# Patient Record
Sex: FEMALE | Race: WHITE | Employment: UNEMPLOYED | URBAN - METROPOLITAN AREA
[De-identification: names, ages, dates, MRNs, and addresses within clinical notes are randomized per-mention and may not be internally consistent; named-entity substitution may affect disease eponyms.]

---

## 2017-02-15 ENCOUNTER — OFFICE VISIT (OUTPATIENT)
Dept: FAMILY MEDICINE CLINIC | Age: 32
End: 2017-02-15

## 2017-02-15 VITALS
OXYGEN SATURATION: 97 % | BODY MASS INDEX: 26.13 KG/M2 | WEIGHT: 142 LBS | SYSTOLIC BLOOD PRESSURE: 120 MMHG | HEIGHT: 62 IN | HEART RATE: 72 BPM | RESPIRATION RATE: 12 BRPM | DIASTOLIC BLOOD PRESSURE: 76 MMHG

## 2017-02-15 DIAGNOSIS — R21 RASH: ICD-10-CM

## 2017-02-15 DIAGNOSIS — E03.1 CONGENITAL HYPOTHYROIDISM WITHOUT GOITER: ICD-10-CM

## 2017-02-15 DIAGNOSIS — F72 MR (MENTAL RETARDATION), SEVERE: Primary | ICD-10-CM

## 2017-02-15 DIAGNOSIS — Z79.899 ENCOUNTER FOR LONG-TERM (CURRENT) USE OF HIGH-RISK MEDICATION: ICD-10-CM

## 2017-02-15 DIAGNOSIS — F31.78 BIPOLAR DISORDER, IN FULL REMISSION, MOST RECENT EPISODE MIXED (HCC): ICD-10-CM

## 2017-02-15 DIAGNOSIS — Z11.1 SCREENING FOR TUBERCULOSIS: ICD-10-CM

## 2017-02-15 RX ORDER — PRENATAL VIT 91/IRON/FOLIC/DHA 28-975-200
COMBINATION PACKAGE (EA) ORAL
Qty: 30 G | Refills: 2 | Status: SHIPPED | OUTPATIENT
Start: 2017-02-15 | End: 2017-12-24

## 2017-02-15 NOTE — LETTER
2/20/2017 11:48 AM 
 
Ms. Faye Avelar [address] [place] [address] [place] [address] [place] [address] Dear Faye Avelar: Please find your most recent results below. Resulted Orders CBC WITH AUTOMATED DIFF Result Value Ref Range WBC 7.9 3.4 - 10.8 x10E3/uL  
 RBC 3.77 3.77 - 5.28 x10E6/uL HGB 10.7 (L) 11.1 - 15.9 g/dL HCT 33.4 (L) 34.0 - 46.6 % MCV 89 79 - 97 fL  
 MCH 28.4 26.6 - 33.0 pg  
 MCHC 32.0 31.5 - 35.7 g/dL  
 RDW 13.2 12.3 - 15.4 % PLATELET 665 116 - 949 x10E3/uL NEUTROPHILS 57 % Lymphocytes 35 % MONOCYTES 6 % EOSINOPHILS 2 % BASOPHILS 0 %  
 ABS. NEUTROPHILS 4.4 1.4 - 7.0 x10E3/uL Abs Lymphocytes 2.8 0.7 - 3.1 x10E3/uL  
 ABS. MONOCYTES 0.5 0.1 - 0.9 x10E3/uL  
 ABS. EOSINOPHILS 0.2 0.0 - 0.4 x10E3/uL  
 ABS. BASOPHILS 0.0 0.0 - 0.2 x10E3/uL IMMATURE GRANULOCYTES 0 %  
 ABS. IMM. GRANS. 0.0 0.0 - 0.1 x10E3/uL Narrative Performed at:  [address]  [id number] : Estela Calle MD, Phone:  [phone] TSH 3RD GENERATION Result Value Ref Range TSH 1.010 0.450 - 4.500 uIU/mL Narrative Performed at:  [address]  [id number] : Estela Calle MD, Phone:  [phone] METABOLIC PANEL, COMPREHENSIVE Result Value Ref Range Glucose 84 65 - 99 mg/dL BUN 29 (H) 6 - 20 mg/dL Creatinine 2.01 (H) 0.57 - 1.00 mg/dL GFR est non-AA 32 (L) >59 mL/min/1.73 GFR est AA 37 (L) >59 mL/min/1.73  
 BUN/Creatinine ratio 14 8 - 20 Sodium 140 134 - 144 mmol/L Potassium 4.3 3.5 - 5.2 mmol/L Chloride 107 (H) 96 - 106 mmol/L  
 CO2 19 18 - 29 mmol/L Calcium 9.3 8.7 - 10.2 mg/dL Protein, total 6.4 6.0 - 8.5 g/dL Albumin 3.6 3.5 - 5.5 g/dL GLOBULIN, TOTAL 2.8 1.5 - 4.5 g/dL A-G Ratio 1.3 1.1 - 2.5 Comment: **Effective March 13, 2017 the reference interval** 
  for A/G Ratio will be changing to: Age                Male          Female 0 -  7 days       1.1 - 2.3       1.1 - 2.3 
          8 - 30 days       1.2 - 2.8       1.2 - 2.8 
          1 -  6 months     1.3 - 3.6       1.3 - 3.6 
   7 months -  5 years      1.5 - 2.6       1.5 - 2.6 
             > 5 years      1.2 - 2.2       1.2 - 2.2 Bilirubin, total <0.2 0.0 - 1.2 mg/dL Alk. phosphatase 39 39 - 117 IU/L  
 AST (SGOT) 9 0 - 40 IU/L  
 ALT (SGPT) 6 0 - 32 IU/L Narrative Performed at:  [address]  [id number] : Obi Vieira MD, Phone:  [phone] LIPID PANEL Result Value Ref Range Cholesterol, total 129 100 - 199 mg/dL Triglyceride 176 (H) 0 - 149 mg/dL HDL Cholesterol 54 >39 mg/dL VLDL, calculated 35 5 - 40 mg/dL LDL, calculated 40 0 - 99 mg/dL Narrative Performed at:  [address]  [id number] : Obi Vieira MD, Phone:  [phone] LITHIUM Result Value Ref Range Lithium 1.5 (H) 0.6 - 1.4 mmol/L Comment:  
                                   Detection Limit = 0.1 
                         <0.1 indicates None Detected Patient drug level exceeds published reference range. Evaluate 
clinically for signs of potential toxicity. Narrative Performed at:  [address]  [id number] : Obi Vieira MD, Phone:  [phone] VALPROIC ACID Result Value Ref Range Valproic acid 46 (L) 50 - 100 ug/mL Comment:  
                                   Detection Limit = 4 
                           <4 indicates None Detected Toxicity may occur at levels of 100-500. Measurements 
of free unbound valproic acid may improve the assess- 
ment of clinical response. Narrative Performed at:  [address]  [id number] : Rosanna Swartz MD, Phone:  [phone] QUANTIFERON TB GOLD Result Value Ref Range QuantiFERON Incubation Incubated, specimen forwarded to [place], [place] for 
completion of the assay. Narrative Performed at:  [address]  [id number] : Rosanna Swartz MD, Phone:  [phone] CVD REPORT Result Value Ref Range INTERPRETATION NTAP   
 PDF IMAGE Not applicable Narrative Performed at:  [address] [place] [address]  [id number] : Shannon Forrester PhD, Phone:  [phone] CKD REPORT Result Value Ref Range Interpretation Note Comment:  
   Supplement report is available. Narrative Performed at:  [address] [place] [address]  [id number] : Shannon Forrester PhD, Phone:  [phone] QUANTIFERON IN TUBE REFL Result Value Ref Range QuantiFERON TB Gold Negative Negative QUANTIFERON CRITERIA Comment Comment: To be considered positive a specimen should have a TB Ag minus Nil 
value greater than or equal to 0.35 IU/mL and in addition the TB Ag 
minus Nil value must be greater than or equal to 25% of the Nil 
value. There may be insufficient information in these values to 
differentiate between some negative and some indeterminate test 
values. QuantiFERON TB Ag Value 0.04 IU/mL QuantiFERON Nil Value 0.05 IU/mL QuantiFERON Mitogen Value >10.00 IU/mL  
 QFT TB Ag minus Nil Value <0.00 IU/mL Interpretation: Comment Comment:  
   The QuantiFERON TB Gold (in Tube) assay is intended for use as an aid 
in the diagnosis of TB infection. Negative results suggest that there 
is no TB infection. In patients with high suspicion of exposure, a 
negative test should be repeated. A positive test indicates infection 
with Mycobacterium tuberculosis. Among individuals without tuberculosis infection, a positive test may be due to exposure to 
[place], M. szmiguel angelgai or M. marinum. On the Internet, go to 
cdc.gov/tb for further details. Narrative Performed at:  [address]  [id number] : Jorge bAdi MD, Phone:  [phone] RECOMMENDATIONS: 
1.  New onset of anemia, recheck 1 month. 2.  Significantly worsening kidney function, please have patient push fluids and we will also recheck in 1 month.  If no improvement will need to see renal specialist.  
3.  Lithium level slightly high, depakote level low.   
4.  Thyroid and cholesterol normal 
TB test negative Please call me if you have any questions: [phone] Sincerely, Kathleen Willams NP

## 2017-02-15 NOTE — PATIENT INSTRUCTIONS

## 2017-02-15 NOTE — MR AVS SNAPSHOT
Visit Information Date & Time Provider Department Dept. Phone Encounter #  
 2/15/2017  8:30 AM Lisandro Vernon NP [facility] [phone] [id number] Follow-up Instructions Return if symptoms worsen or fail to improve. Follow-up and Disposition History Upcoming Health Maintenance Date Due  
 PAP AKA CERVICAL CYTOLOGY 5/27/2018 DTaP/Tdap/Td series (2 - Td) 2/4/2025 Allergies as of 2/15/2017  Review Complete On: 2/15/2017 By: Lisandro Vernon NP Severity Noted Reaction Type Reactions Phenobarbital  08/24/2011    Unknown (comments) Unable to obtain Current Immunizations  Reviewed on 2/9/2016 Name Date Influenza Vaccine (Quad) PF 11/13/2014 TB Skin Test (PPD) Intradermal 2/9/2016, 2/4/2014, 2/5/2013 Tdap 2/4/2015 Not reviewed this visit You Were Diagnosed With   
  
 Codes Comments MR (mental retardation), severe    -  Primary ICD-10-CM: F72 
ICD-9-CM: 318.1 Bipolar disorder, in full remission, most recent episode [facility])     ICD-10-CM: F31.78 
ICD-9-CM: 296.66 Rash     ICD-10-CM: R21 
ICD-9-CM: 782.1 Congenital hypothyroidism without goiter     ICD-10-CM: E03.1 ICD-9-CM: 161 Encounter for long-term (current) use of high-risk medication     ICD-10-CM: Z79.899 ICD-9-CM: V58.69 Screening for tuberculosis     ICD-10-CM: Z11.1 ICD-9-CM: V74.1 Vitals BP Pulse Resp Height(growth percentile) Weight(growth percentile) LMP  
 120/76 72 12 5' 2\" (1.575 m) 142 lb (64.4 kg) 01/13/2017 (Approximate) SpO2 BMI OB Status Smoking Status 97% 25.97 kg/m2 Having regular periods Never Smoker Vitals History BMI and BSA Data Body Mass Index Body Surface Area  
 25.97 kg/m 2 1.68 m 2 Preferred Pharmacy Pharmacy Name Phone [address] [place] [address] [phone] Your Updated Medication List  
  
   
 This list is accurate as of: 2/15/17  1:44 PM.  Always use your most recent med list.  
  
  
  
  
 benztropine 1 mg tablet Commonly known as:  COGENTIN Take 1 Tab by mouth two (2) times a day. cetirizine 10 mg tablet Commonly known as:  ZYRTEC Take 1 Tab by mouth daily. diazePAM 2 mg tablet Commonly known as:  VALIUM Take 1 tablet by mouth as needed for Anxiety. Take 1 hr prior to procedure, may repeat 15 min before procedure as well * docusate 50 mg/5 mL liquid Commonly known as:  Vermell Nones Take 50 mg by mouth daily. * DIOCTO 50 mg/5 mL liquid Generic drug:  docusate GIVE ONE TEASPOONFULL (5 ML = 50 MG) BY MOUTH ONCE DAILY  
  
 divalproex  mg ER tablet Commonly known as:  DEPAKOTE ER Take 2 tablets by mouth daily. * GIANVI (28) PO Take  by mouth. * drospirenone-ethinyl estradiol 3-0.03 mg Tab Commonly known as:  LINDSEY (28) TAKE 1 TABLET BY MOUTH ONCE DAILY AT 5PM  
  
 hydrocortisone 1 % lotion Commonly known as:  ALA-LISSETTE Apply  to affected area two (2) times a day. use thin layer  
  
 hydrOXYzine pamoate 25 mg capsule Commonly known as:  VISTARIL Take 25 mg by mouth three (3) times daily as needed for Itching. ibuprofen 600 mg tablet Commonly known as:  MOTRIN Take  by mouth every six (6) hours as needed for Pain.  
  
 levothyroxine 75 mcg tablet Commonly known as:  SYNTHROID Take 1 po qam.  DUE FOR F/U labs  
  
 lithium carbonate  mg CR tablet Commonly known as:  ESKALITH CR Take 1 Tab by mouth two (2) times a day. mometasone 50 mcg/actuation nasal spray Commonly known as:  NASONEX  
2 sprays by Both Nostrils route daily. mupirocin 2 % ointment Commonly known as:  [facility] [facility] Apply  to affected area daily. naproxen 500 mg tablet Commonly known as:  NAPROSYN Take 1 Tab by mouth two (2) times daily as needed. (with meals)  
  
 promethazine 25 mg tablet Commonly known as:  PHENERGAN Take 1 tablet by mouth every six (6) hours as needed for Nausea. RESTORIL 15 mg capsule Generic drug:  temazepam  
Take 15 mg by mouth nightly. * RisperDAL 3 mg tablet Generic drug:  risperiDONE Take 3 mg by mouth two (2) times a day. * risperiDONE 4 mg tablet Commonly known as:  RisperDAL Take 1 Tab by mouth daily. terbinafine HCl 1 % topical cream  
Commonly known as:  LAMISIL Apply  to affected area two (2) times daily as needed. TOPAMAX 200 mg tablet Generic drug:  topiramate TAKE 1 TABLET BY MOUTH ONCE DAILY AT 5PM  
  
 traZODone 100 mg tablet Commonly known as:  Aaron Fray Take 100 mg by mouth nightly. zolpidem 5 mg tablet Commonly known as:  AMBIEN Take  by mouth nightly as needed for Sleep. * Notice: This list has 6 medication(s) that are the same as other medications prescribed for you. Read the directions carefully, and ask your doctor or other care provider to review them with you. Prescriptions Sent to Pharmacy Refills  
 terbinafine HCl (LAMISIL) 1 % topical cream 2 Sig: Apply  to affected area two (2) times daily as needed. Class: Normal  
 Pharmacy: [address] [place] [address] #: [phone] Route: Topical  
  
We Performed the Following CBC WITH AUTOMATED DIFF [39802 CPT(R)] LIPID PANEL [71444 CPT(R)] LITHIUM C8094571 CPT(R)] METABOLIC PANEL, COMPREHENSIVE [64641 CPT(R)] QUANTIFERON TB GOLD [[id number] Custom] TSH 3RD GENERATION [65840 CPT(R)] VALPROIC ACID [16473 CPT(R)] Follow-up Instructions Return if symptoms worsen or fail to improve. Patient Instructions Well Visit, Ages 25 to 48: Care Instructions Your Care Instructions Physical exams can help you stay healthy. Your doctor has checked your overall health and may have suggested ways to take good care of yourself. He or she also may have recommended tests. At home, you can help prevent illness with healthy eating, regular exercise, and other steps. Follow-up care is a key part of your treatment and safety. Be sure to make and go to all appointments, and call your doctor if you are having problems. It's also a good idea to know your test results and keep a list of the medicines you take. How can you care for yourself at home? · Reach and stay at a healthy weight. This will lower your risk for many problems, such as obesity, diabetes, heart disease, and high blood pressure. · Get at least 30 minutes of physical activity on most days of the week. Walking is a good choice. You also may want to do other activities, such as running, swimming, cycling, or playing tennis or team sports. Discuss any changes in your exercise program with your doctor. · Do not smoke or allow others to smoke around you. If you need help quitting, talk to your doctor about stop-smoking programs and medicines. These can increase your chances of quitting for good. · Talk to your doctor about whether you have any risk factors for sexually transmitted infections (STIs). Having one sex partner (who does not have STIs and does not have sex with anyone else) is a good way to avoid these infections. · Use birth control if you do not want to have children at this time. Talk with your doctor about the choices available and what might be best for you. · Protect your skin from too much sun. When you're outdoors from 10 a.m. to 4 p.m., stay in the shade or cover up with clothing and a hat with a wide brim. Wear sunglasses that block UV rays. Even when it's cloudy, put broad-spectrum sunscreen (SPF 30 or higher) on any exposed skin. · See a dentist one or two times a year for checkups and to have your teeth cleaned. · Wear a seat belt in the car. · Drink alcohol in moderation, if at all.  That means no more than 2 drinks a day for men and 1 drink a day for women. Follow your doctor's advice about when to have certain tests. These tests can spot problems early. For everyone · Cholesterol. Have the fat (cholesterol) in your blood tested after age 21. Your doctor will tell you how often to have this done based on your age, family history, or other things that can increase your risk for heart disease. · Blood pressure. Have your blood pressure checked during a routine doctor visit. Your doctor will tell you how often to check your blood pressure based on your age, your blood pressure results, and other factors. · Vision. Talk with your doctor about how often to have a glaucoma test. 
· Diabetes. Ask your doctor whether you should have tests for diabetes. · Colon cancer. Have a test for colon cancer at age 48. You may have one of several tests. If you are younger than 48, you may need a test earlier if you have any risk factors. Risk factors include whether you already had a precancerous polyp removed from your colon or whether your parent, brother, sister, or child has had colon cancer. For women · Breast exam and mammogram. Talk to your doctor about when you should have a clinical breast exam and a mammogram. Medical experts differ on whether and how often women under 50 should have these tests. Your doctor can help you decide what is right for you. · Pap test and pelvic exam. Begin Pap tests at age 24. A Pap test is the best way to find cervical cancer. The test often is part of a pelvic exam. Ask how often to have this test. 
· Tests for sexually transmitted infections (STIs). Ask whether you should have tests for STIs. You may be at risk if you have sex with more than one person, especially if your partners do not wear condoms. For men · Tests for sexually transmitted infections (STIs). Ask whether you should have tests for STIs.  You may be at risk if you have sex with more than one person, especially if you do not wear a condom. · Testicular cancer exam. Ask your doctor whether you should check your testicles regularly. · Prostate exam. Talk to your doctor about whether you should have a blood test (called a PSA test) for prostate cancer. Experts differ on whether and when men should have this test. Some experts suggest it if you are older than 39 and are -American or have a father or brother who got prostate cancer when he was younger than 72. When should you call for help? Watch closely for changes in your health, and be sure to contact your doctor if you have any problems or symptoms that concern you. Where can you learn more? Go to http://sherrell-savage.info/. Enter P072 in the search box to learn more about \"Well Visit, Ages 25 to 48: Care Instructions. \" Current as of: July 19, 2016 Content Version: 11.1 © 2646-4813 Healthwise, Incorporated. Care instructions adapted under license by [employer] (which disclaims liability or warranty for this information). If you have questions about a medical condition or this instruction, always ask your healthcare professional. [address] any warranty or liability for your use of this information. Introducing [facility]! Dear Calixto Baird: Thank you for requesting a [facility] account. Our records indicate that you have previously registered for a [facility] account but its currently inactive. Please call our [facility] support line at [phone]. Additional Information If you have questions, please visit the Frequently Asked Questions section of the [facility] website at [email or web address] Remember, [facility] is NOT to be used for urgent needs. For medical emergencies, dial 911. Now available from your iPhone and Android! Please provide this summary of care documentation to your next provider. Your primary care clinician is listed as Cayla Woo. If you have any questions after today's visit, please call [phone].

## 2017-02-15 NOTE — PROGRESS NOTES
Anne Holder is a 33 y.o. female presenting for their annual checkup. Follows a low fat diet?  no.  Dietary recall:  Eats 3 meals, fruits and vegetables, drinks mostly water. Follow an exercise program?  no  Hours of sleep? 8-10hrs   Changes in bowel or bladder habits?  no  Up to date on Tdap (<10 years)? Yes   Feels stable and well emotionally? Yes     Current concerns include: Pt needs PPD test and is fasting this morning     Past Medical History   Diagnosis Date    Bipolar affective ([facility] [address] 11/8/2010      History reviewed. No pertinent past surgical history. Prior to Admission medications    Medication Sig Start Date End Date Taking? Authorizing Provider   ETHINYL ESTRADIOL/DROSPIRENONE (GIANVI, 28, PO) Take  by mouth. Yes Historical Provider   terbinafine HCl (LAMISIL) 1 % topical cream Apply  to affected area two (2) times daily as needed. 2/15/17  Yes Araceli Blizzard, NP   levothyroxine (SYNTHROID) 75 mcg tablet Take 1 po qam.  DUE FOR F/U labs 1/23/17  Yes Araceli Blizzard, NP   cetirizine (ZYRTEC) 10 mg tablet Take 1 Tab by mouth daily. 11/23/16  Yes Araceli Blizzard, NP   docusate (COLACE) 50 mg/5 mL liquid Take 50 mg by mouth daily. Yes Historical Provider   naproxen (NAPROSYN) 500 mg tablet Take 1 Tab by mouth two (2) times daily as needed. (with meals) 6/23/15  Yes Kinza Epstein MD   ibuprofen (MOTRIN) 600 mg tablet Take  by mouth every six (6) hours as needed for Pain. Yes Historical Provider   hydrOXYzine (VISTARIL) 25 mg capsule Take 25 mg by mouth three (3) times daily as needed for Itching. Yes Historical Provider   zolpidem (AMBIEN) 5 mg tablet Take  by mouth nightly as needed for Sleep. Yes Historical Provider   traZODone (DESYREL) 100 mg tablet Take 100 mg by mouth nightly. Yes Historical Provider   benztropine (COGENTIN) 1 mg tablet Take 1 Tab by mouth two (2) times a day.  3/27/15  Yes Araceli Blizzard, NP   lithium carbonate CR (ESKALITH CR) 450 mg CR tablet Take 1 Tab by mouth two (2) times a day. 3/27/15  Yes Syeda Mckeon NP   diazepam (VALIUM) 2 mg tablet Take 1 tablet by mouth as needed for Anxiety. Take 1 hr prior to procedure, may repeat 15 min before procedure as well 2/4/15  Yes Syeda Mckeon NP   divalproex ER (DEPAKOTE ER) 500 mg ER tablet Take 2 tablets by mouth daily. 2/2/15  Yes Meredith Sandoval MD   mometasone (NASONEX) 50 mcg/actuation nasal spray 2 sprays by Both Nostrils route daily. 11/12/14  Yes Syeda Mckeon NP   promethazine (PHENERGAN) 25 mg tablet Take 1 tablet by mouth every six (6) hours as needed for Nausea. 10/9/14  Yes Syeda Mckeon NP   TOPAMAX 200 mg tablet TAKE 1 TABLET BY MOUTH ONCE DAILY AT 5PM 3/22/13  Yes Meredith Sandoval MD   risperiDONE (RISPERDAL) 4 mg tablet Take 1 Tab by mouth daily. 12/13/11  Yes Meredith Sandoval MD   drospirenone-ethinyl estradiol (LINDSEY, 28,) 3-0.03 mg per tablet TAKE 1 TABLET BY MOUTH ONCE DAILY AT 5PM 8/12/15   Syeda Mckeon NP   DIOCTO 50 mg/5 mL liquid GIVE ONE TEASPOONFULL (5 ML = 50 MG) BY MOUTH ONCE DAILY 5/4/15   Meredith Sandoval MD   risperiDONE (RISPERDAL) 3 mg tablet Take 3 mg by mouth two (2) times a day. Historical Provider   hydrocortisone (ALA-LISSETTE) 1 % lotion Apply  to affected area two (2) times a day. use thin layer 4/11/12   Montse Alaniz MD   mupirocin (BACTROBAN) 2 % ointment Apply  to affected area daily. 1/3/12   Rosalee Chowdary MD   temazepam (RESTORIL) 15 mg capsule Take 15 mg by mouth nightly. 5/31/11   Historical Provider     Allergies   Allergen Reactions    Phenobarbital Unknown (comments)     Unable to obtain      Social History   Substance Use Topics    Smoking status: Never Smoker    Smokeless tobacco: Never Used    Alcohol use No      History reviewed. No pertinent family history.      Review of Systems -   Psychological ROS: negative  Ophthalmic ROS: negative  ENT ROS: negative  Endocrine ROS: negative  Breast ROS: negative  Respiratory ROS: no cough, shortness of breath, or wheezing  Cardiovascular ROS: no chest pain or dyspnea on exertion  Gastrointestinal ROS: no abdominal pain, change in bowel habits, or black or bloody stools  Genito-Urinary ROS: no dysuria, trouble voiding, or hematuria  Musculoskeletal ROS: negative  Neurological ROS: no TIA or stroke symptoms  Dermatological ROS: negative    Objective:  Visit Vitals    /76    Pulse 72    Resp 12    Ht 5' 2\" (1.575 m)    Wt 142 lb (64.4 kg)    LMP 01/13/2017 (Approximate)    SpO2 97%    BMI 25.97 kg/m2     The physical exam is generally normal. Patient appears well, alert and oriented x 3, pleasant, cooperative. Vitals are as noted. Neck supple and free of adenopathy, or masses. No thyromegaly. CARLYLE. Ears, throat are normal. Lungs are clear to auscultation. Heart sounds are normal, no murmurs, clicks, gallops or rubs. Abdomen is soft, no tenderness, masses or organomegaly. Breasts: patient declines to have breast exam. Self exam is encouraged. Pelvis: examination not indicated. Extremities are normal. Peripheral pulses are normal. Screening neurological exam is normal without focal findings. Skin is normal without suspicious lesions noted. Assessment/Plan:  Eileen was seen today for complete physical.    Diagnoses and all orders for this visit:    MR (mental retardation), severe  -     CBC WITH AUTOMATED DIFF  Stable    Bipolar disorder, in full remission, most recent episode mixed (HCC)  Stable, cont current meds    Rash  -     terbinafine HCl (LAMISIL) 1 % topical cream; Apply  to affected area two (2) times daily as needed. For as needed use, refill provided. Congenital hypothyroidism without goiter  -     TSH 3RD GENERATION    Encounter for long-term (current) use of high-risk medication  -     METABOLIC PANEL, COMPREHENSIVE  -     LIPID PANEL  -     LITHIUM  -     VALPROIC ACID    Screening for tuberculosis  -     QUANTIFERON TB GOLD    Will decide on F/U after reviewing labs.      Discussed importance of healthy diet and regular exercise, recommended multivitamin and sunscreen usage. Encouraged monthly self breast/testicular exam.      Follow-up Disposition:  Return if symptoms worsen or fail to improve.     Rachel Alexander NP

## 2017-02-16 LAB
ALBUMIN SERPL-MCNC: 3.6 G/DL (ref 3.5–5.5)
ALBUMIN/GLOB SERPL: 1.3 {RATIO} (ref 1.1–2.5)
ALP SERPL-CCNC: 39 IU/L (ref 39–117)
ALT SERPL-CCNC: 6 IU/L (ref 0–32)
AST SERPL-CCNC: 9 IU/L (ref 0–40)
BASOPHILS # BLD AUTO: 0 X10E3/UL (ref 0–0.2)
BASOPHILS NFR BLD AUTO: 0 %
BILIRUB SERPL-MCNC: <0.2 MG/DL (ref 0–1.2)
BUN SERPL-MCNC: 29 MG/DL (ref 6–20)
BUN/CREAT SERPL: 14 (ref 8–20)
CALCIUM SERPL-MCNC: 9.3 MG/DL (ref 8.7–10.2)
CHLORIDE SERPL-SCNC: 107 MMOL/L (ref 96–106)
CHOLEST SERPL-MCNC: 129 MG/DL (ref 100–199)
CO2 SERPL-SCNC: 19 MMOL/L (ref 18–29)
CREAT SERPL-MCNC: 2.01 MG/DL (ref 0.57–1)
EOSINOPHIL # BLD AUTO: 0.2 X10E3/UL (ref 0–0.4)
EOSINOPHIL NFR BLD AUTO: 2 %
ERYTHROCYTE [DISTWIDTH] IN BLOOD BY AUTOMATED COUNT: 13.2 % (ref 12.3–15.4)
GLOBULIN SER CALC-MCNC: 2.8 G/DL (ref 1.5–4.5)
GLUCOSE SERPL-MCNC: 84 MG/DL (ref 65–99)
HCT VFR BLD AUTO: 33.4 % (ref 34–46.6)
HDLC SERPL-MCNC: 54 MG/DL
HGB BLD-MCNC: 10.7 G/DL (ref 11.1–15.9)
IMM GRANULOCYTES # BLD: 0 X10E3/UL (ref 0–0.1)
IMM GRANULOCYTES NFR BLD: 0 %
INTERPRETATION, 910389: NORMAL
INTERPRETATION: NORMAL
LDLC SERPL CALC-MCNC: 40 MG/DL (ref 0–99)
LITHIUM SERPL-SCNC: 1.5 MMOL/L (ref 0.6–1.4)
LYMPHOCYTES # BLD AUTO: 2.8 X10E3/UL (ref 0.7–3.1)
LYMPHOCYTES NFR BLD AUTO: 35 %
MCH RBC QN AUTO: 28.4 PG (ref 26.6–33)
MCHC RBC AUTO-ENTMCNC: 32 G/DL (ref 31.5–35.7)
MCV RBC AUTO: 89 FL (ref 79–97)
MONOCYTES # BLD AUTO: 0.5 X10E3/UL (ref 0.1–0.9)
MONOCYTES NFR BLD AUTO: 6 %
NEUTROPHILS # BLD AUTO: 4.4 X10E3/UL (ref 1.4–7)
NEUTROPHILS NFR BLD AUTO: 57 %
PDF IMAGE, 910387: NORMAL
PLATELET # BLD AUTO: 211 X10E3/UL (ref 150–379)
POTASSIUM SERPL-SCNC: 4.3 MMOL/L (ref 3.5–5.2)
PROT SERPL-MCNC: 6.4 G/DL (ref 6–8.5)
RBC # BLD AUTO: 3.77 X10E6/UL (ref 3.77–5.28)
SODIUM SERPL-SCNC: 140 MMOL/L (ref 134–144)
TRIGL SERPL-MCNC: 176 MG/DL (ref 0–149)
TSH SERPL DL<=0.005 MIU/L-ACNC: 1.01 UIU/ML (ref 0.45–4.5)
VALPROATE SERPL-MCNC: 46 UG/ML (ref 50–100)
VLDLC SERPL CALC-MCNC: 35 MG/DL (ref 5–40)
WBC # BLD AUTO: 7.9 X10E3/UL (ref 3.4–10.8)

## 2017-02-16 NOTE — PROGRESS NOTES
Please inform caregivers pt labs show:   1. New onset of anemia, recheck 1 mo. 2.  Significantly worsening kidney function, please have pt push fluids and we will also recheck in 1 mo. If no improvement will need to see renal specialist.   3.  Lithium level slightly high, depakote level low.     4.  Thyroid and cholesterol normal.   Thanks,  N

## 2017-02-20 LAB
ANNOTATION COMMENT IMP: NORMAL
GAMMA INTERFERON BACKGROUND BLD IA-ACNC: 0.05 IU/ML
M TB IFN-G BLD-IMP: NEGATIVE
M TB IFN-G CD4+ BCKGRND COR BLD-ACNC: <0 IU/ML
M TB IFN-G CD4+ T-CELLS BLD-ACNC: 0.04 IU/ML
MITOGEN IGNF BLD-ACNC: >10 IU/ML
QUANTIFERON INCUBATION: NORMAL
SERVICE CMNT-IMP: NORMAL

## 2017-06-23 RX ORDER — NAPROXEN 500 MG/1
500 TABLET ORAL
Qty: 30 TAB | Refills: 2 | Status: SHIPPED | OUTPATIENT
Start: 2017-06-23 | End: 2017-12-27

## 2017-06-23 RX ORDER — CETIRIZINE HCL 10 MG
10 TABLET ORAL DAILY
Qty: 30 TAB | Refills: 5 | Status: SHIPPED | OUTPATIENT
Start: 2017-06-23 | End: 2017-09-15 | Stop reason: SDUPTHER

## 2017-06-30 RX ORDER — DROSPIRENONE AND ETHINYL ESTRADIOL 0.02-3(28)
1 KIT ORAL DAILY
Qty: 1 PACKAGE | Refills: 11 | Status: SHIPPED | OUTPATIENT
Start: 2017-06-30 | End: 2017-09-15 | Stop reason: SDUPTHER

## 2017-09-15 ENCOUNTER — DOCUMENTATION ONLY (OUTPATIENT)
Dept: FAMILY MEDICINE CLINIC | Age: 32
End: 2017-09-15

## 2017-09-15 DIAGNOSIS — K59.00 CONSTIPATION, UNSPECIFIED CONSTIPATION TYPE: ICD-10-CM

## 2017-09-15 RX ORDER — CETIRIZINE HCL 10 MG
10 TABLET ORAL DAILY
Qty: 30 TAB | Refills: 5 | Status: SHIPPED | OUTPATIENT
Start: 2017-09-15 | End: 2018-04-17 | Stop reason: SDUPTHER

## 2017-09-15 RX ORDER — LEVOTHYROXINE SODIUM 75 UG/1
TABLET ORAL
Qty: 30 TAB | Refills: 5 | Status: SHIPPED | OUTPATIENT
Start: 2017-09-15 | End: 2018-04-09 | Stop reason: DRUGHIGH

## 2017-09-15 RX ORDER — DROSPIRENONE AND ETHINYL ESTRADIOL 0.02-3(28)
1 KIT ORAL DAILY
Qty: 1 PACKAGE | Refills: 11 | Status: SHIPPED | OUTPATIENT
Start: 2017-09-15 | End: 2018-09-05 | Stop reason: SDUPTHER

## 2017-09-15 RX ORDER — DOCUSATE SODIUM 50 MG/5ML
50 LIQUID ORAL DAILY
Qty: 150 ML | Refills: 5 | Status: SHIPPED | OUTPATIENT
Start: 2017-09-15 | End: 2017-10-15

## 2017-09-15 RX ORDER — MOMETASONE FUROATE 50 UG/1
2 SPRAY, METERED NASAL DAILY
Qty: 1 CONTAINER | Refills: 5 | Status: SHIPPED | OUTPATIENT
Start: 2017-09-15 | End: 2017-12-24

## 2017-12-20 ENCOUNTER — TELEPHONE (OUTPATIENT)
Dept: FAMILY MEDICINE CLINIC | Age: 32
End: 2017-12-20

## 2017-12-20 ENCOUNTER — OFFICE VISIT (OUTPATIENT)
Dept: FAMILY MEDICINE CLINIC | Age: 32
End: 2017-12-20

## 2017-12-20 VITALS
SYSTOLIC BLOOD PRESSURE: 120 MMHG | HEART RATE: 74 BPM | DIASTOLIC BLOOD PRESSURE: 75 MMHG | BODY MASS INDEX: 25.98 KG/M2 | TEMPERATURE: 97.4 F | HEIGHT: 62 IN | RESPIRATION RATE: 15 BRPM | WEIGHT: 141.2 LBS | OXYGEN SATURATION: 98 %

## 2017-12-20 DIAGNOSIS — N39.498 FREQUENT URINARY INCONTINENCE: Primary | ICD-10-CM

## 2017-12-20 DIAGNOSIS — N30.01 ACUTE CYSTITIS WITH HEMATURIA: ICD-10-CM

## 2017-12-20 LAB
BILIRUB UR QL STRIP: NEGATIVE
GLUCOSE UR-MCNC: NEGATIVE MG/DL
KETONES P FAST UR STRIP-MCNC: NEGATIVE MG/DL
PH UR STRIP: 6 [PH] (ref 4.6–8)
PROT UR QL STRIP: NEGATIVE
SP GR UR STRIP: 1.01 (ref 1–1.03)
UA UROBILINOGEN AMB POC: NORMAL (ref 0.2–1)
URINALYSIS CLARITY POC: NORMAL
URINALYSIS COLOR POC: YELLOW
URINE BLOOD POC: NORMAL
URINE LEUKOCYTES POC: NORMAL
URINE NITRITES POC: NEGATIVE

## 2017-12-20 RX ORDER — NITROFURANTOIN 25; 75 MG/1; MG/1
100 CAPSULE ORAL 2 TIMES DAILY
Qty: 14 CAP | Refills: 0 | Status: SHIPPED | OUTPATIENT
Start: 2017-12-20 | End: 2017-12-27

## 2017-12-20 NOTE — PROGRESS NOTES
Subjective:     Edis Major is a 33 y.o. female who complains of dysuria, frequency for 2 weeks. Patient denies flank pain, vomiting, fever, unusual vaginal discharge. Patient does not have a history of recurrent UTI. Patient does not have a history of pyelonephritis. 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 Objective:     Visit Vitals    /75 (BP 1 Location: Left arm, BP Patient Position: Sitting)    Pulse 74    Temp 97.4 °F (36.3 °C) (Oral)    Resp 15    Ht 5' 2\" (1.575 m)    Wt 141 lb 3.2 oz (64 kg)    SpO2 98%    BMI 25.83 kg/m2     General:  alert, cooperative, no distress   Abdomen: soft, nontender, nondistended, no masses or organomegaly. Back:  CVA tenderness absent   :  defer exam     Laboratory:   Urine dipstick shows positive for RBC's and positive for leukocytes. Micro exam: not done. Assessment/Plan:     Acute cystitis     1. nitrofurantoin  2. Maintain adequate hydration  3. May use OTC pyridium as desired, which will turn urine orange/red color  4. Follow up if symptoms not improving, and prn. ICD-10-CM ICD-9-CM    1. Frequent urinary incontinence R39.81 788.33 AMB POC URINALYSIS DIP STICK MANUAL W/O MICRO      CULTURE, URINE   2. Acute cystitis with hematuria N30.01 595.0 CULTURE, URINE     Encounter Diagnoses   Name Primary?     Frequent urinary incontinence Yes    Acute cystitis with hematuria      Orders Placed This Encounter    CULTURE, URINE    AMB POC URINALYSIS DIP STICK MANUAL W/O MICRO    nitrofurantoin, macrocrystal-monohydrate, (MACROBID) 100 mg capsule   .

## 2017-12-20 NOTE — PROGRESS NOTES
Chief Complaint   Patient presents with    Urinary Frequency    Enuresis    Fatigue     Patient seen in the office today with group home staff for \"possible UTI\"  Caregiver reports excessive thirst, urination and fatigue x's 2 weeks. Patient is \"usually\" continent of [place]. Denies pain on urination, or odor. Reported concentrated urine. \"At times, it looks like dark yellow urine\"  Caregiver educated on clean catch urine sample. Urine obtained. Urine cloudy, light yellow,with no odor present at this time. POC performed.

## 2017-12-20 NOTE — MR AVS SNAPSHOT
Visit Information Date & Time Provider Department Dept. Phone Encounter #  
 12/20/2017  2:00 PM Annette Alaniz MD [address] [facility] [phone] [id number] Upcoming Health Maintenance Date Due  
 PAP AKA CERVICAL CYTOLOGY 5/27/2018 DTaP/Tdap/Td series (2 - Td) 2/4/2025 Allergies as of 12/20/2017  Review Complete On: 12/20/2017 By: Yinka Reed MD  
  
 Severity Noted Reaction Type Reactions Phenobarbital  08/24/2011    Unknown (comments) Unable to obtain Current Immunizations  Reviewed on 2/9/2016 Name Date Influenza Vaccine (Quad) PF 11/13/2014 TB Skin Test (PPD) Intradermal 2/9/2016, 2/4/2014, 2/5/2013 Tdap 2/4/2015 Not reviewed this visit You Were Diagnosed With   
  
 Codes Comments Frequent urinary incontinence    -  Primary ICD-10-CM: R39.81 ICD-9-CM: 788.33 Acute cystitis with hematuria     ICD-10-CM: N30.01 
ICD-9-CM: 595.0 Vitals BP Pulse Temp Resp Height(growth percentile) Weight(growth percentile) 120/75 (BP 1 Location: Left arm, BP Patient Position: Sitting) 74 97.4 °F (36.3 °C) (Oral) 15 5' 2\" (1.575 m) 141 lb 3.2 oz (64 kg) SpO2 BMI OB Status Smoking Status 98% 25.83 kg/m2 Having regular periods Never Smoker BMI and BSA Data Body Mass Index Body Surface Area  
 25.83 kg/m 2 1.67 m 2 Preferred Pharmacy Pharmacy Name Phone [facility], [address] [phone] Your Updated Medication List  
  
   
This list is accurate as of: 12/20/17  2:25 PM.  Always use your most recent med list.  
  
  
  
  
 benztropine 1 mg tablet Commonly known as:  COGENTIN Take 1 Tab by mouth two (2) times a day. cetirizine 10 mg tablet Commonly known as:  ZYRTEC Take 1 Tab by mouth daily. diazePAM 2 mg tablet Commonly known as:  VALIUM Take 1 tablet by mouth as needed for Anxiety.  Take 1 hr prior to procedure, may repeat 15 min before procedure as well  
  
 divalproex  mg ER tablet Commonly known as:  DEPAKOTE ER Take 2 tablets by mouth daily. drospirenone-ethinyl estradiol 3-0.02 mg Tab Commonly known as:  ANAYA Take 1 Tab by mouth daily. hydrocortisone 1 % lotion Commonly known as:  ALA-LISSETTE Apply  to affected area two (2) times a day. use thin layer  
  
 hydrOXYzine pamoate 25 mg capsule Commonly known as:  VISTARIL Take 25 mg by mouth three (3) times daily as needed for Itching. ibuprofen 600 mg tablet Commonly known as:  MOTRIN Take  by mouth every six (6) hours as needed for Pain.  
  
 levothyroxine 75 mcg tablet Commonly known as:  SYNTHROID Take 1 po qam.  
  
 lithium carbonate  mg CR tablet Commonly known as:  ESKALITH CR Take 1 Tab by mouth two (2) times a day. mometasone 50 mcg/actuation nasal spray Commonly known as:  NASONEX  
2 Sprays by Both Nostrils route daily. mupirocin 2 % ointment Commonly known as:  [employer] Apply  to affected area daily. naproxen 500 mg tablet Commonly known as:  NAPROSYN Take 1 Tab by mouth two (2) times daily as needed. (with meals)  
  
 nitrofurantoin (macrocrystal-monohydrate) 100 mg capsule Commonly known as:  MACROBID Take 1 Cap by mouth two (2) times a day for 7 days. promethazine 25 mg tablet Commonly known as:  PHENERGAN Take 1 tablet by mouth every six (6) hours as needed for Nausea. RESTORIL 15 mg capsule Generic drug:  temazepam  
Take 15 mg by mouth nightly. * RisperDAL 3 mg tablet Generic drug:  risperiDONE Take 3 mg by mouth two (2) times a day. * risperiDONE 4 mg tablet Commonly known as:  RisperDAL Take 1 Tab by mouth daily. terbinafine HCl 1 % topical cream  
Commonly known as:  LAMISIL Apply  to affected area two (2) times daily as needed. TOPAMAX 200 mg tablet Generic drug:  topiramate TAKE 1 TABLET BY MOUTH ONCE DAILY AT 5PM  
  
 traZODone 100 mg tablet Commonly known as:  Dale Oh Take 100 mg by mouth nightly. zolpidem 5 mg tablet Commonly known as:  AMBIEN Take  by mouth nightly as needed for Sleep. * Notice: This list has 2 medication(s) that are the same as other medications prescribed for you. Read the directions carefully, and ask your doctor or other care provider to review them with you. Prescriptions Sent to Pharmacy Refills  
 nitrofurantoin, macrocrystal-monohydrate, (MACROBID) 100 mg capsule 0 Sig: Take 1 Cap by mouth two (2) times a day for 7 days. Class: Normal  
 Pharmacy: [address] [place] [address] #: [phone] Route: Oral  
  
We Performed the Following AMB POC URINALYSIS DIP STICK MANUAL W/O MICRO [49275 CPT(R)] CULTURE, URINE [id number] CPT(R)] Introducing [facility]! Dear Mariano Isaac: Thank you for requesting a [employer] account. Our records indicate that you have previously registered for a [employer] account but its currently inactive. Please call our [employer] support line at [phone]. Additional Information If you have questions, please visit the Frequently Asked Questions section of the [employer] website at [email or web address] Remember, [employer] is NOT to be used for urgent needs. For medical emergencies, dial 911. Now available from your iPhone and Android! Please provide this summary of care documentation to your next provider. Your primary care clinician is listed as Kathleen Willams. If you have any questions after today's visit, please call [phone].

## 2017-12-22 LAB — BACTERIA UR CULT: NORMAL

## 2017-12-24 ENCOUNTER — HOSPITAL ENCOUNTER (INPATIENT)
Age: 32
LOS: 3 days | Discharge: HOME | DRG: 812 | End: 2017-12-27
Attending: EMERGENCY MEDICINE | Admitting: FAMILY MEDICINE
Payer: MEDICAID

## 2017-12-24 ENCOUNTER — APPOINTMENT (OUTPATIENT)
Dept: ULTRASOUND IMAGING | Age: 32
DRG: 812 | End: 2017-12-24
Attending: FAMILY MEDICINE
Payer: MEDICAID

## 2017-12-24 ENCOUNTER — APPOINTMENT (OUTPATIENT)
Dept: GENERAL RADIOLOGY | Age: 32
DRG: 812 | End: 2017-12-24
Attending: FAMILY MEDICINE
Payer: MEDICAID

## 2017-12-24 DIAGNOSIS — N28.9 ACUTE KIDNEY INSUFFICIENCY: Primary | ICD-10-CM

## 2017-12-24 PROBLEM — N17.9 ACUTE KIDNEY INJURY (HCC): Status: ACTIVE | Noted: 2017-12-24

## 2017-12-24 PROBLEM — R41.82 ALTERED MENTAL STATUS: Status: ACTIVE | Noted: 2017-12-24

## 2017-12-24 PROBLEM — N39.0 UTI (URINARY TRACT INFECTION): Status: ACTIVE | Noted: 2017-12-24

## 2017-12-24 LAB
AMORPH CRY URNS QL MICRO: ABNORMAL
AMPHET UR QL SCN: NEGATIVE
ANION GAP SERPL CALC-SCNC: 10 MMOL/L (ref 5–15)
ANION GAP SERPL CALC-SCNC: 9 MMOL/L (ref 5–15)
APPEARANCE UR: ABNORMAL
BACTERIA URNS QL MICRO: NEGATIVE /HPF
BARBITURATES UR QL SCN: NEGATIVE
BASOPHILS # BLD: 0 K/UL (ref 0–0.1)
BASOPHILS NFR BLD: 0 % (ref 0–1)
BENZODIAZ UR QL: NEGATIVE
BILIRUB UR QL: NEGATIVE
BUN SERPL-MCNC: 35 MG/DL (ref 6–20)
BUN SERPL-MCNC: 35 MG/DL (ref 6–20)
BUN/CREAT SERPL: 11 (ref 12–20)
BUN/CREAT SERPL: 12 (ref 12–20)
CALCIUM SERPL-MCNC: 8.4 MG/DL (ref 8.5–10.1)
CALCIUM SERPL-MCNC: 9.2 MG/DL (ref 8.5–10.1)
CANNABINOIDS UR QL SCN: NEGATIVE
CHLORIDE SERPL-SCNC: 108 MMOL/L (ref 97–108)
CHLORIDE SERPL-SCNC: 111 MMOL/L (ref 97–108)
CK SERPL-CCNC: 21 U/L (ref 26–192)
CO2 SERPL-SCNC: 22 MMOL/L (ref 21–32)
CO2 SERPL-SCNC: 24 MMOL/L (ref 21–32)
COCAINE UR QL SCN: NEGATIVE
COLOR UR: ABNORMAL
CREAT SERPL-MCNC: 2.91 MG/DL (ref 0.55–1.02)
CREAT SERPL-MCNC: 3.25 MG/DL (ref 0.55–1.02)
CREAT UR-MCNC: 29.69 MG/DL
DATE LAST DOSE: ABNORMAL
DRUG SCRN COMMENT,DRGCM: NORMAL
EOSINOPHIL # BLD: 0.4 K/UL (ref 0–0.4)
EOSINOPHIL NFR BLD: 3 % (ref 0–7)
EPITH CASTS URNS QL MICRO: ABNORMAL /LPF
ERYTHROCYTE [DISTWIDTH] IN BLOOD BY AUTOMATED COUNT: 12.7 % (ref 11.5–14.5)
FLUAV AG NPH QL IA: NEGATIVE
FLUBV AG NOSE QL IA: NEGATIVE
GLUCOSE SERPL-MCNC: 101 MG/DL (ref 65–100)
GLUCOSE SERPL-MCNC: 97 MG/DL (ref 65–100)
GLUCOSE UR STRIP.AUTO-MCNC: NEGATIVE MG/DL
HAPTOGLOB SERPL-MCNC: 171 MG/DL (ref 30–200)
HCT VFR BLD AUTO: 32.7 % (ref 35–47)
HGB BLD-MCNC: 10.9 G/DL (ref 11.5–16)
HGB UR QL STRIP: ABNORMAL
IRON SATN MFR SERPL: 13 % (ref 20–50)
IRON SERPL-MCNC: 36 UG/DL (ref 35–150)
KETONES UR QL STRIP.AUTO: NEGATIVE MG/DL
LACTATE SERPL-SCNC: 0.9 MMOL/L (ref 0.4–2)
LACTATE SERPL-SCNC: 1 MMOL/L (ref 0.4–2)
LDH SERPL L TO P-CCNC: 131 U/L (ref 81–246)
LEUKOCYTE ESTERASE UR QL STRIP.AUTO: ABNORMAL
LITHIUM SERPL-SCNC: 2.78 MMOL/L (ref 0.6–1.2)
LYMPHOCYTES # BLD: 0.9 K/UL (ref 0.8–3.5)
LYMPHOCYTES NFR BLD: 6 % (ref 12–49)
MCH RBC QN AUTO: 28.5 PG (ref 26–34)
MCHC RBC AUTO-ENTMCNC: 33.3 G/DL (ref 30–36.5)
MCV RBC AUTO: 85.6 FL (ref 80–99)
METHADONE UR QL: NEGATIVE
MONOCYTES # BLD: 0.9 K/UL (ref 0–1)
MONOCYTES NFR BLD: 6 % (ref 5–13)
NEUTS SEG # BLD: 12.9 K/UL (ref 1.8–8)
NEUTS SEG NFR BLD: 85 % (ref 32–75)
NITRITE UR QL STRIP.AUTO: NEGATIVE
OPIATES UR QL: NEGATIVE
PCP UR QL: NEGATIVE
PH UR STRIP: 6.5 [PH] (ref 5–8)
PLATELET # BLD AUTO: 163 K/UL (ref 150–400)
POTASSIUM SERPL-SCNC: 3.6 MMOL/L (ref 3.5–5.1)
POTASSIUM SERPL-SCNC: 3.6 MMOL/L (ref 3.5–5.1)
PROT UR STRIP-MCNC: 100 MG/DL
RBC # BLD AUTO: 3.82 M/UL (ref 3.8–5.2)
RBC #/AREA URNS HPF: ABNORMAL /HPF (ref 0–5)
REPORTED DOSE,DOSE: ABNORMAL UNITS
REPORTED DOSE/TIME,TMG: ABNORMAL
SODIUM SERPL-SCNC: 142 MMOL/L (ref 136–145)
SODIUM SERPL-SCNC: 142 MMOL/L (ref 136–145)
SP GR UR REFRACTOMETRY: 1.01 (ref 1–1.03)
TIBC SERPL-MCNC: 279 UG/DL (ref 250–450)
UROBILINOGEN UR QL STRIP.AUTO: 0.2 EU/DL (ref 0.2–1)
VALPROATE SERPL-MCNC: 69 UG/ML (ref 50–100)
WBC # BLD AUTO: 15.1 K/UL (ref 3.6–11)
WBC URNS QL MICRO: ABNORMAL /HPF (ref 0–4)

## 2017-12-24 PROCEDURE — 74011250636 HC RX REV CODE- 250/636: Performed by: FAMILY MEDICINE

## 2017-12-24 PROCEDURE — 71010 XR CHEST PORT: CPT

## 2017-12-24 PROCEDURE — 74011250637 HC RX REV CODE- 250/637: Performed by: FAMILY MEDICINE

## 2017-12-24 PROCEDURE — 83010 ASSAY OF HAPTOGLOBIN QUANT: CPT

## 2017-12-24 PROCEDURE — 99284 EMERGENCY DEPT VISIT MOD MDM: CPT

## 2017-12-24 PROCEDURE — 87804 INFLUENZA ASSAY W/OPTIC: CPT

## 2017-12-24 PROCEDURE — 87040 BLOOD CULTURE FOR BACTERIA: CPT

## 2017-12-24 PROCEDURE — 80164 ASSAY DIPROPYLACETIC ACD TOT: CPT | Performed by: NURSE PRACTITIONER

## 2017-12-24 PROCEDURE — 83605 ASSAY OF LACTIC ACID: CPT | Performed by: FAMILY MEDICINE

## 2017-12-24 PROCEDURE — 74011250636 HC RX REV CODE- 250/636: Performed by: NURSE PRACTITIONER

## 2017-12-24 PROCEDURE — 80201 ASSAY OF TOPIRAMATE: CPT | Performed by: NURSE PRACTITIONER

## 2017-12-24 PROCEDURE — 65660000000 HC RM CCU STEPDOWN

## 2017-12-24 PROCEDURE — 84300 ASSAY OF URINE SODIUM: CPT

## 2017-12-24 PROCEDURE — 96360 HYDRATION IV INFUSION INIT: CPT

## 2017-12-24 PROCEDURE — 83605 ASSAY OF LACTIC ACID: CPT | Performed by: NURSE PRACTITIONER

## 2017-12-24 PROCEDURE — 83935 ASSAY OF URINE OSMOLALITY: CPT

## 2017-12-24 PROCEDURE — 83550 IRON BINDING TEST: CPT

## 2017-12-24 PROCEDURE — 74011000258 HC RX REV CODE- 258: Performed by: FAMILY MEDICINE

## 2017-12-24 PROCEDURE — 80165 DIPROPYLACETIC ACID FREE: CPT | Performed by: SPECIALIST

## 2017-12-24 PROCEDURE — 83615 LACTATE (LD) (LDH) ENZYME: CPT

## 2017-12-24 PROCEDURE — 80178 ASSAY OF LITHIUM: CPT

## 2017-12-24 PROCEDURE — 76770 US EXAM ABDO BACK WALL COMP: CPT

## 2017-12-24 PROCEDURE — 80178 ASSAY OF LITHIUM: CPT | Performed by: NURSE PRACTITIONER

## 2017-12-24 PROCEDURE — 80048 BASIC METABOLIC PNL TOTAL CA: CPT | Performed by: FAMILY MEDICINE

## 2017-12-24 PROCEDURE — 82570 ASSAY OF URINE CREATININE: CPT

## 2017-12-24 PROCEDURE — 82728 ASSAY OF FERRITIN: CPT

## 2017-12-24 PROCEDURE — 81001 URINALYSIS AUTO W/SCOPE: CPT | Performed by: NURSE PRACTITIONER

## 2017-12-24 PROCEDURE — 36415 COLL VENOUS BLD VENIPUNCTURE: CPT

## 2017-12-24 PROCEDURE — 87086 URINE CULTURE/COLONY COUNT: CPT | Performed by: FAMILY MEDICINE

## 2017-12-24 PROCEDURE — 80048 BASIC METABOLIC PNL TOTAL CA: CPT | Performed by: NURSE PRACTITIONER

## 2017-12-24 PROCEDURE — 80307 DRUG TEST PRSMV CHEM ANLYZR: CPT

## 2017-12-24 PROCEDURE — 93005 ELECTROCARDIOGRAM TRACING: CPT

## 2017-12-24 PROCEDURE — 85025 COMPLETE CBC W/AUTO DIFF WBC: CPT | Performed by: NURSE PRACTITIONER

## 2017-12-24 PROCEDURE — 82550 ASSAY OF CK (CPK): CPT

## 2017-12-24 RX ORDER — DIVALPROEX SODIUM 500 MG/1
500 TABLET, EXTENDED RELEASE ORAL 2 TIMES DAILY
Status: DISCONTINUED | OUTPATIENT
Start: 2017-12-24 | End: 2017-12-27 | Stop reason: HOSPADM

## 2017-12-24 RX ORDER — SODIUM CHLORIDE 0.9 % (FLUSH) 0.9 %
5-10 SYRINGE (ML) INJECTION AS NEEDED
Status: DISCONTINUED | OUTPATIENT
Start: 2017-12-24 | End: 2017-12-27 | Stop reason: HOSPADM

## 2017-12-24 RX ORDER — LEVOTHYROXINE SODIUM 75 UG/1
75 TABLET ORAL
Status: DISCONTINUED | OUTPATIENT
Start: 2017-12-25 | End: 2017-12-27 | Stop reason: HOSPADM

## 2017-12-24 RX ORDER — DOCUSATE SODIUM 100 MG/1
100 CAPSULE, LIQUID FILLED ORAL
Status: DISCONTINUED | OUTPATIENT
Start: 2017-12-24 | End: 2017-12-25

## 2017-12-24 RX ORDER — TOPIRAMATE 25 MG/1
50 TABLET ORAL
Status: DISCONTINUED | OUTPATIENT
Start: 2017-12-24 | End: 2017-12-27 | Stop reason: HOSPADM

## 2017-12-24 RX ORDER — SODIUM CHLORIDE 0.9 % (FLUSH) 0.9 %
5-10 SYRINGE (ML) INJECTION EVERY 8 HOURS
Status: DISCONTINUED | OUTPATIENT
Start: 2017-12-24 | End: 2017-12-27 | Stop reason: HOSPADM

## 2017-12-24 RX ORDER — HEPARIN SODIUM 5000 [USP'U]/ML
5000 INJECTION, SOLUTION INTRAVENOUS; SUBCUTANEOUS EVERY 8 HOURS
Status: DISCONTINUED | OUTPATIENT
Start: 2017-12-24 | End: 2017-12-27 | Stop reason: HOSPADM

## 2017-12-24 RX ORDER — RISPERIDONE 1 MG/1
2 TABLET, FILM COATED ORAL DAILY
Status: DISCONTINUED | OUTPATIENT
Start: 2017-12-25 | End: 2017-12-27 | Stop reason: HOSPADM

## 2017-12-24 RX ORDER — SODIUM CHLORIDE 9 MG/ML
75 INJECTION, SOLUTION INTRAVENOUS CONTINUOUS
Status: DISCONTINUED | OUTPATIENT
Start: 2017-12-24 | End: 2017-12-24

## 2017-12-24 RX ORDER — FLUTICASONE PROPIONATE 50 MCG
2 SPRAY, SUSPENSION (ML) NASAL DAILY
Status: ON HOLD | COMMUNITY
End: 2022-09-28

## 2017-12-24 RX ORDER — TOPIRAMATE 100 MG/1
200 TABLET, FILM COATED ORAL
Status: DISCONTINUED | OUTPATIENT
Start: 2017-12-24 | End: 2017-12-24

## 2017-12-24 RX ORDER — RISPERIDONE 2 MG/1
2 TABLET, FILM COATED ORAL DAILY
COMMUNITY

## 2017-12-24 RX ORDER — DIVALPROEX SODIUM 500 MG/1
500 TABLET, EXTENDED RELEASE ORAL 3 TIMES DAILY
Status: ON HOLD | COMMUNITY
End: 2022-09-28

## 2017-12-24 RX ORDER — LITHIUM CARBONATE 450 MG/1
450 TABLET ORAL 2 TIMES DAILY
Status: DISCONTINUED | OUTPATIENT
Start: 2017-12-24 | End: 2017-12-24

## 2017-12-24 RX ORDER — TOPIRAMATE 200 MG/1
200 TABLET ORAL
COMMUNITY

## 2017-12-24 RX ORDER — BENZTROPINE MESYLATE 1 MG/1
1 TABLET ORAL 2 TIMES DAILY
Status: DISCONTINUED | OUTPATIENT
Start: 2017-12-24 | End: 2017-12-27 | Stop reason: HOSPADM

## 2017-12-24 RX ADMIN — CEFEPIME HYDROCHLORIDE 1 G: 1 INJECTION, POWDER, FOR SOLUTION INTRAMUSCULAR; INTRAVENOUS at 13:14

## 2017-12-24 RX ADMIN — Medication 10 ML: at 14:00

## 2017-12-24 RX ADMIN — Medication 10 ML: at 22:12

## 2017-12-24 RX ADMIN — HEPARIN SODIUM 5000 UNITS: 5000 INJECTION, SOLUTION INTRAVENOUS; SUBCUTANEOUS at 22:12

## 2017-12-24 RX ADMIN — HEPARIN SODIUM 5000 UNITS: 5000 INJECTION, SOLUTION INTRAVENOUS; SUBCUTANEOUS at 13:17

## 2017-12-24 RX ADMIN — BENZTROPINE 1 MG: 1 TABLET ORAL at 22:12

## 2017-12-24 RX ADMIN — SODIUM CHLORIDE 1000 ML: 900 INJECTION, SOLUTION INTRAVENOUS at 11:01

## 2017-12-24 RX ADMIN — SODIUM CHLORIDE 75 ML/HR: 900 INJECTION, SOLUTION INTRAVENOUS at 15:10

## 2017-12-24 RX ADMIN — SODIUM CHLORIDE 1000 ML: 9 INJECTION, SOLUTION INTRAVENOUS at 12:38

## 2017-12-24 RX ADMIN — VANCOMYCIN HYDROCHLORIDE 1000 MG: 1 INJECTION, POWDER, LYOPHILIZED, FOR SOLUTION INTRAVENOUS at 13:49

## 2017-12-24 RX ADMIN — TOPIRAMATE 50 MG: 25 TABLET, FILM COATED ORAL at 22:11

## 2017-12-24 RX ADMIN — DIVALPROEX SODIUM 500 MG: 500 TABLET, EXTENDED RELEASE ORAL at 22:11

## 2017-12-24 NOTE — PROGRESS NOTES
[facility]   Senior Resident Admission Note    CC: fatigue, lethargy    HPI:  Cristina Almeida is a 33 y.o. female with hx of severe MR, Bipolar, focal seizure, hypothyroidism, who presents to the ER with fatigue and lethargy. Patient is admitted for AMS and sepsis. Chart reviewed. Pt resides at [address]; was sent to PCP 4 days ago for urinary incontinence since August; diagnosed with UTI and treated with Macrobid. This morning, group home noted lethargy and inability to dress/ feed herself, which is different from her baseline; at baseline, alert and oriented, talks, walks, feeds, and dresses herself. No recent fevers or respiratory symptoms. Pt with hx of seizures, which are focal and prolonged per mother; eyes fix to the L, oromotor symptoms of chewing and drooling, and L hand twitches. Per mother, after seizure, pt becomes lethargic and very sleepy; last seizure was ~1 year ago. In the ED, VS significant for HR to 25. Labs significant for WBC 15.1, Cr 3.25, GFR 17; UA showed large LE and moderate blood with few crystals. LA 0.9 wnl. Blood cultures drawn prior to abx. EKG showed NSR, 84 bpm, QTc 463. CXR negative. Pt treated with 1L bolus NS. Per nursing, tech reported possible seizure while pt in restroom; pt \"slumped\" forward while on toilet, but did not LOC or become incontinent. Physical Exam:   Visit Vitals    /63    Pulse 90    Temp 97.4 °F (36.3 °C)    Resp 22    Ht 5' 4\" (1.626 m)    Wt 130 lb (59 kg)    LMP  (LMP Unknown)    SpO2 100%    BMI 22.31 kg/m2      General: lethargic, minimally verbally responsive, but opened her eyes when her name was called. HEENT: NCAT, oral mucosa dry. No scleral icterus   Cardiac: regular rate, normal rhythm  Lungs: no respiratory distress or wheezing. Unable to obtain good lung exam due to pt's lethargy at the time. Abdomen: soft, nondistended, nontender.  No guarding   Extremities: warm to touch, no edema. 2+ pulses intact. Neuro: unable to obtain due to mental status       A/P:  hx of severe MR, Bipolar, focal seizure, hypothyroidism who is admitted with Sepsis 2/2 UTI, AMS, and AGUS. Sepsis 2/2 UTI - SIRS 2/4 (WBC 15.1, RR 25) POA. S/p 2L bolus (30 mg/kg equates to ~ 1.8L), Ucx, Bcx drawn. Diagnosed with UTI 12/20 at PCP, and treated with macrobid; was supposed to discontinue abx as Ucx grew < 10K colonies. CXR negative. LA 0.9 wnl. UA showed large LE. Blood cultures drawn prior to abx.  - admit to telemetry, given hx of seizures   - VS per unit protocol  - Vanc, Cefepime for broad spectrum coverage until source of sepsis further clarified and sensitivities result. - s/p 2L bolus, MIVF   - follow up urine culture, blood culture, influenza. Daily CBC and BMP. - repeat LA this afternoon. - strict I&O     AMS - possibly 2/2 UTI vs seizure vs polypharmacy vs dehydration. Pt passed bedside swallow. Per tech, pt \"slumped forward\" while on toilet, but did not have LOC or incontinence; likely vaso-vagal event rather than seizure like activity. - resume home Cogentin, Depakote, Lithium, Synthroid, Risperdal, and Topamax.   - holding home Trazodone, Ambien, and Hydroxyzine due to AMS on presentation.   - follow up Lithium, Valproic Acid, and Topamax levels. Will adjust as indicated. - consult to neurology, appreciate recs   - seizure precaution, fall precaution    AGUS - Cr 3.25 and GFR 17 POA; baseline unclear given limited number of labs, but worsened compared to last labs in 2/2017. Worsening kidney function possibly 2/2 combination of dehydration and use of macrobid, given her kidney function. S/p 2L NS bolus. - MIVF   - daily CMP.  Repeat electrolytes this afternoon  - renal ultrasound  - urine lytes, FeNA  - if no improvement, or worsening kidney function, will consider nephrology consult   - strict I&O  - hold nephrotoxic medications    MR, Bipolar, hx of seizures - stable at group home. On Cogentin, Depakote, Lithium, Risperdal, and Topamax.  - continue Cogentin, Depakote, Lithium, Risperdal, and Topamax.  - follow up lab levels  - seizure precaution     Hypothyroid - on Synthroid; last TSH 1.01 on 2/2017  - continue Synthroid  - holding on rechecking TSH levels while inpatient, as acute illness can alter the TSH level. Prolonged QTc - POA QTc 463.   - avoid adding QTc prolonging medication    Patient seen, examined, and discussed with Dr. Saima Slade (PGY-1). For the remaining assessment and plan of other medical problems please refer to Dr. Ramona José H&P for more details.     Pt discussed with on-call attending physician, Dr. Damon Cage MD  Family Medicine Resident, PGY2

## 2017-12-24 NOTE — PROGRESS NOTES
[address] [address] Pharmacy Dosing Services: Antimicrobial Stewardship Progress Note  Cefepime dose change per renal P&T protocol  Physician: Dr Dash Pena  Indication: UTI/Group home  Day of Therapy: 1    Plan:    Non-Kinetic Antimicrobial Dosing:   Current Regimen:  Cefepime 1 gm IV q12hr  Recommendation: Changed Cefepime to 1 gm IV q24hr for now    Other Antimicrobial  (not dosed by pharmacist)   Vancomycin   Cultures     12/24/2017: Urine: pending   Serum Creatinine     Lab Results   Component Value Date/Time    Creatinine 3.25 12/24/2017 10:23 AM       Creatinine Clearance Estimated Creatinine Clearance: 21.7 mL/min (based on Cr of 3.25).      Temp   97.4 °F (36.3 °C)    WBC   Lab Results   Component Value Date/Time    WBC 15.1 12/24/2017 10:23 AM       H/H   Lab Results   Component Value Date/Time    HGB 10.9 12/24/2017 10:23 AM        Platelets   Lab Results   Component Value Date/Time    PLATELET 227 27/01/1507 10:23 AM        Pharmacist: Jc Little Contact information: [phone]

## 2017-12-24 NOTE — H&P
[address] [facility] [address] [place] [address]   Office [phone]  Fax [phone]       Admission H&P     Name: Javon Singh MRN: [id number]  Sex: Female   YOB: 1985  Age: 33 y.o. PCP: Lawrence Thomas NP     Source of Information: patient, medical records    Chief complaint: Fatigue, lethargy    History of Present Illness  Eileen Muniz is a 33 y.o. female with known Bipolar affective disorder, MR, h/o partial/focal seizure, psychosis and hypothyroidism who presents to the ER complaining of fatigue, lethargy. History unable to be obtained from patient as she was lethargic. History provided by mom. Patient lives in group home, per mom they called her because pt couldn't bathe herself today and was very lethargic, no fever, no cough (that she knows of). EMS was called and she was brought to the ED. Mom reports that patient has been having urine incontinence since August and last Tuesday she was seen at PCP and diagnosed with UTI, she was being treated with Macrobid. Mom also reports that at baseline pt is able to dress, feed herself, takes her medications. Last seizure was 1 year ago and before that it was several years ago. Mom says that when patient has a seizure, her eyes stick to the left side, starts to chews, drool and her left hand starts pulsating. Mom thinks she may had a seizure before coming in because she stays sleeps a lot and looks lethargic after seizure. In the ED vitals were unremarkable. Labs were remarkable for WBC 15.1, Cr 3.25 (baseline approximately 1.5), GFR 17 (baseline approximately 47). UA was cloudy with moderate blood, large leukocyte esterase, WBC 10-20 and few amorphous crystals. In the ED she was treated with 1 bolus (1L) of NS.     Past Medical History:   Diagnosis Date    Bipolar affective ([address] 11/8/2010    MR (mental retardation), severe     Seizures ([address]       Patient Vitals for the past 12 hrs:   Temp Pulse Resp BP SpO2   12/24/17 1046 - - - - 98 % 12/24/17 1045 - - - 97/76 -   12/24/17 1005 97.4 °F (36.3 °C) 83 18 103/81 96 %       Home Medications   Prior to Admission medications    Medication Sig Start Date End Date Taking? Authorizing Provider   divalproex ER (DEPAKOTE ER) 500 mg ER tablet Take 500 mg by mouth two (2) times a day. Yes Historical Provider   risperiDONE (RISPERDAL) 2 mg tablet Take 2 mg by mouth daily. Yes Historical Provider   topiramate (TOPAMAX) 200 mg tablet Take 200 mg by mouth nightly. Yes Historical Provider   fluticasone (FLONASE) 50 mcg/actuation nasal spray 2 Sprays by Both Nostrils route daily. Yes Historical Provider   nitrofurantoin, macrocrystal-monohydrate, (MACROBID) 100 mg capsule Take 1 Cap by mouth two (2) times a day for 7 days. 12/20/17 12/27/17 Yes Daniella Alaniz MD   cetirizine (ZYRTEC) 10 mg tablet Take 1 Tab by mouth daily. 9/15/17  Yes Sagar Mast NP   levothyroxine (SYNTHROID) 75 mcg tablet Take 1 po qam. 9/15/17  Yes Sagar Mast NP   drospirenone-ethinyl estradiol (ANAYA) 3-0.02 mg tab Take 1 Tab by mouth daily. 9/15/17  Yes Sagar Mast NP   naproxen (NAPROSYN) 500 mg tablet Take 1 Tab by mouth two (2) times daily as needed. (with meals) 6/23/17  Yes Jamison De La Paz NP   hydrOXYzine (VISTARIL) 25 mg capsule Take 25 mg by mouth three (3) times daily as needed for Anxiety (aggression). Yes Historical Provider   zolpidem (AMBIEN) 5 mg tablet Take  by mouth nightly as needed for Sleep. Yes Historical Provider   traZODone (DESYREL) 100 mg tablet Take 100 mg by mouth nightly. Yes Historical Provider   benztropine (COGENTIN) 1 mg tablet Take 1 Tab by mouth two (2) times a day. 3/27/15  Yes Sagar Mast NP   lithium carbonate CR (ESKALITH CR) 450 mg CR tablet Take 1 Tab by mouth two (2) times a day. 3/27/15  Yes Sagar Mast NP   diazepam (VALIUM) 2 mg tablet Take 1 tablet by mouth as needed for Anxiety.  Take 1 hr prior to procedure, may repeat 15 min before procedure as well 2/4/15 Yes Mili Kaur NP       Allergies  Allergies   Allergen Reactions    Phenobarbital Unknown (comments)     Unable to obtain       Past Medical History:   Diagnosis Date    Bipolar affective ([facility] 11/8/2010    MR (mental retardation), severe     Seizures ([facility]        History reviewed. No pertinent surgical history. History reviewed. No pertinent family history. Social History  Social History     Social History    Marital status: SINGLE     Spouse name: N/A    Number of children: N/A    Years of education: N/A     Occupational History    Not on file. Social History Main Topics    Smoking status: Never Smoker    Smokeless tobacco: Never Used    Alcohol use No    Drug use: No    Sexual activity: No     Other Topics Concern    Not on file     Social History Narrative       Alcohol history: Not at all  Smoking history: Non-smoker  Illicit drug history: Not at all  Living arrangement: patient lives in an adult home. Ambulates: Independently     Review of Systems  Review of Systems   Unable to perform ROS: Mental status change       Physical Exam  Objective:  General Appearance:  Comfortable and in no acute distress. Vital signs: (most recent): Blood pressure 113/63, pulse 90, temperature 97.4 °F (36.3 °C), resp. rate 22, height 5' 4\" (1.626 m), weight 130 lb (59 kg), SpO2 100 %. HEENT: (Oral mucosa dry )    Lungs:  Normal respiratory rate and normal effort. Breath sounds clear to auscultation. Heart: Normal rate. Regular rhythm. S1 normal and S2 normal.    Extremities: Normal range of motion. There is no dependent edema. Neurological: (Lethargic. Would open her eyes when calling her name but go back to sleep. ). Skin:  Warm and dry. Abdomen: Abdomen is soft. Bowel sounds are normal.   There is no abdominal tenderness. Pulses: Distal pulses are intact.         O2 Device: Room air     Laboratory Data  Recent Results (from the past 8 hour(s))   URINALYSIS W/MICROSCOPIC Collection Time: 12/24/17 10:23 AM   Result Value Ref Range    Color YELLOW/STRAW      Appearance CLOUDY (A) CLEAR      Specific gravity 1.006 1.003 - 1.030      pH (UA) 6.5 5.0 - 8.0      Protein 100 (A) NEG mg/dL    Glucose NEGATIVE  NEG mg/dL    Ketone NEGATIVE  NEG mg/dL    Bilirubin NEGATIVE  NEG      Blood MODERATE (A) NEG      Urobilinogen 0.2 0.2 - 1.0 EU/dL    Nitrites NEGATIVE  NEG      Leukocyte Esterase LARGE (A) NEG      WBC 10-20 0 - 4 /hpf    RBC 0-5 0 - 5 /hpf    Epithelial cells FEW FEW /lpf    Bacteria NEGATIVE  NEG /hpf    Amorphous Crystals FEW (A) NEG     CBC WITH AUTOMATED DIFF    Collection Time: 12/24/17 10:23 AM   Result Value Ref Range    WBC 15.1 (H) 3.6 - 11.0 K/uL    RBC 3.82 3.80 - 5.20 M/uL    HGB 10.9 (L) 11.5 - 16.0 g/dL    HCT 32.7 (L) 35.0 - 47.0 %    MCV 85.6 80.0 - 99.0 FL    MCH 28.5 26.0 - 34.0 PG    MCHC 33.3 30.0 - 36.5 g/dL    RDW 12.7 11.5 - 14.5 %    PLATELET 959 699 - 002 K/uL    NEUTROPHILS 85 (H) 32 - 75 %    LYMPHOCYTES 6 (L) 12 - 49 %    MONOCYTES 6 5 - 13 %    EOSINOPHILS 3 0 - 7 %    BASOPHILS 0 0 - 1 %    ABS. NEUTROPHILS 12.9 (H) 1.8 - 8.0 K/UL    ABS. LYMPHOCYTES 0.9 0.8 - 3.5 K/UL    ABS. MONOCYTES 0.9 0.0 - 1.0 K/UL    ABS. EOSINOPHILS 0.4 0.0 - 0.4 K/UL    ABS.  BASOPHILS 0.0 0.0 - 0.1 K/UL   METABOLIC PANEL, BASIC    Collection Time: 12/24/17 10:23 AM   Result Value Ref Range    Sodium 142 136 - 145 mmol/L    Potassium 3.6 3.5 - 5.1 mmol/L    Chloride 108 97 - 108 mmol/L    CO2 24 21 - 32 mmol/L    Anion gap 10 5 - 15 mmol/L    Glucose 97 65 - 100 mg/dL    BUN 35 (H) 6 - 20 MG/DL    Creatinine 3.25 (H) 0.55 - 1.02 MG/DL    BUN/Creatinine ratio 11 (L) 12 - 20      GFR est AA 20 (L) >60 ml/min/1.73m2    GFR est non-AA 17 (L) >60 ml/min/1.73m2    Calcium 9.2 8.5 - 10.1 MG/DL   LACTIC ACID    Collection Time: 12/24/17 11:07 AM   Result Value Ref Range    Lactic acid 0.9 0.4 - 2.0 MMOL/L       Imaging  CXR Results  (Last 48 hours)    None        CT Results  (Last 48 hours)    None          EKG: normal sinus rhythm, prolonged QT interval.      Assessment and Plan     Nyasia Melton is a 33 y.o. female with known Bipolar affective disorder, MR, h/o partial/focal seizure, psychosis and hypothyroidism who is admitted for AMS, AGUS and sepsis 2/2 UTI. Sepsis 2/2 UTI with 2/4 SIRS criteria- with WBC 15.1, RR 25. H/o frequent urinary incontinence and diagnosed with UTI on 12/20 which was treated with Macrobid. UA POA was cloudy with moderate blood, large leukocyte esterase, WBC 10-20 and few amorphous crystals. Pt is s/p 2 L bolus. LA 0.9.  - Admit to telemetry  - Start Vancomycin and Cefepime until source of sepsis is further clarified  - Blood and Urine cultures  - Repeat LA  - MIVF  - Daily CBC, BMP    Leukocytosis- WBC POA 15.1. UA with signs of infection.  - Abx started. See above   - Blood and urine culture  - CXR  - Influenza    AGUS- Cr POA 3.25 (baseline approximately 1.5), GFR 17 (baseline approximately 47). No sufficient data on chart reviewing to know true baseline but kidney functions is worse when compared to previous labs in 02/2017. Could be 2/2 dehydration vs. Polypharmacy. Patient looks dry on examination. She's s/p 2 L of NS  - MIVF  - CK level  - Urine osmolality, sodium and creatinine to calculate FeNA  - Renal ultrasound   - Strict Is&Os  - Avoid nephrotoxic agents  - BMP in 4 hours  - Continue to monitor with daily CMP  - If no improvement or there's worsening kidney function, consider Nephrology consult     AMS- could be 2/2 dehydration vs. UTI vs. Polypharmacy vs. Seizure? Laz Murguia Mother reports that this is not her baseline. She has a h/o focal/partial seizures. Pt looks lethargic, would open her eyes on calling her name. Per tech, pt \"slumped forward\" while on toilet and was rocking back and forward but didn't have LOC or incontinence.    - Bedside swallow test passed   - UDS  - Lithium, Valproic acid and Topiramate level  - Seizure and fall precautions   - Consulted Neurology, appreciate recommendations     Anemia- Hgb POA 10.9. No sufficient data on chart to know baseline.   - Iron profile, ferritin, haptoglobin, LD  - Consider giving Iron, although this would make her more constipated. Prolong QTc- QTc   - Avoid QTc prolonging medications    H/o Seizures- AMS could be 2/2 seizures. This is questionable at this moment. Report from mother, last seizure was one year ago and before that it was several years ago. Mom says that after she has a seizure patient becomes very lethargic.  - Continue home medication of Depakote 500 mg BID, Cogentin 1 mg BID and Topamax 200 mg QHS  - Seizure precautions    Bipolar affective disorder- stable. Pt followed OP by Dr. Saadia Terrazas  - Continue home medications of Lithium 450 mg BID and Risperidone 2 mg daily    Hypothyroidism- Last TSH in 02/2017 was 1.010  - Continue home medications of Synthroid 75 mcg daily     Constipation- mom reports that patient has a h/o constipation and has taken Colace in the past  - Colace 100 mg daily PRN    FEN/GI - Regular diet. NS at 75 mL/hr. Activity - Bedrest  DVT prophylaxis - Sub Q Heparin  GI prophylaxis - Not indicated at this time  Fall prophylaxis - Fall precautions ordered. Disposition - Admit to Telemetry. Plan to d/c to Group home. Consulting PT/OT/CM  Code Status - Full, discussed with patient / caregivers. Group home charge nurse: [phone]    Patient Blanca Ferris will be discussed Dr. Brandon Jasmine.     12:02 PM, 12/24/17  Radha Salgado MD  Family Medicine Resident       For Billing    Chief Complaint   Patient presents with    Fatigue   [facility] Problems  Date Reviewed: 12/20/2017          Codes Class Noted POA    UTI (urinary tract infection) ICD-10-CM: N39.0  ICD-9-CM: 599.0  12/24/2017 Unknown        Altered mental status ICD-10-CM: R41.82  ICD-9-CM: 780.97  12/24/2017 Unknown        Acute kidney injury [facility]) ICD-10-CM: N17.9  ICD-9-CM: 584.9  12/24/2017 Unknown

## 2017-12-24 NOTE — ED PROVIDER NOTES
HPI Comments: Caroline Collier is a 33 y.o. female who presents by way of EMS to the ED from group home with  c/o increased lethargy and generalized weakness. Per EMS, care takers and mother, patient was in the bathtub today when she became increasingly lethargic and the care takers had a hard time getting her out of the bathtub. Patient has been incontinent of bladder since August, with a history of frequent urinary tract infections. Patient is currently being treated for a urinary tract infection. Mother also states the patient suffers from focal seizures, but is unsure if patient had one tiday. Patient with chills. There are no further complaints at this time. PCP: Bam Morales NP    PMHx significant for: Past Medical History:  11/8/2010: Bipolar affective ([address]  No date: MR (mental retardation), severe  No date: Seizures ([address]    PSHx significant for: History reviewed. No pertinent surgical history. Social Hx: Tobacco: never smoked EtOH: none Illicit drug use: none    There are no further complaints or symptoms at this time. Patient is a 33 y.o. female presenting with seizures. The history is provided by the patient. Seizure    Pertinent negatives include no headaches, no speech difficulty, no visual disturbance, no sore throat, no chest pain, no nausea and no vomiting. She reports no chest pain, no visual disturbance, no vomiting, no headaches, no sore throat, no speech difficulty. Past Medical History:   Diagnosis Date    Bipolar affective ([address] 11/8/2010    MR (mental retardation), severe     Seizures ([address]        History reviewed. No pertinent surgical history. History reviewed. No pertinent family history. Social History     Social History    Marital status: SINGLE     Spouse name: N/A    Number of children: N/A    Years of education: N/A     Occupational History    Not on file.      Social History Main Topics    Smoking status: Never Smoker    Smokeless tobacco: Never Used    Alcohol use No    Drug use: No    Sexual activity: No     Other Topics Concern    Not on file     Social History Narrative         ALLERGIES: Phenobarbital    Review of Systems   Constitutional: Positive for activity change (increasing lethargy) and chills. Negative for appetite change, diaphoresis, fatigue and fever. HENT: Negative for congestion, ear discharge, ear pain, sinus pain, sinus pressure, sore throat and trouble swallowing. Eyes: Negative for photophobia, pain, redness and visual disturbance. Respiratory: Negative for chest tightness, shortness of breath and wheezing. Cardiovascular: Negative for chest pain and palpitations. Gastrointestinal: Negative for abdominal distention, abdominal pain, nausea and vomiting. Endocrine: Negative. Genitourinary: Positive for difficulty urinating and dysuria (urinary incontinence). Negative for flank pain, frequency and urgency. Musculoskeletal: Negative for back pain, neck pain and neck stiffness. Skin: Negative for color change, pallor, rash and wound. Allergic/Immunologic: Negative. Neurological: Negative for dizziness, speech difficulty, weakness and headaches. Hematological: Does not bruise/bleed easily. Psychiatric/Behavioral: Negative for behavioral problems. The patient is not nervous/anxious. Vitals:    12/24/17 1445 12/24/17 1500 12/24/17 1554 12/24/17 1600   BP: (!) 131/93 108/79  103/81   Pulse:  86  85   Resp:       Temp:   98.5 °F (36.9 °C)    SpO2: 96% 96%  97%   Weight:       Height:                Physical Exam   Constitutional: She is oriented to person, place, and time. She appears well-developed and well-nourished. No distress. Patient with chills. HENT:   Head: Normocephalic and atraumatic.    Right Ear: External ear normal.   Left Ear: External ear normal.   Nose: Nose normal.   Mouth/Throat: Oropharynx is clear and moist.   Eyes: Conjunctivae and EOM are normal. Pupils are equal, round, and reactive to light. Right eye exhibits no discharge. Left eye exhibits no discharge. Neck: Normal range of motion. Neck supple. No JVD present. No tracheal deviation present. Cardiovascular: Normal rate, regular rhythm, normal heart sounds and intact distal pulses. Exam reveals no gallop. No murmur heard. Pulmonary/Chest: Effort normal and breath sounds normal. No respiratory distress. She has no wheezes. She has no rales. She exhibits no tenderness. Abdominal: Soft. Bowel sounds are normal. She exhibits no distension. There is no tenderness. There is no rebound and no guarding. Genitourinary:   Genitourinary Comments: Patient incontinent of bowel and bladder, baseline. Musculoskeletal: Normal range of motion. She exhibits no edema or tenderness. Neurological: She is alert and oriented to person, place, and time. Skin: Skin is warm and dry. No rash noted. No erythema. No pallor. Psychiatric: She has a normal mood and affect. Her behavior is normal. Judgment and thought content normal.   Nursing note and vitals reviewed. MDM  Number of Diagnoses or Management Options  Acute kidney insufficiency: new and requires workup  Diagnosis management comments: Plan:  Admit to family practice service. Amount and/or Complexity of Data Reviewed  Clinical lab tests: ordered and reviewed      ED Course   1045: Reviewed available labs.   1057: Family practice consulted for admission to the hospital.      Procedures

## 2017-12-24 NOTE — ED NOTES
TRANSFER - OUT REPORT:    Verbal report given to April RN(name) on Darnell Tan  being transferred to 3rd floor(unit) for routine progression of care       Report consisted of patients Situation, Background, Assessment and   Recommendations(SBAR). Information from the following report(s) SBAR, Kardex, ED Summary, Procedure Summary, Intake/Output, Accordion, Recent Results and Cardiac Rhythm NSR was reviewed with the receiving nurse. Lines:   Peripheral IV 12/24/17 Left Antecubital (Active)   Site Assessment Clean, dry, & intact 12/24/2017 10:21 AM   Phlebitis Assessment 0 12/24/2017 10:21 AM   Infiltration Assessment 0 12/24/2017 10:21 AM   Dressing Status Clean, dry, & intact 12/24/2017 10:21 AM        Opportunity for questions and clarification was provided.       Patient transported with:   Monitor  Registered Nurse

## 2017-12-24 NOTE — IP AVS SNAPSHOT
[address] 
[phone] Patient: Sarah Cook MRN: [id number] BOP:4/50/6097 My Medications STOP taking these medications   
 lithium carbonate  mg CR tablet Commonly known as:  ESKALITH CR  
   
  
 naproxen 500 mg tablet Commonly known as:  NAPROSYN  
   
  
 nitrofurantoin (macrocrystal-monohydrate) 100 mg capsule Commonly known as:  MACROBID  
   
  
  
TAKE these medications as instructed Instructions Each Dose to Equal  
 Morning Noon Evening Bedtime  
 benztropine 1 mg tablet Commonly known as:  COGENTIN Your last dose was: Your next dose is: Take 1 Tab by mouth two (2) times a day. 1 mg  
    
   
   
   
  
 cetirizine 10 mg tablet Commonly known as:  ZYRTEC Your last dose was: Your next dose is: Take 1 Tab by mouth daily. 10 mg  
    
   
   
   
  
 DEPAKOTE  mg ER tablet Generic drug:  divalproex ER Your last dose was: Your next dose is: Take 500 mg by mouth two (2) times a day. 500 mg  
    
   
   
   
  
 diazePAM 2 mg tablet Commonly known as:  VALIUM Your last dose was: Your next dose is: Take 1 tablet by mouth as needed for Anxiety. Take 1 hr prior to procedure, may repeat 15 min before procedure as well  
 2 mg  
    
   
   
   
  
 docusate sodium 100 mg capsule Commonly known as:  Delonte Moralesa Your last dose was: Your next dose is: Take 1 Cap by mouth two (2) times daily as needed for Constipation for up to 90 days. 100 mg  
    
   
   
   
  
 drospirenone-ethinyl estradiol 3-0.02 mg Tab Commonly known as:  ANAYA Your last dose was: Your next dose is: Take 1 Tab by mouth daily. 1 Tab FLONASE 50 mcg/actuation nasal spray Generic drug:  fluticasone Your last dose was: Your next dose is: 2 Sprays by Both Nostrils route daily. 2 Spray  
    
   
   
   
  
 hydrOXYzine pamoate 25 mg capsule Commonly known as:  VISTARIL Your last dose was: Your next dose is: Take 25 mg by mouth three (3) times daily as needed for Anxiety (aggression). 25 mg  
    
   
   
   
  
 levothyroxine 75 mcg tablet Commonly known as:  SYNTHROID Your last dose was: Your next dose is: Take 1 po qam.  
     
   
   
   
  
 risperiDONE 2 mg tablet Commonly known as:  RisperDAL Your last dose was: Your next dose is: Take 2 mg by mouth daily. 2 mg  
    
   
   
   
  
 topiramate 200 mg tablet Commonly known as:  TOPAMAX Your last dose was: Your next dose is: Take 200 mg by mouth nightly. 200 mg  
    
   
   
   
  
 traZODone 100 mg tablet Commonly known as:  Robin Lorenzo Your last dose was: Your next dose is: Take 100 mg by mouth nightly. 100 mg  
    
   
   
   
  
 zolpidem 5 mg tablet Commonly known as:  AMBIEN Your last dose was: Your next dose is: Take  by mouth nightly as needed for Sleep. Where to Get Your Medications Information on where to get these meds will be given to you by the nurse or doctor. ! Ask your nurse or doctor about these medications  
  docusate sodium 100 mg capsule

## 2017-12-24 NOTE — ED TRIAGE NOTES
Patient arrived with ems from [facility]. Care takers state she has been more lethargic with generalized weakness, with seizures today, hx of seizures. Mother is at bedside and reports that since august she has been incontinent of urine at night. Mother reports her seizures are focal with eyes fixed to the left when she has them. Patient is currently being treated for uti with macrobid.  Patient is resting in bed, bed in low position, call bell in reach, monitor x2, seizure pads in place, mother at bedside,

## 2017-12-24 NOTE — IP AVS SNAPSHOT
[address] [place] [address] 
[phone] Patient: Rosa Mary MRN: [id number] URT:[id number] About your hospitalization You were admitted on:  December 24, 2017 You last received care in the:  [facility]  MED SURG 2 You were discharged on:  December 27, 2017 Why you were hospitalized Your primary diagnosis was:  Not on File Your diagnoses also included:  Uti (Urinary Tract Infection), Altered Mental Status, Acute Kidney Injury (Hcc) Things You Need To Do (next 8 weeks) Wednesday Jan 03, 2018 ESTABLISHED PATIENT with Kyle Reyez NP at  9:45 AM  
Where:  [address] [facility] ([facility]) Discharge Orders None A check marielle indicates which time of day the medication should be taken. My Medications STOP taking these medications   
 lithium carbonate  mg CR tablet Commonly known as:  ESKALITH CR  
   
  
 naproxen 500 mg tablet Commonly known as:  NAPROSYN  
   
  
 nitrofurantoin (macrocrystal-monohydrate) 100 mg capsule Commonly known as:  MACROBID  
   
  
  
TAKE these medications as instructed Instructions Each Dose to Equal  
 Morning Noon Evening Bedtime  
 benztropine 1 mg tablet Commonly known as:  COGENTIN Your last dose was: Your next dose is: Take 1 Tab by mouth two (2) times a day. 1 mg  
    
   
   
   
  
 cetirizine 10 mg tablet Commonly known as:  ZYRTEC Your last dose was: Your next dose is: Take 1 Tab by mouth daily. 10 mg  
    
   
   
   
  
 DEPAKOTE  mg ER tablet Generic drug:  divalproex ER Your last dose was: Your next dose is: Take 500 mg by mouth two (2) times a day. 500 mg  
    
   
   
   
  
 diazePAM 2 mg tablet Commonly known as:  VALIUM Your last dose was: Your next dose is: Take 1 tablet by mouth as needed for Anxiety. Take 1 hr prior to procedure, may repeat 15 min before procedure as well  
 2 mg  
    
   
   
   
  
 docusate sodium 100 mg capsule Commonly known as:  Deya Lyn Your last dose was: Your next dose is: Take 1 Cap by mouth two (2) times daily as needed for Constipation for up to 90 days. 100 mg  
    
   
   
   
  
 drospirenone-ethinyl estradiol 3-0.02 mg Tab Commonly known as:  ANAYA Your last dose was: Your next dose is: Take 1 Tab by mouth daily. 1 Tab FLONASE 50 mcg/actuation nasal spray Generic drug:  fluticasone Your last dose was: Your next dose is: 2 Sprays by Both Nostrils route daily. 2 Spray  
    
   
   
   
  
 hydrOXYzine pamoate 25 mg capsule Commonly known as:  VISTARIL Your last dose was: Your next dose is: Take 25 mg by mouth three (3) times daily as needed for Anxiety (aggression). 25 mg  
    
   
   
   
  
 levothyroxine 75 mcg tablet Commonly known as:  SYNTHROID Your last dose was: Your next dose is: Take 1 po qam.  
     
   
   
   
  
 risperiDONE 2 mg tablet Commonly known as:  RisperDAL Your last dose was: Your next dose is: Take 2 mg by mouth daily. 2 mg  
    
   
   
   
  
 topiramate 200 mg tablet Commonly known as:  TOPAMAX Your last dose was: Your next dose is: Take 200 mg by mouth nightly. 200 mg  
    
   
   
   
  
 traZODone 100 mg tablet Commonly known as:  Jus Span Your last dose was: Your next dose is: Take 100 mg by mouth nightly. 100 mg  
    
   
   
   
  
 zolpidem 5 mg tablet Commonly known as:  AMBIEN Your last dose was: Your next dose is: Take  by mouth nightly as needed for Sleep. Where to Get Your Medications Information on where to get these meds will be given to you by the nurse or doctor. ! Ask your nurse or doctor about these medications  
  docusate sodium 100 mg capsule Discharge Instructions REHAB / SKILLED [facility]URSING FACILITY DISCHARGE INSTRUCTIONS Nyasia Melton / [id number] : 1985 Admission date: 2017 Discharge date: 2017 Primary care provider: Lexa Mckinnon NP Discharging provider:  Eric Saravia MD  - Family Medicine Resident Alexis Brandon MD - Attending, Family Medicine Laz Murguia . . . . . . . . . . . . . . . . . . . . . . . . . . . . . . . . . . . . . . . . . . . . . . . . . . . . . . . . . . . . . . . . . . . . . . Laz Murguia FINAL DIAGNOSES & HOSPITAL COURSE: 
AGUS- Cr POA 3.25 (baseline approximately 1.5), GFR 17 (baseline approximately 47). 2/2 dehydration vs. Polypharmacy. Received IVF and seems to be hydrated on PE. CK 21 (L). FeNA 2.63%. Renal ultrasound without acute findings. Last Cr check 1.71.  
- Monitor with Basic metabolic panel - Stop taking Naproxen as this can worsen kidney function - If no improvement or there's worsening kidney function, consider Nephrology consult  
  
Sepsis 2/2 UTI with 2/4 SIRS criteria- resolved. With WBC 15.1, RR 25 POA. H/o frequent urinary incontinence and diagnosed with UTI on  which was treated with Macrobid. UA POA was cloudy with moderate blood, large leukocyte esterase, WBC 10-20 and few amorphous crystals. Pt is s/p 2 L bolus. LA 0.9-->1.0. Improved WBC 7. Final urine culture <1000 colonies, no growth x1 day. BCx no growth x3 days. She received Vancomycin and Cefepime for 2 days. Leukocytosis- resolved. WBC POA 15.1--> 7.0. UA with signs of infection but negative urine culture. Chest xray showed no acute process. Rapid influenza negative.  
   
Metabolic encephalopathy- resolved.  In the setting of AMS due to dehydration, UTI and possible polypharmacy. UDS negative. Initial Lithium was 2.78--> 1.9 --> 1.09 (now normal). Topiramate level normal. Neurology consulted and they agreed this could be 2/2 dehydration. 
- F/u Valproic acid level 
   
Prolong QTc- QTc . Avoid QTc prolonging medications 
   
H/o Seizures- initially we thought seizure activity was probably a component of AMS but dehydration was the most likely cause as patient status improved after hydration. Continue home medication of Depakote 500 mg BID, Cogentin 1 mg BID and Topamax 200 mg QHS 
   
Bipolar affective disorder-  Pt with toxic levels of lithium. Last lithium check 1.09 (normal). Psychiatry evaluated the patient and said to discontinue Lithium. Pt followed OP by Dr. Robyn Cárdenas. Continue home medication of Risperidone 2 mg daily.  
- Stop taking Lithium  
   
Hypothyroidism- Last TSH in 02/2017 was 1.010. Continue home medications of Synthroid 75 mcg daily.  
   
Constipation- mom reports that patient has a h/o constipation and has taken Colace in the past.  
- Colace 100 mg daily PRN 
 
 
FOLLOW-UP CARE RECOMMENDATIONS: 
Follow-up Information Follow up With Details Comments Contact Info Sakshi Tafoya NP On 1/3/2018 At 9:45 am for hospitalization follow up  [address] [place] [address] 
[phone] It is very important that you keep follow-up appointment(s). Bring discharge papers, medication list (and/or medication bottles) to follow-up appointments for review by outpatient provider(s). FOLLOW-UP TESTS RECOMMENDED:  
· Basic metabolic panel (BMP) to check sodium and creatinine · STOP lithium · STOP Naproxen · Use [place] only as needed for sleep ONGOING TREATMENT PLAN: See above PENDING TEST RESULTS: 
At the time of discharge the following test results are still pending: Valproic acid level. Please review these results as they become available. Specific symptoms to watch for: chest pain, shortness of breath, fever, chills, nausea, vomiting, diarrhea, change in mentation, falling, weakness, bleeding. DIET:  Regular Diet ACTIVITY:  Per PT/OT 
 
WOUND CARE: none EQUIPMENT needed:  none INCIDENTAL FINDINGS:  none GOALS OF CARE: 
  Eventual return to home/independent/assisted living X  Group home Hospice No rehospitalization Patient condition at discharge:  
Functional status Poor X  Deconditioned Independent Cognition Valri Ion Forgetful (some sensescence) X  Intellectual disability Catheters/lines (plus indication) Zelaya PICC   
  PEG   
X  None Code status X  Full code DNR Juan Sanchez . . . . . . . . . . . . . . . . . . . . . . . . . . . . . . . . . . . . . . . . . . . . . . . . . . . . . . . . . . . . . . . . . . . . . . Juan Sanchez CHRONIC MEDICAL CONDITIONS: 
Problem List as of 12/27/2017  Date Reviewed: 12/20/2017 Codes Class Noted - Resolved UTI (urinary tract infection) ICD-10-CM: N39.0 ICD-9-CM: 599.0  12/24/2017 - Present Altered mental status ICD-10-CM: R41.82 
ICD-9-CM: 780.97  12/24/2017 - Present Acute kidney injury ([facility] ICD-10-CM: N17.9 ICD-9-CM: 584.9  12/24/2017 - Present Hypothyroid ICD-10-CM: E03.9 ICD-9-CM: 244.9  1/29/2015 - Present Psychosis ICD-10-CM: F29 
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 
ICD-9-CM: 318.1  11/8/2010 - Present Information obtained by :  
 I understand that if any problems occur once I am at home I am to contact my physician. I understand and acknowledge receipt of the instructions indicated above. Physician's or R.N.'s Signature                                                                  Date/Time Patient or Representative Signature                                                          Date/Time [employer] Announcement We are excited to announce that we are making your provider's discharge notes available to you in [employer]. You will see these notes when they are completed and signed by the physician that discharged you from your recent hospital stay. If you have any questions or concerns about any information you see in [employer], please call the Health Information Department where you were seen or reach out to your Primary Care Provider for more information about your plan of care. Introducing [facility]! Dear Olegario Meade: Thank you for requesting a [employer] account. Our records indicate that you have previously registered for a [employer] account but its currently inactive. Please call our [employer] support line at [phone]. Additional Information If you have questions, please visit the Frequently Asked Questions section of the [employer] website at [email or web address] Remember, [employer] is NOT to be used for urgent needs. For medical emergencies, dial 911. Now available from your iPhone and Android! Unresulted Labs-Please follow up with your PCP about these lab tests Order Current Status VALPROIC ACID, FREE In process CULTURE, BLOOD Preliminary result Providers Seen During Your Hospitalization Provider Specialty Primary office phone Steve Barlow MD Emergency Medicine [phone] Jessica Singleton MD Family Practice [phone] Your Primary Care Physician (PCP) Primary Care Physician Office Phone Office Fax Ashu Kelly [phone] You are allergic to the following Allergen Reactions Phenobarbital Unknown (comments) Unable to obtain Recent Documentation Height Weight BMI OB Status Smoking Status 1.626 m 70.1 kg 26.53 kg/m2 Having regular periods Never Smoker Emergency Contacts Name Discharge Info Relation Home Work Mobile Peri Joshua DISCHARGE CAREGIVER [3] Other Relative [6] [phone] [facility] CAREGIVER [3] Other Relative [6] [phone] Betzaida Sparrow DISCHARGE CAREGIVER [3] Other Relative [6] [phone] Patient Belongings The following personal items are in your possession at time of discharge: 
                             
 
  
  
 Please provide this summary of care documentation to your next provider. Signatures-by signing, you are acknowledging that this After Visit Summary has been reviewed with you and you have received a copy. Patient Signature:  ____________________________________________________________ Date:  ____________________________________________________________  
  
[facility] Provider Signature:  ____________________________________________________________ Date:  ____________________________________________________________

## 2017-12-24 NOTE — ED NOTES
Tech reported possible seizure while in restroom. Tech reports pt \"Slumped\" forward while on toilet. Pt did not lose consciousness or become incontinent. Family Practice MD notified. EKG completed and family practice notified.

## 2017-12-24 NOTE — PROGRESS NOTES
[facility] Dosing Services: Antimicrobial Stewardship Progress Note    Consult for antibiotic dosing of vancomycin by Dr. Ganga Gonzalez  Pharmacist reviewed antibiotic appropriateness for 33year old , female  for indication of UTI with leukocytosis & AMS  Day of Therapy 1    Plan:  Vancomycin therapy:   Start Vancomycin therapy, with loading dose of 1000 mg IV x 1, dosed at ~15 mg/kg for CrCl < 30 mL/min (given in the ED 12/24 @ [address] 2006)  [address] will dose by level and re-dose when level < 10 mcg/mL   Dose calculated to approximate a therapeutic trough of 10-15 mcg/mL     Last trough level / Plan for level: Plan for level 24-48 hours after initial dose (not yet ordered)  [address] to follow daily and will make changes to dose and/or frequency based on clinical status. Non-Kinetic Antimicrobial Dosing:   Current Regimen:  Cefepime 1 gm IV q12hr  Recommendation: Changed Cefepime to 1 gm IV q24hr for now    Other Antimicrobial  (not dosed by pharmacist)   none   Cultures     12/24 Blood - (pending)  12/24 Urine - (pending)   Serum Creatinine     Lab Results   Component Value Date/Time    Creatinine 3.25 12/24/2017 10:23 AM       Creatinine Clearance Estimated Creatinine Clearance: 21.7 mL/min (based on Cr of 3.25). Temp   97.4 °F (36.3 °C)    WBC   Lab Results   Component Value Date/Time    WBC 15.1 12/24/2017 10:23 AM       H/H   Lab Results   Component Value Date/Time    HGB 10.9 12/24/2017 10:23 AM        Platelets   Lab Results   Component Value Date/Time    PLATELET 196 98/02/9925 10:23 AM        Thank you for the consult,  Jose G HARRIS [facility]

## 2017-12-24 NOTE — PROGRESS NOTES
[facility]: MED RECONCILIATION    Comments/Recommendations:    Medications managed by caregivers at group home. Telephone conversation with caregiver provided PTA medication information. Patient has had her morning medications.  DDI (Topamax + oral contraceptives) - Topiramate reduces effectiveness of oral contraceptives and can cause spotting/bleeding.  Depakote  mg - Per caregiver, patient takes 1 tablet twice daily. Generally, this formulation is given once daily, but they split her 1000 mg daily dose into 500 mg ER twice daily instead. The Depakote DR formulation could also be considered for twice daily dosing. Medications added:     · Flonase nasal spray - replaces mometasone    Medications removed:    · Hydrocortisone lotion  · Ibuprofen - uses naproxen PRN  · Mometasone nasal spray - uses Flonase  · Promethazine  · Temazepam - takes zolpidem    Medications adjusted:    · Risperidone - takes 2 mg PO daily per group home/caregiver  · Depakote 500 mg ER - instead of taking 1000 mg ER all at once, she takes 500 mg ER twice daily now  · Hydroxyzine - takes 25 mg PO TID PRN for anxiety/aggression (not itching per caregiver)    Information obtained from: patient's mother, group home/cargiver via phone (provided [facility] records of current scheduled and PRN medications as well as last dosages)    Significant PMH/Disease States:   Past Medical History:   Diagnosis Date    Bipolar affective ([facility] 11/8/2010    MR (mental retardation), severe     Seizures ([facility]      Chief Complaint for this Admission:   Chief Complaint   Patient presents with    Fatigue    Seizure     Allergies: Phenobarbital    Prior to Admission Medications:     Medication Documentation Review Audit       Reviewed by DOUGIE AshrafD (Pharmacist) on 12/24/17 at 1128         Medication Sig Documenting Provider Last Dose Status Taking?      benztropine (COGENTIN) 1 mg tablet Take 1 Tab by mouth two (2) times a day.  Lawrence Thomas NP 12/24/2017 am Active Yes    cetirizine (ZYRTEC) 10 mg tablet Take 1 Tab by mouth daily. Theresa Salazar NP 12/24/2017 am Active Yes    diazepam (VALIUM) 2 mg tablet Take 1 tablet by mouth as needed for Anxiety. Take 1 hr prior to procedure, may repeat 15 min before procedure as well Theresa Salazar NP  Active Yes    divalproex ER (DEPAKOTE ER) 500 mg ER tablet Take 500 mg by mouth two (2) times a day. Historical Provider 12/24/2017 am Active Yes    drospirenone-ethinyl estradiol (ANAYA) 3-0.02 mg tab Take 1 Tab by mouth daily. Theresa Salazar NP 12/24/2017 am Active Yes    fluticasone (FLONASE) 50 mcg/actuation nasal spray 2 Sprays by Both Nostrils route daily. Historical Provider 12/24/2017 am Active Yes    hydrOXYzine (VISTARIL) 25 mg capsule Take 25 mg by mouth three (3) times daily as needed for Anxiety (aggression). Historical Provider  Active Yes    levothyroxine (SYNTHROID) 75 mcg tablet Take 1 po qam. Theresa Salazar NP 12/24/2017 am Active Yes    lithium carbonate CR (ESKALITH CR) 450 mg CR tablet Take 1 Tab by mouth two (2) times a day. Theresa Salazar NP 12/24/2017 am Active Yes    naproxen (NAPROSYN) 500 mg tablet Take 1 Tab by mouth two (2) times daily as needed. (with meals) Alden Singh NP  Active Yes    nitrofurantoin, macrocrystal-monohydrate, (MACROBID) 100 mg capsule Take 1 Cap by mouth two (2) times a day for 7 days. Coco Alaniz MD 12/24/2017 am Active Yes    risperiDONE (RISPERDAL) 2 mg tablet Take 2 mg by mouth daily. Historical Provider 12/24/2017 am Active Yes    topiramate (TOPAMAX) 200 mg tablet Take 200 mg by mouth nightly. Historical Provider 12/23/2017 pm Active Yes    traZODone (DESYREL) 100 mg tablet Take 100 mg by mouth nightly. Historical Provider 12/23/2017 pm Active Yes    zolpidem (AMBIEN) 5 mg tablet Take  by mouth nightly as needed for Sleep. Historical Provider 12/23/2017 pm Active Yes                  Thank you for the consult,  Jose G Ames, BCPS  [phone]

## 2017-12-25 LAB
ALBUMIN SERPL-MCNC: 2.4 G/DL (ref 3.5–5)
ALBUMIN/GLOB SERPL: 0.6 {RATIO} (ref 1.1–2.2)
ALP SERPL-CCNC: 74 U/L (ref 45–117)
ALT SERPL-CCNC: 15 U/L (ref 12–78)
ANION GAP SERPL CALC-SCNC: 10 MMOL/L (ref 5–15)
AST SERPL-CCNC: 10 U/L (ref 15–37)
BACTERIA SPEC CULT: NORMAL
BASOPHILS # BLD: 0 K/UL (ref 0–0.1)
BASOPHILS NFR BLD: 0 % (ref 0–1)
BILIRUB SERPL-MCNC: 0.2 MG/DL (ref 0.2–1)
BUN SERPL-MCNC: 34 MG/DL (ref 6–20)
BUN/CREAT SERPL: 13 (ref 12–20)
CALCIUM SERPL-MCNC: 9 MG/DL (ref 8.5–10.1)
CC UR VC: NORMAL
CHLORIDE SERPL-SCNC: 107 MMOL/L (ref 97–108)
CO2 SERPL-SCNC: 20 MMOL/L (ref 21–32)
CREAT SERPL-MCNC: 2.67 MG/DL (ref 0.55–1.02)
DATE LAST DOSE: ABNORMAL
DATE LAST DOSE: ABNORMAL
EOSINOPHIL # BLD: 0.5 K/UL (ref 0–0.4)
EOSINOPHIL NFR BLD: 4 % (ref 0–7)
ERYTHROCYTE [DISTWIDTH] IN BLOOD BY AUTOMATED COUNT: 12.6 % (ref 11.5–14.5)
FERRITIN SERPL-MCNC: 148 NG/ML (ref 8–252)
GLOBULIN SER CALC-MCNC: 4.3 G/DL (ref 2–4)
GLUCOSE SERPL-MCNC: 91 MG/DL (ref 65–100)
HCT VFR BLD AUTO: 29.7 % (ref 35–47)
HGB BLD-MCNC: 9.8 G/DL (ref 11.5–16)
LITHIUM SERPL-SCNC: 1.88 MMOL/L (ref 0.6–1.2)
LITHIUM SERPL-SCNC: 1.9 MMOL/L (ref 0.6–1.2)
LYMPHOCYTES # BLD: 1.4 K/UL (ref 0.8–3.5)
LYMPHOCYTES NFR BLD: 12 % (ref 12–49)
MCH RBC QN AUTO: 28.3 PG (ref 26–34)
MCHC RBC AUTO-ENTMCNC: 33 G/DL (ref 30–36.5)
MCV RBC AUTO: 85.8 FL (ref 80–99)
MONOCYTES # BLD: 0.7 K/UL (ref 0–1)
MONOCYTES NFR BLD: 6 % (ref 5–13)
NEUTS SEG # BLD: 9.8 K/UL (ref 1.8–8)
NEUTS SEG NFR BLD: 78 % (ref 32–75)
OSMOLALITY UR: 160 MOSM/KG H2O
PLATELET # BLD AUTO: 146 K/UL (ref 150–400)
POTASSIUM SERPL-SCNC: 3.6 MMOL/L (ref 3.5–5.1)
PROT SERPL-MCNC: 6.7 G/DL (ref 6.4–8.2)
RBC # BLD AUTO: 3.46 M/UL (ref 3.8–5.2)
REPORTED DOSE,DOSE: ABNORMAL UNITS
REPORTED DOSE,DOSE: ABNORMAL UNITS
REPORTED DOSE/TIME,TMG: ABNORMAL
REPORTED DOSE/TIME,TMG: ABNORMAL
SERVICE CMNT-IMP: NORMAL
SODIUM SERPL-SCNC: 137 MMOL/L (ref 136–145)
SODIUM UR-SCNC: 40 MMOL/L
VANCOMYCIN SERPL-MCNC: 12.6 UG/ML
WBC # BLD AUTO: 12.5 K/UL (ref 3.6–11)

## 2017-12-25 PROCEDURE — 74011250637 HC RX REV CODE- 250/637: Performed by: FAMILY MEDICINE

## 2017-12-25 PROCEDURE — 74011000258 HC RX REV CODE- 258: Performed by: FAMILY MEDICINE

## 2017-12-25 PROCEDURE — 80202 ASSAY OF VANCOMYCIN: CPT | Performed by: STUDENT IN AN ORGANIZED HEALTH CARE EDUCATION/TRAINING PROGRAM

## 2017-12-25 PROCEDURE — 85025 COMPLETE CBC W/AUTO DIFF WBC: CPT | Performed by: FAMILY MEDICINE

## 2017-12-25 PROCEDURE — 74011250636 HC RX REV CODE- 250/636: Performed by: FAMILY MEDICINE

## 2017-12-25 PROCEDURE — 80053 COMPREHEN METABOLIC PANEL: CPT | Performed by: FAMILY MEDICINE

## 2017-12-25 PROCEDURE — 65660000000 HC RM CCU STEPDOWN

## 2017-12-25 PROCEDURE — 80178 ASSAY OF LITHIUM: CPT

## 2017-12-25 PROCEDURE — 36415 COLL VENOUS BLD VENIPUNCTURE: CPT | Performed by: FAMILY MEDICINE

## 2017-12-25 RX ORDER — POLYETHYLENE GLYCOL 3350 17 G/17G
17 POWDER, FOR SOLUTION ORAL ONCE
Status: COMPLETED | OUTPATIENT
Start: 2017-12-25 | End: 2017-12-25

## 2017-12-25 RX ORDER — DOCUSATE SODIUM 100 MG/1
100 CAPSULE, LIQUID FILLED ORAL
Status: DISCONTINUED | OUTPATIENT
Start: 2017-12-25 | End: 2017-12-27 | Stop reason: HOSPADM

## 2017-12-25 RX ORDER — POLYETHYLENE GLYCOL 3350 17 G/17G
17 POWDER, FOR SOLUTION ORAL DAILY
Status: DISCONTINUED | OUTPATIENT
Start: 2017-12-26 | End: 2017-12-25

## 2017-12-25 RX ORDER — FACIAL-BODY WIPES
10 EACH TOPICAL DAILY PRN
Status: DISCONTINUED | OUTPATIENT
Start: 2017-12-25 | End: 2017-12-27 | Stop reason: HOSPADM

## 2017-12-25 RX ADMIN — LEVOTHYROXINE SODIUM 75 MCG: 75 TABLET ORAL at 09:25

## 2017-12-25 RX ADMIN — TOPIRAMATE 50 MG: 25 TABLET, FILM COATED ORAL at 21:07

## 2017-12-25 RX ADMIN — DIVALPROEX SODIUM 500 MG: 500 TABLET, EXTENDED RELEASE ORAL at 21:07

## 2017-12-25 RX ADMIN — POLYETHYLENE GLYCOL (3350) 17 G: 17 POWDER, FOR SOLUTION ORAL at 16:38

## 2017-12-25 RX ADMIN — VANCOMYCIN HYDROCHLORIDE 1250 MG: 10 INJECTION, POWDER, LYOPHILIZED, FOR SOLUTION INTRAVENOUS at 16:38

## 2017-12-25 RX ADMIN — RISPERIDONE 2 MG: 1 TABLET, FILM COATED ORAL at 09:24

## 2017-12-25 RX ADMIN — Medication 10 ML: at 21:08

## 2017-12-25 RX ADMIN — CEFEPIME HYDROCHLORIDE 2 G: 2 INJECTION, POWDER, FOR SOLUTION INTRAVENOUS at 09:24

## 2017-12-25 RX ADMIN — BENZTROPINE 1 MG: 1 TABLET ORAL at 21:07

## 2017-12-25 RX ADMIN — HEPARIN SODIUM 5000 UNITS: 5000 INJECTION, SOLUTION INTRAVENOUS; SUBCUTANEOUS at 06:34

## 2017-12-25 RX ADMIN — HEPARIN SODIUM 5000 UNITS: 5000 INJECTION, SOLUTION INTRAVENOUS; SUBCUTANEOUS at 16:30

## 2017-12-25 RX ADMIN — BENZTROPINE 1 MG: 1 TABLET ORAL at 09:25

## 2017-12-25 RX ADMIN — DIVALPROEX SODIUM 500 MG: 500 TABLET, EXTENDED RELEASE ORAL at 09:24

## 2017-12-25 RX ADMIN — HEPARIN SODIUM 5000 UNITS: 5000 INJECTION, SOLUTION INTRAVENOUS; SUBCUTANEOUS at 21:07

## 2017-12-25 RX ADMIN — Medication 10 ML: at 13:55

## 2017-12-25 RX ADMIN — Medication 10 ML: at 06:35

## 2017-12-25 NOTE — PROGRESS NOTES
Problem: Falls - Risk of  Goal: *Absence of Falls  Document Agatha Fall Risk and appropriate interventions in the flowsheet.    Outcome: Progressing Towards Goal  Fall Risk Interventions:  Mobility Interventions: Communicate number of staff needed for ambulation/transfer, Bed/chair exit alarm, Patient to call before getting OOB    Mentation Interventions: Adequate sleep, hydration, pain control, Bed/chair exit alarm, Door open when patient unattended, Family/sitter at bedside, Gait belt with transfers/ambulation, Reorient patient, Room close to nurse's station, Toileting rounds, Update white board    Medication Interventions: Bed/chair exit alarm, Patient to call before getting OOB, Teach patient to arise slowly    Elimination Interventions: Bed/chair exit alarm, Call light in reach, Patient to call for help with toileting needs

## 2017-12-25 NOTE — CONSULTS
[address]  MR#: [id number]  : 1985  ACCOUNT #: [de-identified]   DATE OF SERVICE: 2017    HISTORY OF PRESENT ILLNESS:  Asked by the [facility] practice house staff to see this patient in lieu of altered mental status. The patient is a pleasant 73-year-old  whose history is supplied by the mother in part, and existing records. 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 Patient could not provide useful history due to her baseline status and her current lethargy. Apparently lives in a group home and this is off of [address]. Normally is able to dress herself, feed herself, take her medications, and has some verbal capabilities, although somewhat limited. She apparently since August has had a history of urinary incontinence. Was treated not too long ago for a first UTI, which seemed to clear up, and then more recently, this past Tuesday, went back to the doctor and was put on Macrobid. Since that time, the representative from the group says she has had some gradual deterioration in her function. Could not bathe herself, very lethargic, no manifest fever, and no cough or other complaints. Last seizure was a year ago, described as the patient cutting her eyes to one side, a chewing motion, drooling, and she starts having tremulous activity in her hand. This usually stays until she is taken to the ER where it is broken with Ativan or something similar, and that hospital encounter a year ago was at [facility]. The mother thinks she may be followed by [facility], and perhaps Dr. Jose Mccoy, although she is not sure?       MEDICATIONS:  By the [facility], she is on Depakote extended release 500 mg twice a day, and that is her seizure medicine. She is otherwise on Risperdal, Topamax 200 mg nightly, which apparently is being managed by Psychiatry. Also on Flonase, recently prescribed Macrobid 200 mg that was supposed to take place for 7 days, Zyrtec, Synthroid, Naprosyn, Vistaril, Ambien, Desyrel, Cogentin, lithium carbonate, Valium. ALLERGIES:  TO PHENOBARBITAL, ALTHOUGH IT DOES NOT SOUND LIKE A TRUE ALLERGY. THE MOTHER THINKS THAT ONE TIME SHE WAS GIVEN TOO MUCH AND SHE BECAME TOO SEDATED. PAST MEDICAL HISTORY:  As noted. SOCIAL HISTORY:  Patient has very limited history to offer at this point, and she is shivering and feels cold. Never smoker. Alcohol consumption, none. REVIEW OF SYSTEMS:  Currently negative, although she does complain of the environment feeling cold, and she is on several layers of linens to keep her warm. DATA:  Testing since here shows white count 15.1, depressed hemoglobin and hematocrit, increased neutrophils and immature granulocytes. The urinalysis reveals cloudy, proteinuria, moderate blood, large leukocyte esterase, 10-20 whites, 0-5 reds, bacteria currently negative, amorphous crystals. The chemistries show BUN 35, creatinine 3.25. She had a BUN and creatinine back in January 2016 of 21.41, for what it is worth. By way of comparisons, her valproic acid level total was 46 in February of this year , lithium level was 1.5. A drug screen was currently negative. In terms of microbiological data, she has blood and urine culture that are incubating. Chest x-ray portable shows no acute CT process. EKG:  Poor quality. Normal sinus rhythm. No previous EKGs for comparison. FAMILY HISTORY:  Noncontributory.     PHYSICAL EXAMINATION   GENERAL:  Pleasant-appearing young white female, shivering, and very much alert, can correspond and interact in a limited fashion in terms of conversation, but is able to follow 1- and occasional 2-step commands. VITAL SIGNS:  Temperature 97.4, pulse 80, blood pressure 107/77, respirations 22, pulse ox 96% on room air. Currently oriented to her name. HEENT:  Normocephalic, atraumatic. No bruits. Ears, nose and throat were clear. NECK:  Reasonably supple. No lymphadenopathy. Carotid upstroke, nontender, no bruits. Range of motion complete. CHEST:  Clear. No rales or wheezes. CARDIOVASCULAR:  Currently a regular rate and rhythm without gallops, murmurs or rubs. Pulses 2+ and full. ABDOMEN:  Rounded, nontender. EXTREMITIES:  Full range of motion, without cyanosis, clubbing, edema, rash or birthmarks. Again, pretty much total body shivering at this point. NEUROLOGIC:  Cranial nerves II-XII reveal funduscopic showing a brief glimpse of the optic nerve heads, but nothing more based on patient's ability to cooperate. Pupils themselves appear to be equally round. Extraocular motility intact, without gross nystagmus. Facial animation preserved. Face sensibility? Oral exam appears to be moist at this time, tongue midline. Voice is somewhat hushed. Cerebellar testing:  Finger-nose-finger, toe-to-finger not currently enlistable due to patient's lethargy, etc.  Motor:  Currently mobilizing all 4 extremities equally well. Sensibility below the chin:  Patient responded to the vibration and the cold metal of the tuning fork appropriately. Reflexes were approaching +2 in all 4 extremities. The toes were downgoing. There was no clonus, and Arthur, gait, etc. Deferred. Short of generalized shivering, no involuntary movements seen. IMPRESSION:  Altered mental status. Currently I am on the same page with the family practice group in terms of immediate concerns to this, including dehydration, and with the patient's recent start up on the Bactrim, the relative state of dehydration and kidney function could promote drug toxicity. Partially treated UTI too?   She is on several medications that could aid and enhance lethargy under these kind of circumstances beyond the Depakote. With her static encephalopathy, she has a lower threshold to cognitive depression and neuronal reserve under these and similar circumstances. Has an appropriate Depakote, Topamax, and lithium level that are being analyzed. I do not think she is in the throes of a seizure, but certainly makes sense to put her on neuro checks and seizure precautions, and will continue her medicine. If she cannot take p.o., then Depakote can be converted to IV Depacon and what other support medicine that she needs. I consider currently her Topamax something that is driven by Psychiatry for control of her mood and behavior disorder with bipolar. Placed on fall protocol. We can get an EEG if it looks like there is some sudden or acute change in her capacity. I will keep a close eye on her, and Dr. Jose Alfredo Quach will be taking over tomorrow. At this time, the question is does she have a partially treated UTI, and Family Practice at this time is going to start vancomycin and cefepime with watching for potential source of sepsis in the meantime. Again, blood and urine cultures underway. Will watch and may have further suggestions as the workup evolves. Thanks for a chance to see this nice lady.       Karma Glaser MD       [facility] / [facility]  D: 12/24/2017 14:07     T: 12/24/2017 19:09  JOB #: [id number]

## 2017-12-25 NOTE — PROGRESS NOTES
[address] [address] Pharmacy Dosing Services: Antimicrobial Stewardship Progress Note     Consult for antibiotic dosing of vancomycin by Dr. Belkis Tomlinson  Pharmacist reviewed antibiotic appropriateness for 33year old female  for indication of UTI with leukocytosis & AMS  Day of Therapy 2    Vancomycin therapy:  Random level drawn @ 1337 on 12/25 = 12.6 mcg/mL (Drawn ~ 24 hours after last dose at 1349 on 12/24) - subtherapeutic  Sepsis 2/2 UTI  Cr improving; ordered x 1 dose of 1250 mg now  Level not ordered; please order tomorrow ~ 1700 or later based on when dose is administered today    Non-Kinetic Antimicrobial Dosing:   Recommendation: Continue Cefepime 2 gm IV q24hr    Other Antimicrobial  (not dosed by pharmacist) none   Cultures    12/24 Blood - (pending)  12/24 Urine - (pending)   Serum Creatinine          Lab Results   Component Value Date/Time     Creatinine 2.67 12/25/2017 02:47 AM       Creatinine Clearance Estimated Creatinine Clearance: 28.5 mL/min (based on Cr of 2.67).    Temp    98.6 °F (37 °C)    WBC          Lab Results   Component Value Date/Time     WBC 12.5 12/25/2017 02:47 AM       H/H       Lab Results   Component Value Date/Time     HGB 9.8 12/25/2017 02:47 AM          Platelets       Lab Results   Component Value Date/Time     PLATELET 201 51/06/3374 02:47 AM          Thank you,  Patricia Montoya, PharmD     Contact: [phone]

## 2017-12-25 NOTE — PROGRESS NOTES
Lithium 1.9, notified family Practice, no further orders. Pt is sensitive to called, for any care she gets she complains. Blood draw, vitals are not easy to obtain. Family took off one side of the seizure pads, mom said she knows her daughter won't have one. Bedside and Verbal shift change report given to RN (oncoming nurse) by Yayo Gao (offgoing nurse). Report included the following information SBAR, Kardex and MAR.

## 2017-12-25 NOTE — PROGRESS NOTES
Primary Nurse Shweta Bell and Gayle Snow, [facility] performed a dual skin assessment on this patient No impairment noted  Juan score is 20

## 2017-12-25 NOTE — PROGRESS NOTES
Patient noted to drink excessive amounts of liquids in one sitting. Mother and sister at bedside and are refilling her water pitcher and allowing her to drink the entire contents of the pitcher. Instructed them to only provide the patient with a cup full of water at a time and not to refill it as frequently. Educated them on potential harm of over consumption of fluids, water in particular. Patient continues to be impulsive, unpredictable and at times aggressive. Currently sleeping bed with Mother and Sister at her bedside to assist with care and ambulation to bathroom. Patient is more cooperative with Mother and sisters assistance to redirect for medical procedure. Vital signs not obtained at noon due to patients restless behavior and inability to redirect her. Medical procedures consolidated and administered when patient is awake to aid in her obtaining adequate rest and to increase potential success with procedures and medication adminstrations.

## 2017-12-25 NOTE — PROGRESS NOTES
[facility] Pharmacy Dosing Services: Antimicrobial Stewardship Progress Note    Consult for antibiotic dosing of Vancomycin by Dr. Savita Hooker, Cefepime changed per protocol. Pharmacist reviewed antibiotic appropriateness for 33year old , female  for indication of leukocytosis, AMS, UTI  Day of Therapy 2    Plan:  Vancomycin therapy:  Start Vancomycin therapy, with loading dose of 1000 (mg) at 1350 12/24/17. Follow with maintenance dose of : by random level. Dose calculated to approximate a therapeutic trough of 15-20 mcg/mL. Last trough level / Plan for level:  12/25/17 at 1200  Pharmacy to follow daily and will make changes to dose and/or frequency based on clinical status. Non-Kinetic Antimicrobial Dosing:   Current Regimen:  Cefepime 2 grams IV q24H, per renal adjustment protocol. Recommendation:   Other Antimicrobial  (not dosed by pharmacist)   cefepime   Cultures        Serum Creatinine     Lab Results   Component Value Date/Time    Creatinine 2.67 12/25/2017 02:47 AM       Creatinine Clearance Estimated Creatinine Clearance: 28.5 mL/min (based on Cr of 2.67).      Temp   97.3 °F (36.3 °C)    WBC   Lab Results   Component Value Date/Time    WBC 12.5 12/25/2017 02:47 AM       H/H   Lab Results   Component Value Date/Time    HGB 9.8 12/25/2017 02:47 AM        Platelets   Lab Results   Component Value Date/Time    PLATELET 797 13/83/9875 02:47 AM          Pharmacist: Signed Angelica Avelar Contact information: [phone]

## 2017-12-25 NOTE — PROGRESS NOTES
[facility] MEDICINE RESIDENCY PROGRAM  PROGRESS NOTE     12/25/2017  PCP: Kareem Mata NP     Assessment/Plan:   Darnell Tan is a 33 y.o. female with known Bipolar affective disorder, MR, h/o partial/focal seizure, psychosis and hypothyroidism who is admitted for AMS, AGUS and sepsis 2/2 UTI.     Sepsis 2/2 UTI with 2/4 SIRS criteria- with WBC 15.1, RR 25. H/o frequent urinary incontinence and diagnosed with UTI on 12/20 which was treated with Macrobid. UA POA was cloudy with moderate blood, large leukocyte esterase, WBC 10-20 and few amorphous crystals. Pt is s/p 2 L bolus. LA 0.9-->1.0. Improved WBC 12.5.  - Continue Vancomycin and Cefepime   - Blood and Urine cultures  - MIVF  - Daily CBC, BMP     Leukocytosis- WBC POA 15.1--> 12.5. UA with signs of infection. Chest xray showed no acute process. Rapid influenza negative. - Abx started. See above   - Blood and urine culture     AGUS- Cr POA 3.25 (baseline approximately 1.5), GFR 17 (baseline approximately 47). 2/2 dehydration vs. Polypharmacy. Received IVF and seems to be hydrated on PE. Cr today is 2.67. CK 21 (L). FeNA 2.63%. Renal ultrasound without acute findings. - MIVF  - Strict Is&Os  - Avoid nephrotoxic agentss  - Continue to monitor with daily CMP  - If no improvement or there's worsening kidney function, consider Nephrology consult      AMS- could be 2/2 dehydration vs. UTI vs. Polypharmacy vs. Seizure? UDS negative. Initial Lithium was 2.78--> 1.9. Per pharmacy lithium level needs to be rechecked. - F/u Lithium, Valproic acid and Topiramate level  - Seizure and fall precautions   - Consulted Neurology, appreciate recommendations. EEG PRN if concerned pt having seizures.     Prolong QTc- QTc   - Avoid QTc prolonging medications     H/o Seizures- AMS could be 2/2 seizures. - Continue home medication of Depakote 500 mg BID, Cogentin 1 mg BID and Topamax 200 mg QHS  - Seizure precautions     Bipolar affective disorder- stable.  Pt followed OP by Dr. Marily Davenport  - Continue home medication of Risperidone 2 mg daily  - F/u lithium level. Hold Lithium 450 mg BID      Hypothyroidism- Last TSH in 2017 was 1.010  - Continue home medications of Synthroid 75 mcg daily      Constipation- mom reports that patient has a h/o constipation and has taken Colace in the past  - Colace 100 mg daily PRN     FEN/GI - Regular diet. Activity - Bedrest with allowed activities   DVT prophylaxis - Sub Q Heparin  GI prophylaxis - Not indicated at this time  Fall prophylaxis - Fall precautions ordered. Disposition - Plan to d/c to Group home. Consulting PT/OT  Code Status - Full, discussed with patient / caregivers. Group home charge nurse: [phone]        Subjective:     Pt was seen and examined at bedside. No acute events overnight. According to mother, she has improved and seems to be more hydrated. Allergies: Allergies   Allergen Reactions    Phenobarbital Unknown (comments)     Unable to obtain       Objective:   Physical examination  Visit Vitals    BP 92/59 (BP 1 Location: Right arm, BP Patient Position: At rest)    Pulse 65    Temp 98.6 °F (37 °C)    Resp 17    Ht 5' 4\" (1.626 m)    Wt 145 lb 8.1 oz (66 kg)    LMP  (LMP Unknown)    SpO2 96%    BMI 24.98 kg/m2      Temp (24hrs), Av.3 °F (36.8 °C), Min:97.3 °F (36.3 °C), Max:98.9 °F (37.2 °C)         O2 Device: Room air      Intake/Output Summary (Last 24 hours) at 17 1319  Last data filed at 17 0512   Gross per 24 hour   Intake             1875 ml   Output             1500 ml   Net              375 ml     Last shift:       Last 3 shifts:     1901 -  0700  In: 5277 [P.O.:1800; I.V.:75]  Out: 1500 [Urine:1500]    General Appearance:  Comfortable and in no acute distress. HEENT: Oral mucosa moist  Lungs:  Normal respiratory rate and normal effort. Breath sounds clear to auscultation. Heart: Normal rate. Regular rhythm.   S1 normal and S2 normal.    Extremities: Normal range of motion. There is no dependent edema. Neurological: Sleeping. Open her eyes when calling her name. Skin:  Warm and dry. Abdomen: Abdomen is soft. Bowel sounds are normal.   There is no abdominal tenderness. Pulses: Distal pulses are intact. Data Review:     Recent Labs      12/25/17   0247  12/24/17   1023   WBC  12.5*  15.1*   HGB  9.8*  10.9*   HCT  29.7*  32.7*   PLT  146*  163     Recent Labs      12/25/17   0247  12/24/17   1435  12/24/17   1023   NA  137  142  142   K  3.6  3.6  3.6   CL  107  111*  108   CO2  20*  22  24   GLU  91  101*  97   BUN  34*  35*  35*   CREA  2.67*  2.91*  3.25*   CA  9.0  8.4*  9.2   ALB  2.4*   --    --    SGOT  10*   --    --    ALT  15   --    --      Medications reviewed  Current Facility-Administered Medications   Medication Dose Route Frequency    Vancomycin: pharmacy dosing by random level   Other Rx Dosing/Monitoring    Vancomycin: draw random level at 1200 12/25/17   Other ONCE    cefepime (MAXIPIME) 2 g in 0.9% sodium chloride (MBP/ADV) 100 mL  2 g IntraVENous Q24H    sodium chloride (NS) flush 5-10 mL  5-10 mL IntraVENous Q8H    sodium chloride (NS) flush 5-10 mL  5-10 mL IntraVENous PRN    heparin (porcine) injection 5,000 Units  5,000 Units SubCUTAneous Q8H    benztropine (COGENTIN) tablet 1 mg  1 mg Oral BID    divalproex ER (DEPAKOTE ER) 24 hour tablet 500 mg  500 mg Oral BID    levothyroxine (SYNTHROID) tablet 75 mcg  75 mcg Oral ACB    risperiDONE (RisperDAL) tablet 2 mg  2 mg Oral DAILY    docusate sodium (COLACE) capsule 100 mg  100 mg Oral DAILY PRN    topiramate (TOPAMAX) tablet 50 mg  50 mg Oral QHS     Signed:    Autumn Joy DO   Resident, Family Medicine

## 2017-12-26 LAB
ALBUMIN SERPL-MCNC: 2.6 G/DL (ref 3.5–5)
ALBUMIN/GLOB SERPL: 0.6 {RATIO} (ref 1.1–2.2)
ALP SERPL-CCNC: 75 U/L (ref 45–117)
ALT SERPL-CCNC: 15 U/L (ref 12–78)
ANION GAP SERPL CALC-SCNC: 11 MMOL/L (ref 5–15)
AST SERPL-CCNC: 6 U/L (ref 15–37)
ATRIAL RATE: 84 BPM
BASOPHILS # BLD: 0.1 K/UL (ref 0–0.1)
BASOPHILS NFR BLD: 1 % (ref 0–1)
BILIRUB SERPL-MCNC: 0.2 MG/DL (ref 0.2–1)
BUN SERPL-MCNC: 31 MG/DL (ref 6–20)
BUN/CREAT SERPL: 14 (ref 12–20)
CALCIUM SERPL-MCNC: 9.9 MG/DL (ref 8.5–10.1)
CALCULATED P AXIS, ECG09: 39 DEGREES
CALCULATED R AXIS, ECG10: 18 DEGREES
CALCULATED T AXIS, ECG11: 55 DEGREES
CHLORIDE SERPL-SCNC: 110 MMOL/L (ref 97–108)
CO2 SERPL-SCNC: 20 MMOL/L (ref 21–32)
CREAT SERPL-MCNC: 2.25 MG/DL (ref 0.55–1.02)
DATE LAST DOSE: NORMAL
DIAGNOSIS, 93000: NORMAL
EOSINOPHIL # BLD: 0.5 K/UL (ref 0–0.4)
EOSINOPHIL NFR BLD: 6 % (ref 0–7)
ERYTHROCYTE [DISTWIDTH] IN BLOOD BY AUTOMATED COUNT: 12.8 % (ref 11.5–14.5)
GLOBULIN SER CALC-MCNC: 4.6 G/DL (ref 2–4)
GLUCOSE SERPL-MCNC: 90 MG/DL (ref 65–100)
HCT VFR BLD AUTO: 31.4 % (ref 35–47)
HGB BLD-MCNC: 10.3 G/DL (ref 11.5–16)
LITHIUM SERPL-SCNC: 1.09 MMOL/L (ref 0.6–1.2)
LYMPHOCYTES # BLD: 2.1 K/UL (ref 0.8–3.5)
LYMPHOCYTES NFR BLD: 28 % (ref 12–49)
MCH RBC QN AUTO: 28.1 PG (ref 26–34)
MCHC RBC AUTO-ENTMCNC: 32.8 G/DL (ref 30–36.5)
MCV RBC AUTO: 85.8 FL (ref 80–99)
MONOCYTES # BLD: 0.5 K/UL (ref 0–1)
MONOCYTES NFR BLD: 7 % (ref 5–13)
NEUTS SEG # BLD: 4.3 K/UL (ref 1.8–8)
NEUTS SEG NFR BLD: 58 % (ref 32–75)
P-R INTERVAL, ECG05: 138 MS
PLATELET # BLD AUTO: 170 K/UL (ref 150–400)
POTASSIUM SERPL-SCNC: 3.7 MMOL/L (ref 3.5–5.1)
PROT SERPL-MCNC: 7.2 G/DL (ref 6.4–8.2)
Q-T INTERVAL, ECG07: 392 MS
QRS DURATION, ECG06: 80 MS
QTC CALCULATION (BEZET), ECG08: 463 MS
RBC # BLD AUTO: 3.66 M/UL (ref 3.8–5.2)
REPORTED DOSE,DOSE: NORMAL UNITS
REPORTED DOSE/TIME,TMG: NORMAL
SODIUM SERPL-SCNC: 141 MMOL/L (ref 136–145)
VENTRICULAR RATE, ECG03: 84 BPM
WBC # BLD AUTO: 7.4 K/UL (ref 3.6–11)

## 2017-12-26 PROCEDURE — 97161 PT EVAL LOW COMPLEX 20 MIN: CPT

## 2017-12-26 PROCEDURE — 65660000000 HC RM CCU STEPDOWN

## 2017-12-26 PROCEDURE — 97116 GAIT TRAINING THERAPY: CPT

## 2017-12-26 PROCEDURE — 80178 ASSAY OF LITHIUM: CPT | Performed by: FAMILY MEDICINE

## 2017-12-26 PROCEDURE — 36415 COLL VENOUS BLD VENIPUNCTURE: CPT | Performed by: FAMILY MEDICINE

## 2017-12-26 PROCEDURE — 74011250637 HC RX REV CODE- 250/637: Performed by: FAMILY MEDICINE

## 2017-12-26 PROCEDURE — 80053 COMPREHEN METABOLIC PANEL: CPT | Performed by: FAMILY MEDICINE

## 2017-12-26 PROCEDURE — 74011250636 HC RX REV CODE- 250/636: Performed by: FAMILY MEDICINE

## 2017-12-26 PROCEDURE — 97165 OT EVAL LOW COMPLEX 30 MIN: CPT

## 2017-12-26 PROCEDURE — 85025 COMPLETE CBC W/AUTO DIFF WBC: CPT | Performed by: FAMILY MEDICINE

## 2017-12-26 RX ORDER — ACETAMINOPHEN 325 MG/1
325 TABLET ORAL
Status: DISCONTINUED | OUTPATIENT
Start: 2017-12-26 | End: 2017-12-27 | Stop reason: HOSPADM

## 2017-12-26 RX ADMIN — RISPERIDONE 2 MG: 1 TABLET, FILM COATED ORAL at 09:38

## 2017-12-26 RX ADMIN — BENZTROPINE 1 MG: 1 TABLET ORAL at 09:38

## 2017-12-26 RX ADMIN — HEPARIN SODIUM 5000 UNITS: 5000 INJECTION, SOLUTION INTRAVENOUS; SUBCUTANEOUS at 06:58

## 2017-12-26 RX ADMIN — BENZTROPINE 1 MG: 1 TABLET ORAL at 21:03

## 2017-12-26 RX ADMIN — TOPIRAMATE 50 MG: 25 TABLET, FILM COATED ORAL at 21:03

## 2017-12-26 RX ADMIN — HEPARIN SODIUM 5000 UNITS: 5000 INJECTION, SOLUTION INTRAVENOUS; SUBCUTANEOUS at 21:03

## 2017-12-26 RX ADMIN — DIVALPROEX SODIUM 500 MG: 500 TABLET, EXTENDED RELEASE ORAL at 09:38

## 2017-12-26 RX ADMIN — LEVOTHYROXINE SODIUM 75 MCG: 75 TABLET ORAL at 09:38

## 2017-12-26 RX ADMIN — DIVALPROEX SODIUM 500 MG: 500 TABLET, EXTENDED RELEASE ORAL at 21:03

## 2017-12-26 RX ADMIN — Medication 10 ML: at 21:07

## 2017-12-26 RX ADMIN — Medication 10 ML: at 06:58

## 2017-12-26 NOTE — PROGRESS NOTES
Occupational Therapy EVALUATION/discharge  Patient: Ayla Murcia (89 y.o. female)  Date: 12/26/2017  Primary Diagnosis: UTI (urinary tract infection)  Altered mental status  Acute kidney injury ([facility]        Precautions:  Fall, Seizure, Aspiration    ASSESSMENT:   Cleared by RN to see pt for therapy session. Based on the objective data described below, the patient presents near functional baseline, with mildly decreased endurance and strength following admission for UTI. Pt's PMH significant for cognitive deficits and bipolar disorder. She lives at a group home where she has assistance from staff for ADLs as needed, previously I with functional mobility. Pt's mother also very involved in pt's care and present for session, reports that pt usually able to perform ADLs independently apart from cueing, but had been increasingly weak over the last few weeks due to UTI. Today pt's mother reports she is near baseline and with symptoms much improved since admission. Pt performed functional transfers with modified independence-independence, required no physical assistance but occasional verbal cues. Pt performed seated toilet hygiene and standing grooming ADLs with supervision/setup (providing needed materials, verbal cues). Pt also performed LB dressing ADL with modified independence in long sitting. UE tremor noted which per mother is due to long-term medication use. Overall pt is near her functional baseline and further skilled acute occupational therapy is not indicated at this time. Recommend return to group home with assistance as needed at discharge. Discharge Recommendations: None  Further Equipment Recommendations for Discharge: none      SUBJECTIVE:   Patient stated Ok.     OBJECTIVE DATA SUMMARY:   HISTORY:   Past Medical History:   Diagnosis Date    Bipolar affective ([facility] [facility] 11/8/2010    MR (mental retardation), severe     Seizures ([facility] [facility]    History reviewed. No pertinent surgical history.     Prior Level of Function/Environment/Context: Pt's PMH significant for cognitive deficits and bipolar disorder. She lives at a group home where she has assistance from staff for ADLs as needed, previously I with functional mobility. Pt's mother also very involved in pt's care and present for session, reports that pt usually able to perform ADLs independently apart from cueing, but had been increasingly weak over the last few weeks due to UTI. Home Situation  Home Environment: Group home  # Steps to Enter: 5  One/Two Story Residence: Two story  # of Interior Steps: 12  Living Alone: No  Support Systems: Parent (group home staff)  Patient Expects to be Discharged to[de-identified] Group home  Tub or Shower Type: Tub/Shower combination  [x]  Right hand dominant   []  Left hand dominant    EXAMINATION OF PERFORMANCE DEFICITS:  Cognitive/Behavioral Status:  Neurologic State: Alert  Orientation Level: Oriented to person;Disoriented to time;Disoriented to situation;Disoriented to place  Cognition: Follows commands;Decreased attention/concentration  Perception: Appears intact  Perseveration: No perseveration noted  Safety/Judgement: Fall prevention;Home safety;Decreased awareness of environment    Hearing: Auditory  Auditory Impairment: None    Vision/Perceptual:              Acuity:  (unable to assess)         Range of Motion:  AROM: Within functional limits  PROM: Within functional limits    Strength:  Strength: Within functional limits     Coordination:  Coordination: Within functional limits         Tone & Sensation:  Tone: Normal  Sensation: Intact       Balance:  Sitting: Intact  Standing: Intact; Without support    Functional Mobility and Transfers for ADLs:  Bed Mobility:  Rolling: Independent  Supine to Sit: Independent  Sit to Supine: Independent  Scooting: Independent    Transfers:  Sit to Stand: Independent  Stand to Sit: Independent  Bed to Chair: Independent  Toilet Transfer : Modified independent    ADL Assessment:  Feeding: Independent    Oral Facial Hygiene/Grooming: Setup    Bathing: Supervision    Upper Body Dressing: Supervision    Lower Body Dressing: Supervision    Toileting: Supervision          ADL Intervention and task modifications:       Grooming  Grooming Assistance: Supervision/set up (in standing)      Lower Body Dressing Assistance  Socks: Modified independent (long sitting)    Toileting  Bladder Hygiene: Supervision/set-up    Cognitive Retraining  Safety/Judgement: Fall prevention;Home safety;Decreased awareness of environment    Functional Measure:  Barthel Index:    Bathin  Bladder: 10  Bowels: 10  Groomin  Dressing: 10  Feeding: 10  Mobility: 10  Stairs: 5  Toilet Use: 10  Transfer (Bed to Chair and Back): 15  Total: 80       Barthel and G-code impairment scale:  Percentage of impairment CH  0% CI  1-19% CJ  20-39% CK  40-59% CL  60-79% CM  80-99% CN  100%   Barthel Score 0-100 100 99-80 79-60 59-40 20-39 1-19   0   Barthel Score 0-20 20 17-19 13-16 9-12 5-8 1-4 0      The Barthel ADL Index: Guidelines  1. The index should be used as a record of what a patient does, not as a record of what a patient could do. 2. The main aim is to establish degree of independence from any help, physical or verbal, however minor and for whatever reason. 3. The need for supervision renders the patient not independent. 4. A patient's performance should be established using the best available evidence. Asking the patient, friends/relatives and nurses are the usual sources, but direct observation and common sense are also important. However direct testing is not needed. 5. Usually the patient's performance over the preceding 24-48 hours is important, but occasionally longer periods will be relevant. 6. Middle categories imply that the patient supplies over 50 per cent of the effort. 7. Use of aids to be independent is allowed. Gricelda Bellamy., Barthel, D.W. (4124). Functional evaluation: the Barthel Index.  [address] [facility] ([address], ELMERF, Opal Sultana., Julius Brewer., Glory Smalls. (1999). Measuring the change indisability after inpatient rehabilitation; comparison of the responsiveness of the Barthel Index and Functional [place] Measure. Journal of Neurology, Neurosurgery, and Psychiatry, 66(4), 750-282. EDY Ray, TRUDY Resendez, & Bijan Cardenas M.A. (2004.) Assessment of post-stroke quality of life in cost-effectiveness studies: The usefulness of the Barthel Index and the EuroQoL-5D. Quality of Life Research, 13, 827-17       G codes: In compliance with CMSs Claims Based Outcome Reporting, the following G-code set was chosen for this patient based on their primary functional limitation being treated: The outcome measure chosen to determine the severity of the functional limitation was the Barthel Index with a score of 80/100 which was correlated with the impairment scale. ? Self Care:     - CURRENT STATUS: CI - 1%-19% impaired, limited or restricted    - GOAL STATUS: CI - 1%-19% impaired, limited or restricted    - D/C STATUS:  CI - 1%-19% impaired, limited or restricted     Occupational Therapy Evaluation Charge Determination   History Examination Decision-Making   LOW Complexity : Brief history review  MEDIUM Complexity : 3-5 performance deficits relating to physical, cognitive , or psychosocial skils that result in activity limitations and / or participation restrictions MEDIUM Complexity : Patient may present with comorbidities that affect occupational performnce.  Miniml to moderate modification of tasks or assistance (eg, physical or verbal ) with assesment(s) is necessary to enable patient to complete evaluation       Based on the above components, the patient evaluation is determined to be of the following complexity level: LOW   Pain:  Pain Scale 1: Numeric (0 - 10)  Pain Intensity 1: 0              Activity Tolerance:   Good  Please refer to the flowsheet for vital signs taken during this treatment. After treatment:   []  Patient left in no apparent distress sitting up in chair  [x]  Patient left in no apparent distress in bed  [x]  Call bell left within reach  [x]  Nursing notified  [x]  Caregiver present  []  Bed alarm activated    COMMUNICATION/EDUCATION:   Communication/Collaboration:  [x]      Home safety education was provided and the patient/caregiver indicated understanding. [x]      Patient/family have participated as able and agree with findings and recommendations. []      Patient is unable to participate in plan of care at this time.   Findings and recommendations were discussed with: Physical Therapist and Registered Nurse    Sharon Armendariz OT  Time Calculation: 13 mins

## 2017-12-26 NOTE — PROGRESS NOTES
Problem: Falls - Risk of  Goal: *Absence of Falls  Document Agatha Fall Risk and appropriate interventions in the flowsheet.    Outcome: Progressing Towards Goal  Fall Risk Interventions:  Mobility Interventions: Bed/chair exit alarm, Communicate number of staff needed for ambulation/transfer, Patient to call before getting OOB    Mentation Interventions: Adequate sleep, hydration, pain control, Bed/chair exit alarm, Door open when patient unattended, Family/sitter at bedside, Gait belt with transfers/ambulation, More frequent rounding, Reorient patient, Room close to nurse's station, Update white board    Medication Interventions: Bed/chair exit alarm, Patient to call before getting OOB, Teach patient to arise slowly    Elimination Interventions: Bed/chair exit alarm, Call light in reach, Patient to call for help with toileting needs, Toilet paper/wipes in reach

## 2017-12-26 NOTE — PROGRESS NOTES
physical Therapy EVALUATION/DISCHARGE  Patient: Lauri Martin (64 y.o. female)  Date: 12/26/2017  Primary Diagnosis: UTI (urinary tract infection)  Altered mental status  Acute kidney injury ([facility] [address]        Precautions:   Fall, Seizure, Aspiration  ASSESSMENT :  Based on the objective data described above, the patient presents with independent with ambulation without assistive device and independent with all functional mobility. Patient lives in a group home with some supervision mother in the room and confirmed that patient back to her base line. Reviewed all safety precaution and home exercise program with the patient, verbalized understanding, clear to go home per Physical Therapy perspective. Skilled physical therapy is not indicated at this time. PLAN :  Discharge Recommendations: back to group home  Further Equipment Recommendations for Discharge: none     SUBJECTIVE:   Patient stated ok.     OBJECTIVE DATA SUMMARY:   HISTORY:    Past Medical History:   Diagnosis Date    Bipolar affective ([facility] [address] 11/8/2010    MR (mental retardation), severe     [facility]    History reviewed. No pertinent surgical history. Prior Level of Function/Home Situation: lives with supervision in a group home  Personal factors and/or comorbidities impacting plan of care:     Home Situation  Home Environment: Group home  # Steps to Enter: 5  One/Two Story Residence: Two story  # of Interior Steps: 12  Living Alone: No  Support Systems: Parent (group home staff)  Patient Expects to be Discharged to[de-identified] Group home  Tub or Shower Type: Tub/Shower combination    EXAMINATION/PRESENTATION/DECISION MAKING:   Critical Behavior:  Neurologic State: Alert  Orientation Level: Oriented to person, Disoriented to time, Disoriented to situation, Disoriented to place  Cognition: Follows commands, Decreased attention/concentration  Safety/Judgement: Fall prevention, Home safety, Decreased awareness of environment  Hearing:   Auditory  Auditory Impairment: None    Range Of Motion:  AROM: Within functional limits           PROM: Within functional limits           Strength:    Strength: Within functional limits                    Tone & Sensation:                                  Coordination:  Coordination: Within functional limits  Vision:   Acuity:  (unable to assess)  Functional Mobility:  Bed Mobility:  Rolling: Independent  Supine to Sit: Independent  Sit to Supine: Independent  Scooting: Independent  Transfers:  Sit to Stand: Independent  Stand to Sit: Independent  Stand Pivot Transfers: Independent     Bed to Chair: Independent              Balance:   Sitting: Intact  Standing: Intact; Without support  Ambulation/Gait Training:  Distance (ft): 300 Feet (ft)     Ambulation - Level of Assistance: Supervision     Gait Description (WDL): Within defined limits                                          Therapeutic Exercises:    Instructed patient to continue active range of motion exercise on both legs while up on chair or on bed. Functional Measure:  Tinetti test:    Sitting Balance: 1  Arises: 2  Attempts to Rise: 2  Immediate Standing Balance: 2  Standing Balance: 2  Nudged: 2  Eyes Closed: 1  Turn 360 Degrees - Continuous/Discontinuous: 1  Turn 360 Degrees - Steady/Unsteady: 1  Sitting Down: 2  Balance Score: 16  Indication of Gait: 1  R Step Length/Height: 1  L Step Length/Height: 1  R Foot Clearance: 1  L Foot Clearance: 1  Step Symmetry: 1  Step Continuity: 1  Path: 2  Trunk: 2  Walking Time: 1  Gait Score: 12  Total Score: 28       Tinetti Test and G-code impairment scale:  Percentage of Impairment CH    0%   CI    1-19% CJ    20-39% CK    40-59% CL    60-79% CM    80-99% CN     100%   Tinetti  Score 0-28 28 23-27 17-22 12-16 6-11 1-5 0       Tinetti Tool Score Risk of Falls  <19 = High Fall Risk  19-24 = Moderate Fall Risk  25-28 = Low Fall Risk  Jakubetti ME. Performance-Oriented Assessment of Mobility Problems in Elderly Patients.  [address] 66; 21:951-078. (Scoring Description: PT Bulletin Feb. 10, 1993)    Older adults: Angeline Zamarripa et al, 2009; n = 1000 [facility] elderly evaluated with ABC, SIMIN, ADL, and IADL)  · Mean SIMIN score for males aged 69-68 years = 26.21(3.40)  · Mean SIMIN score for females age 69-68 years = 25.16(4.30)  · Mean SIMIN score for males over 80 years = 23.29(6.02)  · Mean SIMIN score for females over 80 years = 17.20(8.32)       G codes: In compliance with CMSs Claims Based Outcome Reporting, the following G-code set was chosen for this patient based on their primary functional limitation being treated: The outcome measure chosen to determine the severity of the functional limitation was the tinetti with a score of 28/28 which was correlated with the impairment scale. ? Mobility - Walking and Moving Around:     - CURRENT STATUS: CH - 0% impaired, limited or restricted    - GOAL STATUS: CH - 0% impaired, limited or restricted    - D/C STATUS:  CH - 0% impaired, limited or restricted        Physical Therapy Evaluation Charge Determination   History Examination Presentation Decision-Making   LOW Complexity : Zero comorbidities / personal factors that will impact the outcome / POC LOW Complexity : 1-2 Standardized tests and measures addressing body structure, function, activity limitation and / or participation in recreation  LOW Complexity : Stable, uncomplicated  Other outcome measures tinetti  LOW       Based on the above components, the patient evaluation is determined to be of the following complexity level: LOW     Pain:  Pain Scale 1: Numeric (0 - 10)  Pain Intensity 1: 0     Activity Tolerance:   Good. Please refer to the flowsheet for vital signs taken during this treatment.   After treatment:   []   Patient left in no apparent distress sitting up in chair  [x]   Patient left in no apparent distress in bed  [x]   Call bell left within reach  [x]   Nursing notified  [x]   Caregiver present  []   Bed alarm activated    COMMUNICATION/EDUCATION:   Communication/Collaboration:  [x]   Fall prevention education was provided and the patient/caregiver indicated understanding. [x]   Patient/family have participated as able and agree with findings and recommendations. []   Patient is unable to participate in plan of care at this time. Findings and recommendations were discussed with: Occupational Therapist, Registered Nurse and patient    Thank you for this referral.  Sondra Moreno, PT,WCC.    Time Calculation: 25 mins

## 2017-12-26 NOTE — CONSULTS
Juan Miller MD  [phone]                         IDENTIFICATION:    Patient Name  Erlin Ott   Date of Birth 1985   [facility] [id number]   Medical Record Number  [id number]      Age  33 y.o. PCP Shayy Moser NP   Admit date:  12/24/2017    Room Number  319/01  @ [address] [facility]   Date of Service  12/26/2017            HISTORY         REASON FOR CONSULTATION:     CC:  \"My back hurts\". HISTORY OF PRESENT ILLNESS:    The patient Erlin Ott is a 33 y.o.  female with a past psychiatric history of Bipolar affective disorder, MR, Paranoid Schizophrenia who presents to the ER complaining of fatigue, lethargy. History mainly provided by mom who is on bedside. Patient lives in a group home. Per mom the group home staff called her because pt couldn't bathe herself and was very lethargic. Mom reports that patient has been having urine incontinence since August and last Tuesday she was seen at PCP and diagnosed with UTI. Has hx of seizures since birth. Last seizure was 1 year ago and before that it was several years ago. Mom tells me she has been drinking too much water lately. Patient herself did not contribute much to history. At baseline pt is able to walk and can tae care of ADLs although she requires a lot of verbal prompting. Mom feels some of her abilities to take care of herself have been declining. 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 Has had hallucinations in the past. There is also history of verbal aggression but o physical aggression per mom. On lithium for 10 + years along with other meds. Lithium level on admission was 2.78. There is no history of suicidal ideation and no suicide attempts per mom although pt tells me she once tried jumping off a roof. Mom looks at her with surprise when pt states that. No clear history of manic symptoms per mom.  She follows up with Dr. Caden Taveras who is a psychiatrist.      Manuel Huynh:  Allergies Allergen Reactions    Phenobarbital Unknown (comments)     Unable to obtain      MEDICATIONS PRIOR TO ADMISSION:  Prescriptions Prior to Admission   Medication Sig    divalproex ER (DEPAKOTE ER) 500 mg ER tablet Take 500 mg by mouth two (2) times a day.  risperiDONE (RISPERDAL) 2 mg tablet Take 2 mg by mouth daily.  topiramate (TOPAMAX) 200 mg tablet Take 100 mg by mouth nightly.  fluticasone (FLONASE) 50 mcg/actuation nasal spray 2 Sprays by Both Nostrils route daily.  nitrofurantoin, macrocrystal-monohydrate, (MACROBID) 100 mg capsule Take 1 Cap by mouth two (2) times a day for 7 days.  cetirizine (ZYRTEC) 10 mg tablet Take 1 Tab by mouth daily.  levothyroxine (SYNTHROID) 75 mcg tablet Take 1 po qam.    drospirenone-ethinyl estradiol (ANAYA) 3-0.02 mg tab Take 1 Tab by mouth daily.  naproxen (NAPROSYN) 500 mg tablet Take 1 Tab by mouth two (2) times daily as needed. (with meals)    hydrOXYzine (VISTARIL) 25 mg capsule Take 25 mg by mouth three (3) times daily as needed for Anxiety (aggression).  zolpidem (AMBIEN) 5 mg tablet Take  by mouth nightly as needed for Sleep.  traZODone (DESYREL) 100 mg tablet Take 100 mg by mouth nightly.  benztropine (COGENTIN) 1 mg tablet Take 1 Tab by mouth two (2) times a day.  lithium carbonate CR (ESKALITH CR) 450 mg CR tablet Take 1 Tab by mouth two (2) times a day.  diazepam (VALIUM) 2 mg tablet Take 1 tablet by mouth as needed for Anxiety.  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 11/8/2010    MR (mental retardation), severe     Seizures ([facility]       Past Medical History:   Diagnosis Date    Bipolar affective ([facility] 11/8/2010    MR (mental retardation), severe     Seizures ([facility]    History reviewed. No pertinent surgical history. SOCIAL HISTORY:    Social History     Social History Narrative     Social History   Substance Use Topics    Smoking status: Never Smoker    Smokeless tobacco: Never Used    Alcohol use No      FAMILY HISTORY:  History reviewed. History reviewed. No pertinent family history. REVIEW of SYSTEMS:   As noted in history of present illness           [address] with psychomotor retardation. Oriented X person and place \"Hospital\". Cant tell month date, year. Cant name President. Cant name the hospital. Mood: ok. Affect: Silly. Slow speech. Denies SI/HI. No delusions. no hallucinations. Thought process illogical at times. Concentration limited. Insight/judgement Limited.              VITALS:     Patient Vitals for the past 24 hrs:   Temp Pulse Resp BP SpO2   12/26/17 0407 97.3 °F (36.3 °C) 82 17 121/88 100 %   12/25/17 2352 98.2 °F (36.8 °C) 80 16 122/76 100 %   12/25/17 2003 98.8 °F (37.1 °C) 98 17 105/80 94 %   12/25/17 1638 97.8 °F (36.6 °C) 74 16 98/69 98 %   12/25/17 0900 99.2 °F (37.3 °C) 74 16 106/72 96 %              DATA     Labs reviewed    MEDICATIONS       ALL MEDICATIONS  Current Facility-Administered Medications   Medication Dose Route Frequency    docusate sodium (COLACE) capsule 100 mg  100 mg Oral BID PRN    bisacodyl (DULCOLAX) suppository 10 mg  10 mg Rectal DAILY PRN    sodium chloride (NS) flush 5-10 mL  5-10 mL IntraVENous Q8H    sodium chloride (NS) flush 5-10 mL  5-10 mL IntraVENous PRN    heparin (porcine) injection 5,000 Units  5,000 Units SubCUTAneous Q8H    benztropine (COGENTIN) tablet 1 mg  1 mg Oral BID    divalproex ER (DEPAKOTE ER) 24 hour tablet 500 mg  500 mg Oral BID    levothyroxine (SYNTHROID) tablet 75 mcg  75 mcg Oral ACB    risperiDONE (RisperDAL) tablet 2 mg  2 mg Oral DAILY    topiramate (TOPAMAX) tablet 50 mg  50 mg Oral QHS      SCHEDULED MEDICATIONS  Current Facility-Administered Medications   Medication Dose Route Frequency    sodium chloride (NS) flush 5-10 mL  5-10 mL IntraVENous Q8H    heparin (porcine) injection 5,000 Units  5,000 Units SubCUTAneous Q8H    benztropine (COGENTIN) tablet 1 mg  1 mg Oral BID    divalproex ER (DEPAKOTE ER) 24 hour tablet 500 mg  500 mg Oral BID    levothyroxine (SYNTHROID) tablet 75 mcg  75 mcg Oral ACB    risperiDONE (RisperDAL) tablet 2 mg  2 mg Oral DAILY    topiramate (TOPAMAX) tablet 50 mg  50 mg Oral QHS                ASSESSMENT & PLAN        The patient Sarah Cook is a 33 y.o.  female who presents at this time for the following psychiatric diagnoses:    Schizophrenia and Bipolar d/o per history  Intellectual Disability    Plan:  1. Her lithium was at toxic levels at admission. I will hold off for now and avoid it in future. With hx of polyuria and increased water intake I suspect if she has Diabetes Insipidus from prolonged use of lithium. Consider nephrology consult. 2. Continue Risperdal, Topamax, Depakote and cogentin at current dose. Can be discharged to group home from psych stand point when medically stable  and can follow up as outpatient with Dr. Raymundo Maurer    Will follow patient's course along with you as necessary. Thank you for the opportunity to participate in the care of your patient.                         SIGNED:    Rob Tolbert MD  12/26/2017

## 2017-12-26 NOTE — PROGRESS NOTES
12  Notified Dr. Page Carlson that pt will not allow us to draw blood at this time. 1026  TRANSFER - OUT REPORT:    Verbal report given to [facility] Елена (name) on Nyasia Melton  being transferred to 5th floor (unit) for routine progression of care       Report consisted of patients Situation, Background, Assessment and   Recommendations(SBAR). Information from the following report(s) SBAR, Kardex, Intake/Output, MAR, Recent Results and Cardiac Rhythm NSR was reviewed with the receiving nurse. Lines:   Peripheral IV 12/24/17 Left Antecubital (Active)   Site Assessment Clean, dry, & intact 12/26/2017  9:38 AM   Phlebitis Assessment 0 12/26/2017  9:38 AM   Infiltration Assessment 0 12/26/2017  9:38 AM   Dressing Status Clean, dry, & intact 12/26/2017  9:38 AM   Dressing Type Tape;Transparent 12/26/2017  9:38 AM   Hub Color/Line Status Pink;Capped 12/26/2017  9:38 AM   Action Taken Open ports on tubing capped 12/26/2017  4:22 AM   Alcohol Cap Used Yes 12/26/2017  9:38 AM        Opportunity for questions and clarification was provided.       Patient transported with:   [facility] [employer]

## 2017-12-26 NOTE — PROGRESS NOTES
Pt is very cooperative if pt can help during care. 2306 Pt's mom took off tele box. RN assisted with. Pt refused Heparin SQ.    0408 No seizure activity during the night, both pads on the bed. Pt seat up sometimes during night, pt cooperated better tonight during her care. Bedside and Verbal shift change report given to [address] (oncoming nurse) by Gerardo Murillo RN (offgoing nurse). Report included the following information SBAR, Kardex and MAR.

## 2017-12-26 NOTE — CONSULTS
Adan Belle MD Physician Signed Neurology Consults Date of Service: 17 1909         []Hide copied text  []Norrisver for attribution information  [address]  MR#: [id number]  : 1985  ACCOUNT #: [de-identified]   DATE OF SERVICE: 2017          ____________________________________________________________________    *   Please refer to the consult performed, at above date and time.       AMAYA Perez  ~ Neurology NP ~

## 2017-12-26 NOTE — PROGRESS NOTES
Chel Milton: [address] [facility] Neurology  [address] [place] [address] Avenue  [phone]  AMAYA Laguerre    * Inpatient Progress Note *    NAME:  Ruby Dumont   :  1985  MRN:  [id number]  PCP:    Alivia Sage, NP  ______________________________________________________________________  *Labs, studies, notes and hospital records were reviewed. --LUDIVINA Fuchs  ______________________________________________________________________  Chart Review  · Per review of Dr. Valencia Maldonado consult, agree with his note  · Topiramate and Valproic levels have not resulted yet, will await these results  · No growth per UrCx or Blood cx yet  · Per labs, Creatinine looking  Better overall---hope this is trending with improved mentation    · Will check back on labs Wednesday and if labs have resulted will check on patient then    ____________________________________________________________________    Collaborating care team physicianDr.Epps    ____________________________________________________________________    AMAYA Laguerre-BC

## 2017-12-26 NOTE — ROUTINE PROCESS
TRANSFER - IN REPORT:    Verbal report received from Mariola(name) on Geovani Holm  being received from [facility](unit) for routine progression of care      Report consisted of patients Situation, Background, Assessment and   Recommendations(SBAR). Information from the following report(s) SBAR, Kardex, MAR, Recent Results and Cardiac Rhythm NSR was reviewed with the receiving nurse. Opportunity for questions and clarification was provided. Assessment completed upon patients arrival to unit and care assumed.        Primary Nurse Prince Law and Josue Ardon RN performed a dual skin assessment on this patient No impairment noted  Juan score is 20

## 2017-12-26 NOTE — CDMP QUERY
1.   Please render your opinion on the main cause(s) of the altered mental status. Is it more likely metabolic encephalopathy related to dehydration, UTI and possible polypharmacy, or more likely simple post-ictal effects of seizure? =>Metabolic encephalopathy in the setting of altered mental status, due to dehydration, UTI and possible polypharmacy being treated with seizure and fall precautions, neurology consult and increased monitoring of nursing care with assistance from family. =>AMS note to be post-ictal phase of seizure treated with increased monitoring of nursing care. =>Other Explanation of clinical findings  =>Unable to Determine (no explanation of clinical findings)    The medical record reflects the following clinical findings, treatment, and risk factors:    32 y/o female admitted for sepsis 2/2 UTI. She has a history of severe mental retardation and bipolar affective disorder. Family concerned for recent increase in weakness and lethargy. Patient able to do simple ADL's on baseline, now leaning forward and slumped, agitated and agressive at times. She is being treated with neuro consult, increased monitoring of nursing care with assistance from family and hydration. Please clarify and document your clinical opinion in the progress notes and discharge summary including the definitive and/or presumptive diagnosis, (suspected or probable), related to the above clinical findings. Please include clinical findings supporting your diagnosis. Thanks for your time.     Dwaine Delgado RN, BSN  [phone]

## 2017-12-26 NOTE — PROGRESS NOTES
[address] [place] [address]   Office [phone]  Fax [phone]          Assessment and Izzy Hensley is a 33 y.o. female with known Bipolar affective disorder, MR, h/o partial/focal seizure, psychosis and hypothyroidism admitted for AMS, AGUS and sepsis 2/2 UTI. Susanna Carias She has spent 2 night(s) in the hospital.    24 Hour Events:   - Pt refused SubQ heparin  - Has been drinking excessive amount of liquids in one sitting      AGUS- Cr POA 3.25 (baseline approximately 1.5), GFR 17 (baseline approximately 47). 2/2 dehydration vs. Polypharmacy. Received IVF and seems to be hydrated on PE. CK 21 (L). FeNA 2.63%. Renal ultrasound without acute findings. - Cr today 2.25  - Strict Is&Os  - Avoid nephrotoxic agentss  - Continue to monitor with daily CMP  - If no improvement or there's worsening kidney function, consider Nephrology consult     Sepsis 2/2 UTI with 2/4 SIRS criteria- improved. With WBC 15.1, RR 25. H/o frequent urinary incontinence and diagnosed with UTI on 12/20 which was treated with Macrobid. UA POA was cloudy with moderate blood, large leukocyte esterase, WBC 10-20 and few amorphous crystals. Pt is s/p 2 L bolus. LA 0.9-->1.0. Improved WBC 7.4.  - Final urine culture <1000 colonies, no growth x1 day  - BCx no growth x2 days  - Discontinued Vancomycin and Cefepime   - Daily CBC, BMP      Leukocytosis- resolved. WBC POA 15.1--> 7.4. UA with signs of infection but negative urine culture. Chest xray showed no acute process. Rapid influenza negative.       Metabolic encephalopathy- improved. In the setting of AMS due to dehydration, UTI and possible polypharmacy. UDS negative. Initial Lithium was 2.78--> 1.9. Per pharmacy lithium level needs to be rechecked. - F/u Lithium, Valproic acid and Topiramate level  - Seizure and fall precautions   - Neurology following, appreciate recommendations.  EEG PRN if concerned pt having seizures.      Prolong QTc- QTc   - Avoid QTc prolonging medications      H/o Seizures- AMS could be 2/2 seizures. - Continue home medication of Depakote 500 mg BID, Cogentin 1 mg BID and Topamax 200 mg QHS  - Seizure precautions      Bipolar affective disorder- stable. Pt followed OP by Dr. Jamison Bull  - Continue home medication of Risperidone 2 mg daily  - F/u lithium level. Hold Lithium 450 mg BID   - Psychiatry consulted, appreciate recommendations       Hypothyroidism- Last TSH in 2017 was 1.010  - Continue home medications of Synthroid 75 mcg daily       Constipation- mom reports that patient has a h/o constipation and has taken Colace in the past  - Colace 100 mg daily PRN        FEN/GI - Regular diet. Activity - Bedrest- Activity allowed per PT/OT  DVT prophylaxis - Sub Q Heparin  GI prophylaxis - Not indicated at this time  Fall prophylaxis - Fall precautions ordered. Disposition - Plan to d/c to Group home. Code Status - Full  Group home charge nurse: [phone]    I appreciate the opportunity to participate in the care of this patient,  Jose Armando Reed MD  Family Medicine Resident    Pt will be discussed with Dr. Funmilayo Miller ([address] [place] [address] attending physician)         Subjective / Objective     Subjective    Per mom patient has been complaining of back and stomach pain. No BM in last couple days. Pt says she's feeling fine. Temp (24hrs), Av.3 °F (36.8 °C), Min:97.3 °F (36.3 °C), Max:99.2 °F (37.3 °C)     Objective:  General Appearance:  General patient appearance: Shaking. Vital signs: (most recent): Blood pressure 121/88, pulse 82, temperature 97.3 °F (36.3 °C), resp. rate 17, height 5' 4\" (1.626 m), weight 153 lb 6.4 oz (69.6 kg), SpO2 100 %. Lungs:  Normal respiratory rate and normal effort. Breath sounds clear to auscultation. Heart: Normal rate. Regular rhythm. S1 normal and S2 normal.    Neurological: Patient is alert. Abdomen: Abdomen is soft. Bowel sounds are normal.   There is no abdominal tenderness.        Respiratory:   O2 Device: Room air     I/O:    Date 12/25/17 0700 - 12/26/17 0659 12/26/17 0700 - 12/27/17 0659   Shift [id number] 1900-0659 24 Hour Total 7017-2626 9440-6433 24 Hour Total   I  N  T  A  K  E   P.O. 4760 550 5310         P. O. 4760 550 5310       I.V.  (mL/kg/hr) 350  (0.4) 0 350         I.V. 350  350         Volume (cefepime (MAXIPIME) 2 g in 0.9% sodium chloride (MBP/ADV) 100 mL)  0 0       Shift Total  (mL/kg) 5110  (77.4) 550  (7.9) 5660  (81.3)      O  U  T  P  U  T   Urine  (mL/kg/hr) 3500  (4.4) 900 4400         Urine Voided 3500 900 4400         Urine Occurrence(s) 3 x  3 x       Shift Total  (mL/kg) 3500  (53) 900  (12.9) 4400  (63.2)      NET 1610 -350 1260      Weight (kg) 66 69.6 69.6 69.6 69.6 69.6       CBC:  Recent Labs      12/26/17   0216  12/25/17   0247  12/24/17   1023   WBC  7.4  12.5*  15.1*   HGB  10.3*  9.8*  10.9*   HCT  31.4*  29.7*  32.7*   PLT  170  146*  784       Metabolic Panel:  Recent Labs      12/26/17   0216  12/25/17   0247  12/24/17   1435   NA  141  137  142   K  3.7  3.6  3.6   CL  110*  107  111*   CO2  20*  20*  22   BUN  31*  34*  35*   CREA  2.25*  2.67*  2.91*   GLU  90  91  101*   CA  9.9  9.0  8.4*   ALB  2.6*  2.4*   --    SGOT  6*  10*   --    ALT  15  15   --           For Billing    Chief Complaint   Patient presents with    Fatigue   [facility] Problems  Date Reviewed: 12/20/2017          Codes Class Noted POA    UTI (urinary tract infection) ICD-10-CM: N39.0  ICD-9-CM: 599.0  12/24/2017 Unknown        Altered mental status ICD-10-CM: R41.82  ICD-9-CM: 780.97  12/24/2017 Unknown        Acute kidney injury ([address] ICD-10-CM: N17.9  ICD-9-CM: 584.9  12/24/2017 Unknown

## 2017-12-27 VITALS
HEIGHT: 64 IN | HEART RATE: 96 BPM | DIASTOLIC BLOOD PRESSURE: 90 MMHG | BODY MASS INDEX: 26.38 KG/M2 | TEMPERATURE: 98 F | SYSTOLIC BLOOD PRESSURE: 142 MMHG | OXYGEN SATURATION: 97 % | WEIGHT: 154.54 LBS | RESPIRATION RATE: 18 BRPM

## 2017-12-27 LAB
ALBUMIN SERPL-MCNC: 2.8 G/DL (ref 3.5–5)
ALBUMIN/GLOB SERPL: 0.6 {RATIO} (ref 1.1–2.2)
ALP SERPL-CCNC: 73 U/L (ref 45–117)
ALT SERPL-CCNC: 14 U/L (ref 12–78)
ANION GAP SERPL CALC-SCNC: 12 MMOL/L (ref 5–15)
AST SERPL-CCNC: 11 U/L (ref 15–37)
BASOPHILS # BLD: 0.1 K/UL (ref 0–0.1)
BASOPHILS NFR BLD: 1 % (ref 0–1)
BILIRUB SERPL-MCNC: 0.1 MG/DL (ref 0.2–1)
BUN SERPL-MCNC: 29 MG/DL (ref 6–20)
BUN/CREAT SERPL: 17 (ref 12–20)
CALCIUM SERPL-MCNC: 9.9 MG/DL (ref 8.5–10.1)
CHLORIDE SERPL-SCNC: 108 MMOL/L (ref 97–108)
CO2 SERPL-SCNC: 22 MMOL/L (ref 21–32)
CREAT SERPL-MCNC: 1.71 MG/DL (ref 0.55–1.02)
EOSINOPHIL # BLD: 0.4 K/UL (ref 0–0.4)
EOSINOPHIL NFR BLD: 6 % (ref 0–7)
ERYTHROCYTE [DISTWIDTH] IN BLOOD BY AUTOMATED COUNT: 12.8 % (ref 11.5–14.5)
GLOBULIN SER CALC-MCNC: 4.5 G/DL (ref 2–4)
GLUCOSE SERPL-MCNC: 102 MG/DL (ref 65–100)
HCT VFR BLD AUTO: 31.6 % (ref 35–47)
HGB BLD-MCNC: 10.4 G/DL (ref 11.5–16)
LYMPHOCYTES # BLD: 3.1 K/UL (ref 0.8–3.5)
LYMPHOCYTES NFR BLD: 43 % (ref 12–49)
MCH RBC QN AUTO: 27.9 PG (ref 26–34)
MCHC RBC AUTO-ENTMCNC: 32.9 G/DL (ref 30–36.5)
MCV RBC AUTO: 84.7 FL (ref 80–99)
MONOCYTES # BLD: 0.5 K/UL (ref 0–1)
MONOCYTES NFR BLD: 8 % (ref 5–13)
NEUTS SEG # BLD: 2.9 K/UL (ref 1.8–8)
NEUTS SEG NFR BLD: 42 % (ref 32–75)
PLATELET # BLD AUTO: 210 K/UL (ref 150–400)
POTASSIUM SERPL-SCNC: 3.7 MMOL/L (ref 3.5–5.1)
PROT SERPL-MCNC: 7.3 G/DL (ref 6.4–8.2)
RBC # BLD AUTO: 3.73 M/UL (ref 3.8–5.2)
SODIUM SERPL-SCNC: 142 MMOL/L (ref 136–145)
TOPIRAMATE SERPL-MCNC: 8.2 UG/ML (ref 2–25)
WBC # BLD AUTO: 7 K/UL (ref 3.6–11)

## 2017-12-27 PROCEDURE — 36415 COLL VENOUS BLD VENIPUNCTURE: CPT | Performed by: FAMILY MEDICINE

## 2017-12-27 PROCEDURE — 74011250637 HC RX REV CODE- 250/637: Performed by: FAMILY MEDICINE

## 2017-12-27 PROCEDURE — 80053 COMPREHEN METABOLIC PANEL: CPT | Performed by: FAMILY MEDICINE

## 2017-12-27 PROCEDURE — 74011250636 HC RX REV CODE- 250/636: Performed by: FAMILY MEDICINE

## 2017-12-27 PROCEDURE — 85025 COMPLETE CBC W/AUTO DIFF WBC: CPT | Performed by: FAMILY MEDICINE

## 2017-12-27 RX ORDER — DOCUSATE SODIUM 100 MG/1
100 CAPSULE, LIQUID FILLED ORAL
Qty: 60 CAP | Refills: 0 | Status: SHIPPED
Start: 2017-12-27 | End: 2017-12-27

## 2017-12-27 RX ORDER — DOCUSATE SODIUM 100 MG/1
100 CAPSULE, LIQUID FILLED ORAL
Qty: 60 CAP | Refills: 0 | Status: SHIPPED | OUTPATIENT
Start: 2017-12-27 | End: 2018-03-27

## 2017-12-27 RX ADMIN — RISPERIDONE 2 MG: 1 TABLET, FILM COATED ORAL at 09:25

## 2017-12-27 RX ADMIN — HEPARIN SODIUM 5000 UNITS: 5000 INJECTION, SOLUTION INTRAVENOUS; SUBCUTANEOUS at 06:28

## 2017-12-27 RX ADMIN — BENZTROPINE 1 MG: 1 TABLET ORAL at 09:25

## 2017-12-27 RX ADMIN — HEPARIN SODIUM 5000 UNITS: 5000 INJECTION, SOLUTION INTRAVENOUS; SUBCUTANEOUS at 14:15

## 2017-12-27 RX ADMIN — DIVALPROEX SODIUM 500 MG: 500 TABLET, EXTENDED RELEASE ORAL at 09:27

## 2017-12-27 RX ADMIN — LEVOTHYROXINE SODIUM 75 MCG: 75 TABLET ORAL at 06:27

## 2017-12-27 RX ADMIN — Medication 10 ML: at 06:31

## 2017-12-27 NOTE — PROGRESS NOTES
Problem: Falls - Risk of  Goal: *Absence of Falls  Document Agatha Fall Risk and appropriate interventions in the flowsheet.    Outcome: Progressing Towards Goal  Fall Risk Interventions:  Mobility Interventions: Bed/chair exit alarm, Patient to call before getting OOB    Mentation Interventions: Bed/chair exit alarm, Door open when patient unattended, Family/sitter at bedside    Medication Interventions: Bed/chair exit alarm, Patient to call before getting OOB, Teach patient to arise slowly    Elimination Interventions: Bed/chair exit alarm, Call light in reach, Patient to call for help with toileting needs

## 2017-12-27 NOTE — ROUTINE PROCESS
Bedside shift change report given to Florence Cao (oncoming nurse) by Evette Langston (offgoing nurse). Report included the following information SBAR, Kardex and MAR.

## 2017-12-27 NOTE — PROGRESS NOTES
[facility]: [address] [facility] Neurology  [address] [place] [address]  [phone]  AMAYA Bowser    * Inpatient Progress Note *    NAME:  Ela Vines   :  1985  MRN:  [id number]  PCP:    Snow Wyatt, NP  ______________________________________________________________________  *Labs, studies, notes and hospital records were reviewed. --LUDIVINA Mueller  ______________________________________________________________________  Discussion with patient regarding :  · Results:  Topiramate level 8.2--stable   · Valproic Free level pending and VA level was 69 on --stable  ·  Medications:  Continue same doses of Valproic Acid and Topamax  · Pt mentation has improved. Appears the Lithium level was elevated at 1.88 on admission and today pt was conversant, shy, smiling and moving all extremities while in bed. · Pt to be discharged shortly.     No further Recommendations at this time  Stable for discharge Neurology  ____________________________________________________________________    Collaborating care team physician, Dr. Ramona Vaughn     ____________________________________________________________________    AMAYA Bowser-BC

## 2017-12-27 NOTE — PROGRESS NOTES
Problem: Falls - Risk of  Goal: *Absence of Falls  Document Agatha Fall Risk and appropriate interventions in the flowsheet.    Outcome: Progressing Towards Goal  Fall Risk Interventions:  Mobility Interventions: Patient to call before getting OOB    Mentation Interventions: Family/sitter at bedside, More frequent rounding    Medication Interventions: Patient to call before getting OOB, Teach patient to arise slowly    Elimination Interventions: Call light in reach, Patient to call for help with toileting needs         Family at bedside, no falls during admission    Problem: Seizure Disorder (Adult)  Goal: *STG: Remains free of seizure activity  Outcome: Progressing Towards Goal  No seizure activity noted, seizure precautions in place

## 2017-12-27 NOTE — DISCHARGE SUMMARY
[address] [facility] [address] [place] [address]   Office [phone]  Fax [phone]       Discharge Note     Name: Cristina Almeida MRN: [id number]  Sex: Female   YOB: 1985  Age: 33 y.o. PCP: Dominick Moreno NP     Date of admission: 12/24/2017  Date of discharge/transfer: 12/27/2017    Attending physician at admission: Dr. Pantera Jimenez  Attending physician at discharge/transfer: Dr. Pantera Jimenez  Resident physician at discharge/transfer: Pina No MD     Consultants during hospitalization  Neurology, Psychiatry     Admission diagnoses   UTI (urinary tract infection)  Altered mental status  Acute kidney injury [facility])    Recommended follow-up after discharge  · PCP, Psychiatrist     History of Present Illness    Per admitting provider: \" Cristina Almeida is a 33 y.o. female with known Bipolar affective disorder, MR, h/o partial/focal seizure, psychosis and hypothyroidism who presents to the ER complaining of fatigue, lethargy. History unable to be obtained from patient as she was lethargic. History provided by mom. Patient lives in group home, per mom they called her because pt couldn't bathe herself today and was very lethargic, no fever, no cough (that she knows of). EMS was called and she was brought to the ED. Mom reports that patient has been having urine incontinence since August and last Tuesday she was seen at PCP and diagnosed with UTI, she was being treated with Macrobid. Mom also reports that at baseline pt is able to dress, feed herself, takes her medications. Last seizure was 1 year ago and before that it was several years ago. Mom says that when patient has a seizure, her eyes stick to the left side, starts to chews, drool and her left hand starts pulsating. Mom thinks she may had a seizure before coming in because she stays sleeps a lot and looks lethargic after seizure. In the ED vitals were unremarkable.  Labs were remarkable for WBC 15.1, Cr 3.25 (baseline approximately 1.5), GFR 17 (baseline approximately 47). UA was cloudy with moderate blood, large leukocyte esterase, WBC 10-20 and few amorphous crystals. In the ED she was treated with 1 bolus (1L) of NS. \"    Hospital course    AGUS- Cr POA 3.25 (baseline approximately 1.5), GFR 17 (baseline approximately 47). 2/2 dehydration vs. Polypharmacy. Received IVF and seems to be hydrated on PE. CK 21 (L). FeNA 2.63%. Renal ultrasound without acute findings. Last Cr check 1.71.   - Monitor with Basic metabolic panel   - Stop taking Naproxen  - If no improvement or there's worsening kidney function, consider Nephrology consult      Sepsis 2/2 UTI with 2/4 SIRS criteria- resolved. With WBC 15.1, RR 25 POA. H/o frequent urinary incontinence and diagnosed with UTI on 12/20 which was treated with Macrobid. UA POA was cloudy with moderate blood, large leukocyte esterase, WBC 10-20 and few amorphous crystals. Pt is s/p 2 L bolus. LA 0.9-->1.0. Improved WBC 7. Final urine culture <1000 colonies, no growth x1 day. BCx no growth x3 days. She received Vancomycin and Cefepime for 2 days. Leukocytosis- resolved. WBC POA 15.1--> 7.0. UA with signs of infection but negative urine culture. Chest xray showed no acute process. Rapid influenza negative.       Metabolic encephalopathy- resolved. In the setting of AMS due to dehydration, UTI and possible polypharmacy. UDS negative. Initial Lithium was 2.78--> 1.9 --> 1.09 (now normal). Topiramate level normal. Neurology consulted and they agreed this could be 2/2 dehydration.  - F/u Valproic acid level      Prolong QTc- QTc . Avoid QTc prolonging medications      H/o Seizures- initially we thought seizure activity was probably a component of AMS but dehydration was the most likely cause as patient status improved after hydration. Continue home medication of Depakote 500 mg BID, Cogentin 1 mg BID and Topamax 200 mg QHS      Bipolar affective disorder-  Pt with toxic levels of lithium.  Last lithium check 1.09 (normal). Psychiatry evaluated the patient and said to discontinue Lithium. Pt followed OP by Dr. Basia Bright. Continue home medication of Risperidone 2 mg daily.   - Stop taking Lithium       Hypothyroidism- Last TSH in 02/2017 was 1.010. Continue home medications of Synthroid 75 mcg daily.       Constipation- mom reports that patient has a h/o constipation and has taken Colace in the past.   - Colace 100 mg daily PRN       Physical exam at discharge:    Vitals Reviewed. General Appears well-nourished, no distress. Not diaphoretic. HENT Head Normocephalic and atraumatic. Eyes Conjunctivae are normal, no discharge. No scleral icterus. Nose Nose normal, clear turbinates. Oral Oropharynx is clear and moist. No oropharyngeal exudate. Neck No thyromegaly present. No cervical adenopathy. Cardio Normal rate, regular rhythm. Exam reveals no gallop and no friction rub. No murmur heard. No chest wall tenderness. Pulmonary Effort normal and breath sounds normal. No respiratory distress. No wheezes, no rales. Abdominal Soft. Bowel sounds normal. No distension. No tenderness.  Deferred. Extremities No edema of lower extremities. No tenderness. Distal pulses intact. Neurological Alert and oriented to person, place, and time. Dermatology Skin is warm and dry. No rash noted. No erythema or pallor. Psychiatric Affect and judgment normal.        Condition at discharge: Stable.     Labs  Recent Labs      12/27/17 0351 12/26/17 0216   WBC  7.0  7.4   HGB  10.4*  10.3*   HCT  31.6*  31.4*   PLT  210  170     Recent Labs      12/27/17 0351 12/26/17 0216   NA  142  141   K  3.7  3.7   CL  108  110*   CO2  22  20*   BUN  29*  31*   CREA  1.71*  2.25*   GLU  102*  90   CA  9.9  9.9     Recent Labs      12/27/17 0351 12/26/17 0216   SGOT  11*  6*   ALT  14  15   AP  73  75   TBILI  0.1*  0.2   TP  7.3  7.2   ALB  2.8*  2.6*   GLOB  4.5*  4.6*     No results for input(s): PH, PCO2, PO2, TNIPOC, TROIQ, INR, PTP, APTT, FE, TIBC, PSAT, FERR, GLUCPOC in the last 72 hours. No lab exists for component: GLPOC, INREXT, INREXT  Cultures  · Urine culture    Procedures / Diagnostic Studies  · None    Imaging    Results from Hospital Encounter encounter on 12/24/17   XR CHEST PORT   Narrative Indication: Altered mental status, leukocytosis    Comparison: None    Portable exam of the chest obtained at 1216 demonstrates normal heart size. There is no acute process in the lung fields. The osseous structures are  unremarkable. Impression Impression: No acute process. Results from [place] encounter on 12/24/17   US RETROPERITONEUM COMP   Narrative Indication: Acute renal failure    Realtime sonographic imaging of the retroperitoneum was performed. The right  kidney measures 9.2 cm and the left kidney measures 10.1 cm. They are normal in  size and appearance without evidence of mass lesion, hydronephrosis, or  calcification. The bladder is unremarkable as is the visualized portion of the  abdominal aorta and IVC. The common iliac bifurcation is not visualized due to  bowel gas. Impression IMPRESSION: Normal renal ultrasound. Results from [place] encounter on 04/22/15   CT HEAD WO CONT   Narrative **Final Report**      ICD Codes / Adm. Diagnosis: 728.87  781.94 / Muscle weakness (generalized)    Facial weakness  Examination:  CT HEAD WO CON  - [id number] - Apr 22 2015  2:35PM  Accession No:  [id number]  Reason:  R sided facial droop 4/5      REPORT:  INDICATION:   R sided facial droop 4/5     EXAM:  HEAD CT WITHOUT CONTRAST    COMPARISON:  None    TECHNIQUE:  Routine noncontrast axial head CT was performed. FINDINGS:    The ventricles are midline without hydrocephalus. There is no acute intra   or extra-axial hemorrhage. There is no significant white matter disease. The basal cisterns are patent. The paranasal sinuses are clear. IMPRESSION:    No acute process. Signing/Reading Doctor: Aden Coughlin ([id number])    Approved: Aden Coughlin ([id number])  Apr 22 2015  3:05PM                                           Chronic diagnoses   Problem List as of 12/27/2017  Date Reviewed: 12/20/2017          Codes Class Noted - Resolved    UTI (urinary tract infection) ICD-10-CM: N39.0  ICD-9-CM: 599.0  12/24/2017 - Present        Altered mental status ICD-10-CM: R41.82  ICD-9-CM: 780.97  12/24/2017 - Present        Acute kidney injury ([facility] ICD-10-CM: N17.9  ICD-9-CM: 584.9  12/24/2017 - Present        Hypothyroid ICD-10-CM: E03.9  ICD-9-CM: 244.9  1/29/2015 - Present        Psychosis ICD-10-CM: F29  ICD-9-CM: 298.9  8/25/2011 - Present        Encounter for long-term (current) use of high-risk medication ICD-10-CM: Z79.899  ICD-9-CM: V58.69  8/25/2011 - Present        Localization-related (focal) (partial) epilepsy and epileptic syndromes with simple partial seizures, without mention of intractable epilepsy ICD-10-CM: G40.109  ICD-9-CM: 345.50  8/25/2011 - Present        Bipolar affective ([facility].) ICD-10-CM: F31.9  ICD-9-CM: 296.80  11/8/2010 - Present        MR (mental retardation), severe ICD-10-CM: F72  ICD-9-CM: 318.1  11/8/2010 - Present              Discharge/Transfer Medications  Discharge Medication List as of 12/27/2017  3:58 PM      CONTINUE these medications which have CHANGED    Details   docusate sodium (COLACE) 100 mg capsule Take 1 Cap by mouth two (2) times daily as needed for Constipation for up to 90 days. , Print, Disp-60 Cap, R-0         CONTINUE these medications which have NOT CHANGED    Details   divalproex ER (DEPAKOTE ER) 500 mg ER tablet Take 500 mg by mouth two (2) times a day., Historical Med      risperiDONE (RISPERDAL) 2 mg tablet Take 2 mg by mouth daily. , Historical Med      topiramate (TOPAMAX) 200 mg tablet Take 200 mg by mouth nightly., Historical Med      fluticasone (FLONASE) 50 mcg/actuation nasal spray 2 Sprays by Both Nostrils route daily. , Historical Med      cetirizine (ZYRTEC) 10 mg tablet Take 1 Tab by mouth daily. , Normal, Disp-30 Tab, R-5      levothyroxine (SYNTHROID) 75 mcg tablet Take 1 po qam., Normal, Disp-30 Tab, R-5      drospirenone-ethinyl estradiol (ANAYA) 3-0.02 mg tab Take 1 Tab by mouth daily. , Normal, Disp-1 Package, R-11      hydrOXYzine (VISTARIL) 25 mg capsule Take 25 mg by mouth three (3) times daily as needed for Anxiety (aggression). , Historical Med      zolpidem (AMBIEN) 5 mg tablet Take  by mouth nightly as needed for Sleep., Historical Med      traZODone (DESYREL) 100 mg tablet Take 100 mg by mouth nightly., Historical Med      benztropine (COGENTIN) 1 mg tablet Take 1 Tab by mouth two (2) times a day., Normal, Disp-60 Tab, R-3      diazepam (VALIUM) 2 mg tablet Take 1 tablet by mouth as needed for Anxiety. Take 1 hr prior to procedure, may repeat 15 min before procedure as well, Print, Disp-2 tablet, R-0         STOP taking these medications       nitrofurantoin, macrocrystal-monohydrate, (MACROBID) 100 mg capsule Comments:   Reason for Stopping:         naproxen (NAPROSYN) 500 mg tablet Comments:   Reason for Stopping:         lithium carbonate CR (ESKALITH CR) 450 mg CR tablet Comments:   Reason for Stopping:                Diet:  Regular diet.     Activity:  As tolerated    Disposition:  Group Home     Discharge instructions to patient/family  Please seek medical attention for any new or worsening symptoms particularly fever, chest pain, shortness of breath, abdominal pain, nausea, vomiting    Follow up plans/appointments  Follow-up Information     Follow up With Details Comments [facility] [facility], NP On 1/3/2018 At 9:45 am for hospitalization follow up  [facility]  [phone]             Ramone Neves MD  Family Medicine Resident       For Billing    Chief Complaint   Patient presents with    Fatigue    Seizure       Hospital Problems  Date Reviewed: 12/20/2017          Codes Class Noted POA    UTI (urinary tract infection) ICD-10-CM: N39.0  ICD-9-CM: 599.0  12/24/2017 Unknown        Altered mental status ICD-10-CM: R41.82  ICD-9-CM: 780.97  12/24/2017 Unknown        Acute kidney injury [facility]) ICD-10-CM: N17.9  ICD-9-CM: 584.9  12/24/2017 Unknown

## 2017-12-27 NOTE — ROUTINE PROCESS
Orthopedic & Surgical Nursing Communication Tool        12/27/2017     Bedside and Verbal shift change report given to Camron Aden RN (incoming nurse) by Alexei Junior RN (outgoing nurse) on Elan Marmolejo a 33 y.o. female who was admitted on 12/24/2017  9:59 AM. Report included the following information SBAR, Intake/Output, MAR and Recent Results. Opportunity for questions and clarifications were given to the incoming nurse. Patient's bed is in low position, side rails x3, family at bedside, call bell within reach and patient not in distress. Hourly rounding performed throughout shift.     Alexei Junior RN

## 2017-12-27 NOTE — DISCHARGE INSTRUCTIONS
[facility] / Alfreida Primrose / [id number] : 1985    Admission date: 2017 Discharge date: 2017       Primary care provider: Alethea Carrera NP    Discharging provider:  Eric Saravia MD  - Family Medicine Resident  Alexis Brandon MD - Attending, Family Medicine   . . . . . . . . . . . . . . . . . . . . . . . . . . . . . . . . . . . . . . . . . . . . . . . . . . . . . . . . . . . . . . . . . . . . . . . L[facility] FINAL DIAGNOSES & HOSPITAL COURSE:  AGUS- Cr POA 3.25 (baseline approximately 1.5), GFR 17 (baseline approximately 47). 2/2 dehydration vs. Polypharmacy. Received IVF and seems to be hydrated on PE. CK 21 (L). FeNA 2.63%. Renal ultrasound without acute findings. Last Cr check 1.71.   - Monitor with Basic metabolic panel   - Stop taking Naproxen as this can worsen kidney function   - If no improvement or there's worsening kidney function, consider Nephrology consult      Sepsis 2/2 UTI with 2/4 SIRS criteria- resolved. With WBC 15.1, RR 25 POA. H/o frequent urinary incontinence and diagnosed with UTI on  which was treated with Macrobid. UA POA was cloudy with moderate blood, large leukocyte esterase, WBC 10-20 and few amorphous crystals. Pt is s/p 2 L bolus. LA 0.9-->1.0. Improved WBC 7. Final urine culture <1000 colonies, no growth x1 day. BCx no growth x3 days. She received Vancomycin and Cefepime for 2 days. Leukocytosis- resolved. WBC POA 15.1--> 7.0. UA with signs of infection but negative urine culture. Chest xray showed no acute process. Rapid influenza negative.       Metabolic encephalopathy- resolved. In the setting of AMS due to dehydration, UTI and possible polypharmacy. UDS negative. Initial Lithium was 2.78--> 1.9 --> 1.09 (now normal). Topiramate level normal. Neurology consulted and they agreed this could be 2/2 dehydration.  - F/u Valproic acid level      Prolong QTc- QTc .  Avoid QTc prolonging medications      H/o Seizures- initially we thought seizure activity was probably a component of AMS but dehydration was the most likely cause as patient status improved after hydration. Continue home medication of Depakote 500 mg BID, Cogentin 1 mg BID and Topamax 200 mg QHS      Bipolar affective disorder-  Pt with toxic levels of lithium. Last lithium check 1.09 (normal). Psychiatry evaluated the patient and said to discontinue Lithium. Pt followed OP by Dr. Marguerite Fisher. Continue home medication of Risperidone 2 mg daily.   - Stop taking Lithium       Hypothyroidism- Last TSH in 02/2017 was 1.010. Continue home medications of Synthroid 75 mcg daily.       Constipation- mom reports that patient has a h/o constipation and has taken Colace in the past.   - Colace 100 mg daily PRN      FOLLOW-UP CARE RECOMMENDATIONS:  Follow-up Information     Follow up With Details Comments Contact Info    Shayy Moser NP On 1/3/2018 At 9:45 am for hospitalization follow up  [address]  [phone]              It is very important that you keep follow-up appointment(s). Bring discharge papers, medication list (and/or medication bottles) to follow-up appointments for review by outpatient provider(s). FOLLOW-UP TESTS RECOMMENDED:   · Basic metabolic panel (BMP) to check sodium and creatinine  · STOP lithium   · STOP Naproxen  · Use ambian only as needed for sleep    ONGOING TREATMENT PLAN: See above    PENDING TEST RESULTS:  At the time of discharge the following test results are still pending: Valproic acid level. Please review these results as they become available. Specific symptoms to watch for: chest pain, shortness of breath, fever, chills, nausea, vomiting, diarrhea, change in mentation, falling, weakness, bleeding.     DIET:  Regular Diet    ACTIVITY:  Per PT/OT    WOUND CARE: none    EQUIPMENT needed:  none    INCIDENTAL FINDINGS:  none    GOALS OF CARE:    Eventual return to home/independent/assisted living   X  Group home     Hospice     No rehospitalization     Patient condition at discharge:   Functional status    Poor    X  Deconditioned      Independent   Cognition    Lucid     Forgetful (some sensescence)   X  Intellectual disability    Catheters/lines (plus indication)    Zelaya     PICC      PEG    X  None   Code status  X  Full code      DNR    . . . . . . . . . . . . . . . . . . . . . . . . . . . . . . . . . . . . . . . . . . . . . . . . . . . . . . . . . . . . . . . . . . . . . . . Yina Quijano CHRONIC MEDICAL CONDITIONS:  Problem List as of 12/27/2017  Date Reviewed: 12/20/2017          Codes Class Noted - Resolved    UTI (urinary tract infection) ICD-10-CM: N39.0  ICD-9-CM: 599.0  12/24/2017 - Present        Altered mental status ICD-10-CM: R41.82  ICD-9-CM: 780.97  12/24/2017 - Present        Acute kidney injury ([facility].) ICD-10-CM: N17.9  ICD-9-CM: 584.9  12/24/2017 - Present        Hypothyroid ICD-10-CM: E03.9  ICD-9-CM: 244.9  1/29/2015 - Present        Psychosis ICD-10-CM: F29  ICD-9-CM: 298.9  8/25/2011 - Present        Encounter for long-term (current) use of high-risk medication ICD-10-CM: Z79.899  ICD-9-CM: V58.69  8/25/2011 - Present        Localization-related (focal) (partial) epilepsy and epileptic syndromes with simple partial seizures, without mention of intractable epilepsy ICD-10-CM: G40.109  ICD-9-CM: 345.50  8/25/2011 - Present        Bipolar affective ([facility] ICD-10-CM: F31.9  ICD-9-CM: 296.80  11/8/2010 - Present        MR (mental retardation), severe ICD-10-CM: F72  ICD-9-CM: 318.1  11/8/2010 - Present              Information obtained by :   I understand that if any problems occur once I am at home I am to contact my physician. I understand and acknowledge receipt of the instructions indicated above.                                                                                                                                              Physician's or R.N.'s Signature                                                                  Date/Time                                                                                                                                              Patient or Representative Signature                                                          Date/Time

## 2017-12-27 NOTE — ROUTINE PROCESS
Discharge instructions reviewed with patient and mother (primary caregiver), mother received a copy and signed our paper copy that was put into the chart. IV removed. Mother given prescriptions and refused a wheelchair with no additional questions.

## 2017-12-27 NOTE — PROGRESS NOTES
12/27/2017  3:50 PM  CM met with pt and pt's mother for assessment. Demographics and PCP were confirmed. Pt is a 33year old,  female who lives in a group home, [facility] ([id number]). CM confirmed with Norris, [facility], that pt is able to return to the group home. Pt's mother will transport. Pt uses no DME, but needs assistance in completing ADLs. No discharge service needs indicated. Patel Camilo MA    Care Management Interventions  PCP Verified by CM: Yes Darin Jewell)  Palliative Care Criteria Met (RRAT>21 & CHF Dx)?: No  Mode of Transport at Discharge: Other (see comment) (Mother)  MyChart Signup: No  Discharge Durable Medical Equipment: No  Physical Therapy Consult: Yes  Occupational Therapy Consult: Yes  Speech Therapy Consult: No  Current Support Network:  Adult Group Home ([id number] [place] [address]: [id number])  Confirm Follow Up Transport: Other (see comment)  Plan discussed with Pt/Family/Caregiver: Yes  Discharge Location  Discharge Placement: Group home

## 2017-12-27 NOTE — PROGRESS NOTES
Pharmacist Discharge Medication Reconciliation    Discharge Provider:  [phone]       Discharge Medications:      My Medications        STOP taking these medications              lithium carbonate  mg CR tablet   Commonly known as:  ESKALITH CR           naproxen 500 mg tablet   Commonly known as:  NAPROSYN           nitrofurantoin (macrocrystal-monohydrate) 100 mg capsule   Commonly known as:  MACROBID                 TAKE these medications as instructed         Instructions Each Dose to Equal   Morning Noon Evening Bedtime      benztropine 1 mg tablet   Commonly known as:  COGENTIN       Your last dose was: Your next dose is: Take 1 Tab by mouth two (2) times a day. 1 mg                        cetirizine 10 mg tablet   Commonly known as:  ZYRTEC       Your last dose was: Your next dose is: Take 1 Tab by mouth daily. 10 mg                        DEPAKOTE  mg ER tablet   Generic drug:  divalproex ER       Your last dose was: Your next dose is: Take 500 mg by mouth two (2) times a day. 500 mg                        diazePAM 2 mg tablet   Commonly known as:  VALIUM       Your last dose was: Your next dose is: Take 1 tablet by mouth as needed for Anxiety. Take 1 hr prior to procedure, may repeat 15 min before procedure as well    2 mg                        docusate sodium 100 mg capsule   Commonly known as:  COLACE       Your last dose was: Your next dose is: Take 1 Cap by mouth two (2) times daily as needed for Constipation for up to 90 days. 100 mg                        drospirenone-ethinyl estradiol 3-0.02 mg Tab   Commonly known as:  ANAYA       Your last dose was: Your next dose is: Take 1 Tab by mouth daily. 1 Tab                        FLONASE 50 mcg/actuation nasal spray   Generic drug:  fluticasone       Your last dose was:          Your next dose is: 2 Sprays by Both Nostrils route daily. 2 Spray                        hydrOXYzine pamoate 25 mg capsule   Commonly known as:  VISTARIL       Your last dose was: Your next dose is: Take 25 mg by mouth three (3) times daily as needed for Anxiety (aggression). 25 mg                        levothyroxine 75 mcg tablet   Commonly known as:  SYNTHROID       Your last dose was: Your next dose is: Take 1 po qam.                         risperiDONE 2 mg tablet   Commonly known as:  RisperDAL       Your last dose was: Your next dose is: Take 2 mg by mouth daily. 2 mg                        topiramate 200 mg tablet   Commonly known as:  TOPAMAX       Your last dose was: Your next dose is: Take 200 mg by mouth nightly. 200 mg                        traZODone 100 mg tablet   Commonly known as:  DESYREL       Your last dose was: Your next dose is: Take 100 mg by mouth nightly. 100 mg                        zolpidem 5 mg tablet   Commonly known as:  AMBIEN       Your last dose was: Your next dose is: Take  by mouth nightly as needed for Sleep. Where to Get Your Medications        Information on where to get these meds will be given to you by the nurse or doctor. !  Ask your nurse or doctor about these medications     docusate sodium 100 mg capsule              The patient's chart, MAR, and AVS were reviewed by   DANNY Santacruz,   Contact: [phone]

## 2017-12-28 LAB — VALPROATE FREE SERPL-MCNC: 40.4 UG/ML (ref 6–22)

## 2017-12-30 LAB
BACTERIA SPEC CULT: NORMAL
SERVICE CMNT-IMP: NORMAL

## 2018-01-03 ENCOUNTER — OFFICE VISIT (OUTPATIENT)
Dept: FAMILY MEDICINE CLINIC | Age: 33
End: 2018-01-03

## 2018-01-03 VITALS
OXYGEN SATURATION: 99 % | BODY MASS INDEX: 23.39 KG/M2 | WEIGHT: 137 LBS | HEART RATE: 98 BPM | RESPIRATION RATE: 16 BRPM | SYSTOLIC BLOOD PRESSURE: 111 MMHG | DIASTOLIC BLOOD PRESSURE: 65 MMHG | TEMPERATURE: 98.7 F | HEIGHT: 64 IN

## 2018-01-03 DIAGNOSIS — D50.9 IRON DEFICIENCY ANEMIA, UNSPECIFIED IRON DEFICIENCY ANEMIA TYPE: ICD-10-CM

## 2018-01-03 DIAGNOSIS — R79.89 ELEVATED LITHIUM LEVEL: ICD-10-CM

## 2018-01-03 DIAGNOSIS — N17.9 AKI (ACUTE KIDNEY INJURY) (HCC): Primary | ICD-10-CM

## 2018-01-03 NOTE — PROGRESS NOTES
1. Have you been to the ER, urgent care clinic since your last visit? Hospitalized since your last visit? Yes, [facility], 12/24/17    2. Have you seen or consulted any other health care providers outside of the [address] [place] [address] since your last visit? Include any pap smears or colon screening.  No     Chief Complaint   Patient presents with   [facility], 12/24/17    Labs     [place]

## 2018-01-03 NOTE — MR AVS SNAPSHOT
Visit Information Date & Time Provider Department Dept. Phone Encounter #  
 1/3/2018  9:45 AM Dominick Moreno NP [address] [facility] [phone] [id number] Upcoming Health Maintenance Date Due  
 PAP AKA CERVICAL CYTOLOGY 5/27/2018 DTaP/Tdap/Td series (2 - Td) 2/4/2025 Allergies as of 1/3/2018  Review Complete On: 1/3/2018 By: Dominick Moreno NP Severity Noted Reaction Type Reactions Bactrim [Sulfamethoprim]  01/03/2018    Other (comments) Dehydration Phenobarbital  08/24/2011    Unknown (comments) Unable to obtain Current Immunizations  Reviewed on 2/9/2016 Name Date Influenza Vaccine (Quad) PF 11/13/2014 TB Skin Test (PPD) Intradermal 2/9/2016, 2/4/2014, 2/5/2013 Tdap 2/4/2015 Not reviewed this visit You Were Diagnosed With   
  
 Codes Comments AGUS (acute kidney injury) ([facility]    -  Primary ICD-10-CM: N17.9 ICD-9-CM: 215. 9 Elevated lithium level     ICD-10-CM: R79.9 ICD-9-CM: 790.99 Iron deficiency anemia, unspecified iron deficiency anemia type     ICD-10-CM: D50.9 ICD-9-CM: 280.9 Vitals BP Pulse Temp Resp Height(growth percentile) Weight(growth percentile) 111/65 98 98.7 °F (37.1 °C) (Oral) 16 5' 4\" (1.626 m) 137 lb (62.1 kg) LMP SpO2 BMI OB Status Smoking Status 12/26/2017 (Exact Date) 99% 23.52 kg/m2 Having regular periods Never Smoker BMI and BSA Data Body Mass Index Body Surface Area  
 23.52 kg/m 2 1.67 m 2 Preferred Pharmacy Pharmacy Name Phone [facility] [address], [address] [phone] Your Updated Medication List  
  
   
This list is accurate as of: 1/3/18 10:34 AM.  Always use your most recent med list.  
  
  
  
  
 benztropine 1 mg tablet Commonly known as:  COGENTIN Take 1 Tab by mouth two (2) times a day. cetirizine 10 mg tablet Commonly known as:  ZYRTEC Take 1 Tab by mouth daily. DEPAKOTE  mg ER tablet Generic drug:  divalproex ER Take 500 mg by mouth two (2) times a day. diazePAM 2 mg tablet Commonly known as:  VALIUM Take 1 tablet by mouth as needed for Anxiety. Take 1 hr prior to procedure, may repeat 15 min before procedure as well  
  
 docusate sodium 100 mg capsule Commonly known as:  Vincent Burger Take 1 Cap by mouth two (2) times daily as needed for Constipation for up to 90 days. drospirenone-ethinyl estradiol 3-0.02 mg Tab Commonly known as:  ANAYA Take 1 Tab by mouth daily. FLONASE 50 mcg/actuation nasal spray Generic drug:  fluticasone 2 Sprays by Both Nostrils route daily. hydrOXYzine pamoate 25 mg capsule Commonly known as:  VISTARIL Take 25 mg by mouth three (3) times daily as needed for Anxiety (aggression). levothyroxine 75 mcg tablet Commonly known as:  SYNTHROID Take 1 po qam.  
  
 risperiDONE 2 mg tablet Commonly known as:  RisperDAL Take 2 mg by mouth daily. topiramate 200 mg tablet Commonly known as:  TOPAMAX Take 200 mg by mouth nightly. traZODone 100 mg tablet Commonly known as:  Thurnell Je Take 100 mg by mouth nightly. zolpidem 5 mg tablet Commonly known as:  AMBIEN Take  by mouth nightly as needed for Sleep. We Performed the Following CBC WITH AUTOMATED DIFF [64843 CPT(R)] LITHIUM [id number] CPT(R)] METABOLIC PANEL, COMPREHENSIVE [53106 CPT(R)] Introducing [facility] & HEALTH SERVICES! Dear John Flor: Thank you for requesting a [employer] account. Our records indicate that you have previously registered for a [employer] account but its currently inactive. Please call our [employer] support line at [phone]. Additional Information If you have questions, please visit the Frequently Asked Questions section of the [employer] website at [email or web address] Remember, [employer] is NOT to be used for urgent needs. For medical emergencies, dial 911. Now available from your iPhone and Android! Please provide this summary of care documentation to your next provider. Your primary care clinician is listed as Carlos Goetz. If you have any questions after today's visit, please call [phone].

## 2018-01-03 NOTE — PROGRESS NOTES
Chief Complaint   Patient presents with   [facility], 12/24/17    Labs     [place]     she is a 33y.o. year old female who presents for evalution. Pt here for hospital F/U. Was admitted for possible sepsis and dehydration, lithium toxicity. Has been D/C from lithium. Having behavior problems at home since no longer on lithium. Has appt with psychiatrist on 1/9/17 but they will not call in rx for pt until labs are rechecked. Hospital records and medications reviewed. Reviewed PmHx, RxHx, FmHx, SocHx, AllgHx and updated and dated in the chart. Review of Systems - negative except as listed above in the HPI    Objective:     Vitals:    01/03/18 1022   BP: 111/65   Pulse: 98   Resp: 16   Temp: 98.7 °F (37.1 °C)   TempSrc: Oral   SpO2: 99%   Weight: 137 lb (62.1 kg)   Height: 5' 4\" (1.626 m)     Physical Examination: General appearance - alert, well appearing, and in no distress and uncooperative  Mental status - alert, oriented to person, place, and time, anxious, agitated  Chest - clear to auscultation, no wheezes, rales or rhonchi, symmetric air entry  Heart - normal rate, regular rhythm, normal S1, S2, no murmurs, rubs, clicks or gallops    Assessment/ Plan:   Diagnoses and all orders for this visit:    1. AGUS (acute kidney injury) ([facility]  -     METABOLIC PANEL, COMPREHENSIVE  Will decide on F/U after reviewing labs. 2. Elevated lithium level  -     LITHIUM  Should be back to zero but need updated labs per psych before new rxs will be given. 3. Iron deficiency anemia, unspecified iron deficiency anemia type  -     CBC WITH AUTOMATED DIFF  Will decide on F/U after reviewing labs. Pt voiced understanding regarding plan of care. Follow-up Disposition:  Return if symptoms worsen or fail to improve. I have discussed the diagnosis with the patient and the intended plan as seen in the above orders.   The patient has received an after-visit summary and questions were answered concerning future plans.      Medication Side Effects and Warnings were discussed with patient    Damaris Welch NP

## 2018-01-03 NOTE — LETTER
1/4/2018 11:38 AM 
 
Ms. Javon Singh [address] [place] [address] [place] [address] [place] [phone] Dear Javon Singh: Please find your most recent results below. Resulted Orders CBC WITH AUTOMATED DIFF Result Value Ref Range WBC 7.6 3.4 - 10.8 x10E3/uL  
 RBC 3.86 3.77 - 5.28 x10E6/uL HGB 11.0 (L) 11.1 - 15.9 g/dL HCT 34.5 34.0 - 46.6 % MCV 89 79 - 97 fL  
 MCH 28.5 26.6 - 33.0 pg  
 MCHC 31.9 31.5 - 35.7 g/dL  
 RDW 13.3 12.3 - 15.4 % PLATELET 373 107 - 989 x10E3/uL NEUTROPHILS 67 Not Estab. % Lymphocytes 27 Not Estab. % MONOCYTES 5 Not Estab. % EOSINOPHILS 1 Not Estab. % BASOPHILS 0 Not Estab. %  
 ABS. NEUTROPHILS 5.0 1.4 - 7.0 x10E3/uL Abs Lymphocytes 2.0 0.7 - 3.1 x10E3/uL  
 ABS. MONOCYTES 0.4 0.1 - 0.9 x10E3/uL  
 ABS. EOSINOPHILS 0.1 0.0 - 0.4 x10E3/uL  
 ABS. BASOPHILS 0.0 0.0 - 0.2 x10E3/uL IMMATURE GRANULOCYTES 0 Not Estab. %  
 ABS. IMM. GRANS. 0.0 0.0 - 0.1 x10E3/uL Narrative Performed at:  [place] [id number] : Brendan Wang MD, Phone:  [phone] METABOLIC PANEL, COMPREHENSIVE Result Value Ref Range Glucose 101 (H) 65 - 99 mg/dL BUN 13 6 - 20 mg/dL Creatinine 1.44 (H) 0.57 - 1.00 mg/dL GFR est non-AA 48 (L) >59 mL/min/1.73 GFR est AA 56 (L) >59 mL/min/1.73  
 BUN/Creatinine ratio 9 9 - 23 Sodium 142 134 - 144 mmol/L Potassium 4.0 3.5 - 5.2 mmol/L Chloride 100 96 - 106 mmol/L  
 CO2 22 18 - 29 mmol/L Calcium 9.5 8.7 - 10.2 mg/dL Protein, total 7.4 6.0 - 8.5 g/dL Albumin 4.1 3.5 - 5.5 g/dL GLOBULIN, TOTAL 3.3 1.5 - 4.5 g/dL A-G Ratio 1.2 1.2 - 2.2 Bilirubin, total <0.2 0.0 - 1.2 mg/dL Alk. phosphatase 53 39 - 117 IU/L  
 AST (SGOT) 13 0 - 40 IU/L  
 ALT (SGPT) 16 0 - 32 IU/L Narrative Performed at:  [address]  [id number] : Brendan Wang MD, Phone:  [phone] LITHIUM  
 Result Value Ref Range Lithium level <0.1 (L) 0.6 - 1.2 mmol/L Comment: **Verified by repeat analysis** Detection Limit = 0.1 
                          <0.1 indicates None Detected Narrative Performed at:  [address]  [id number] : Ezio Romero MD, Phone:  [phone] CKD REPORT Result Value Ref Range Interpretation Note Comment:  
   Supplemental report is available. Narrative Performed at:  [address] [place] [address] [address] [place] [address]  [id number] : Avtar Tijerina PhD, Phone:  [phone] RECOMMENDATIONS: 
Please inform group home that anemia is improving - recheck in 1-2 months to ensure back to normal.  
Kidney function also improving. Lithium level at zero. Please call me if you have any questions: [phone] Sincerely, Damaris Welch NP

## 2018-01-04 LAB
ALBUMIN SERPL-MCNC: 4.1 G/DL (ref 3.5–5.5)
ALBUMIN/GLOB SERPL: 1.2 {RATIO} (ref 1.2–2.2)
ALP SERPL-CCNC: 53 IU/L (ref 39–117)
ALT SERPL-CCNC: 16 IU/L (ref 0–32)
AST SERPL-CCNC: 13 IU/L (ref 0–40)
BASOPHILS # BLD AUTO: 0 X10E3/UL (ref 0–0.2)
BASOPHILS NFR BLD AUTO: 0 %
BILIRUB SERPL-MCNC: <0.2 MG/DL (ref 0–1.2)
BUN SERPL-MCNC: 13 MG/DL (ref 6–20)
BUN/CREAT SERPL: 9 (ref 9–23)
CALCIUM SERPL-MCNC: 9.5 MG/DL (ref 8.7–10.2)
CHLORIDE SERPL-SCNC: 100 MMOL/L (ref 96–106)
CO2 SERPL-SCNC: 22 MMOL/L (ref 18–29)
CREAT SERPL-MCNC: 1.44 MG/DL (ref 0.57–1)
EOSINOPHIL # BLD AUTO: 0.1 X10E3/UL (ref 0–0.4)
EOSINOPHIL NFR BLD AUTO: 1 %
ERYTHROCYTE [DISTWIDTH] IN BLOOD BY AUTOMATED COUNT: 13.3 % (ref 12.3–15.4)
GLOBULIN SER CALC-MCNC: 3.3 G/DL (ref 1.5–4.5)
GLUCOSE SERPL-MCNC: 101 MG/DL (ref 65–99)
HCT VFR BLD AUTO: 34.5 % (ref 34–46.6)
HGB BLD-MCNC: 11 G/DL (ref 11.1–15.9)
IMM GRANULOCYTES # BLD: 0 X10E3/UL (ref 0–0.1)
IMM GRANULOCYTES NFR BLD: 0 %
INTERPRETATION: NORMAL
LITHIUM SERPL-SCNC: <0.1 MMOL/L (ref 0.6–1.2)
LYMPHOCYTES # BLD AUTO: 2 X10E3/UL (ref 0.7–3.1)
LYMPHOCYTES NFR BLD AUTO: 27 %
MCH RBC QN AUTO: 28.5 PG (ref 26.6–33)
MCHC RBC AUTO-ENTMCNC: 31.9 G/DL (ref 31.5–35.7)
MCV RBC AUTO: 89 FL (ref 79–97)
MONOCYTES # BLD AUTO: 0.4 X10E3/UL (ref 0.1–0.9)
MONOCYTES NFR BLD AUTO: 5 %
NEUTROPHILS # BLD AUTO: 5 X10E3/UL (ref 1.4–7)
NEUTROPHILS NFR BLD AUTO: 67 %
PLATELET # BLD AUTO: 304 X10E3/UL (ref 150–379)
POTASSIUM SERPL-SCNC: 4 MMOL/L (ref 3.5–5.2)
PROT SERPL-MCNC: 7.4 G/DL (ref 6–8.5)
RBC # BLD AUTO: 3.86 X10E6/UL (ref 3.77–5.28)
SODIUM SERPL-SCNC: 142 MMOL/L (ref 134–144)
WBC # BLD AUTO: 7.6 X10E3/UL (ref 3.4–10.8)

## 2018-01-04 NOTE — PROGRESS NOTES
Caregiver id x 3, notified and verbalized understanding.     Labs faxed to Dr. Rosana Zambrano- [phone]

## 2018-01-04 NOTE — PROGRESS NOTES
Please inform group home that anemia is improving - recheck in 1-2 months to ensure back to normal.   Kidney function also improving. Lithium level at zero. Would they like us to fax labs to psych?    Thanks,  N

## 2018-02-16 ENCOUNTER — OFFICE VISIT (OUTPATIENT)
Dept: FAMILY MEDICINE CLINIC | Age: 33
End: 2018-02-16

## 2018-02-16 VITALS
OXYGEN SATURATION: 99 % | WEIGHT: 138 LBS | BODY MASS INDEX: 23.56 KG/M2 | HEART RATE: 92 BPM | RESPIRATION RATE: 16 BRPM | TEMPERATURE: 97.8 F | HEIGHT: 64 IN | SYSTOLIC BLOOD PRESSURE: 122 MMHG | DIASTOLIC BLOOD PRESSURE: 74 MMHG

## 2018-02-16 DIAGNOSIS — Z11.1 SCREENING FOR TUBERCULOSIS: ICD-10-CM

## 2018-02-16 DIAGNOSIS — Z00.00 ROUTINE GENERAL MEDICAL EXAMINATION AT A HEALTH CARE FACILITY: Primary | ICD-10-CM

## 2018-02-16 DIAGNOSIS — E03.1 CONGENITAL HYPOTHYROIDISM WITHOUT GOITER: ICD-10-CM

## 2018-02-16 DIAGNOSIS — F31.78 BIPOLAR DISORDER, IN FULL REMISSION, MOST RECENT EPISODE MIXED (HCC): ICD-10-CM

## 2018-02-16 DIAGNOSIS — R35.0 URINARY FREQUENCY: ICD-10-CM

## 2018-02-16 PROBLEM — N39.0 UTI (URINARY TRACT INFECTION): Status: RESOLVED | Noted: 2017-12-24 | Resolved: 2018-02-16

## 2018-02-16 PROBLEM — R41.82 ALTERED MENTAL STATUS: Status: RESOLVED | Noted: 2017-12-24 | Resolved: 2018-02-16

## 2018-02-16 PROBLEM — N17.9 ACUTE KIDNEY INJURY (HCC): Status: RESOLVED | Noted: 2017-12-24 | Resolved: 2018-02-16

## 2018-02-16 LAB
BILIRUB UR QL STRIP: NEGATIVE
GLUCOSE UR-MCNC: NEGATIVE MG/DL
KETONES P FAST UR STRIP-MCNC: NEGATIVE MG/DL
PH UR STRIP: 5 [PH] (ref 4.6–8)
PROT UR QL STRIP: NEGATIVE
SP GR UR STRIP: 1 (ref 1–1.03)
UA UROBILINOGEN AMB POC: NORMAL (ref 0.2–1)
URINALYSIS CLARITY POC: CLEAR
URINALYSIS COLOR POC: YELLOW
URINE BLOOD POC: NEGATIVE
URINE LEUKOCYTES POC: NORMAL
URINE NITRITES POC: NEGATIVE

## 2018-02-16 NOTE — PROGRESS NOTES
Rosa Mary is a 29 y.o. female presenting for their annual checkup. Follows a low fat diet?  no.  Dietary recall:  3 meals a day, some fruits and vegetables, drinks mostly water   Follow an exercise program?  Sometimes at day support   Hours of sleep? 8 hrs   Changes in bowel or bladder habits? Urinary frequency - needs to be checked for UTI  Up to date on Tdap (<10 years)? Yes   Feels stable and well emotionally? Yes     Current concerns include: Needs TB testing     Past Medical History:   Diagnosis Date    Bipolar affective ([facility] 11/8/2010    MR (mental retardation), severe     Seizures ([facility]       History reviewed. No pertinent surgical history. Prior to Admission medications    Medication Sig Start Date End Date Taking? Authorizing Provider   docusate sodium (COLACE) 100 mg capsule Take 1 Cap by mouth two (2) times daily as needed for Constipation for up to 90 days. 12/27/17 3/27/18 Yes Lorna Craig MD   divalproex ER (DEPAKOTE ER) 500 mg ER tablet Take 500 mg by mouth two (2) times a day. Yes Historical Provider   risperiDONE (RISPERDAL) 2 mg tablet Take 2 mg by mouth daily. Yes Historical Provider   topiramate (TOPAMAX) 200 mg tablet Take 200 mg by mouth nightly. Yes Historical Provider   fluticasone (FLONASE) 50 mcg/actuation nasal spray 2 Sprays by Both Nostrils route daily. Yes Historical Provider   cetirizine (ZYRTEC) 10 mg tablet Take 1 Tab by mouth daily. 9/15/17  Yes Mars Walton NP   levothyroxine (SYNTHROID) 75 mcg tablet Take 1 po qam. 9/15/17  Yes Mars Walton NP   drospirenone-ethinyl estradiol (ANAYA) 3-0.02 mg tab Take 1 Tab by mouth daily. 9/15/17  Yes Mars Walton NP   hydrOXYzine (VISTARIL) 25 mg capsule Take 25 mg by mouth three (3) times daily as needed for Anxiety (aggression). Yes Historical Provider   zolpidem (AMBIEN) 5 mg tablet Take  by mouth nightly as needed for Sleep.    Yes Historical Provider   traZODone (DESYREL) 100 mg tablet Take 100 mg by mouth nightly. Yes Historical Provider   benztropine (COGENTIN) 1 mg tablet Take 1 Tab by mouth two (2) times a day. 3/27/15  Yes Lawrence Thomas NP   diazepam (VALIUM) 2 mg tablet Take 1 tablet by mouth as needed for Anxiety. Take 1 hr prior to procedure, may repeat 15 min before procedure as well 2/4/15  Yes Lawrence Thomas NP     Allergies   Allergen Reactions    Bactrim [Sulfamethoprim] Other (comments)     Dehydration    Phenobarbital Unknown (comments)     Unable to obtain      Social History   Substance Use Topics    Smoking status: Never Smoker    Smokeless tobacco: Never Used    Alcohol use No      History reviewed. No pertinent family history. Review of Systems -   Psychological ROS: negative  Ophthalmic ROS: negative  ENT ROS: negative  Endocrine ROS: negative  Breast ROS: negative  Respiratory ROS: no cough, shortness of breath, or wheezing  Cardiovascular ROS: no chest pain or dyspnea on exertion  Gastrointestinal ROS: no abdominal pain, change in bowel habits, or black or bloody stools  Genito-Urinary ROS: no dysuria, trouble voiding, or hematuria  Musculoskeletal ROS: negative  Neurological ROS: no TIA or stroke symptoms  Dermatological ROS: negative    Objective:  Visit Vitals    /74    Pulse 92    Temp 97.8 °F (36.6 °C) (Oral)    Resp 16    Ht 5' 4\" (1.626 m)    Wt 138 lb (62.6 kg)    LMP 01/16/2018 (Exact Date)    SpO2 99%    BMI 23.69 kg/m2     The physical exam is generally normal. Patient appears well, alert and oriented x 3, pleasant, cooperative. Vitals are as noted. Neck supple and free of adenopathy, or masses. No thyromegaly. CARLYLE. Ears, throat are normal. Lungs are clear to auscultation. Heart sounds are normal, no murmurs, clicks, gallops or rubs. Abdomen is soft, no tenderness, masses or organomegaly. Breasts: patient declines to have breast exam. Self exam is encouraged. Pelvis: examination not indicated.  Extremities are normal. Peripheral pulses are normal. Screening neurological exam is normal without focal findings. Skin is normal without suspicious lesions noted. Assessment/Plan:  Diagnoses and all orders for this visit:    1. Routine general medical examination at a health care facility  -     CBC WITH AUTOMATED DIFF    2. Urinary frequency  -     AMB POC URINALYSIS DIP STICK AUTO W/O MICRO  -     CULTURE, URINE    3. Screening for tuberculosis  -     QUANTIFERON TB GOLD    4. Congenital hypothyroidism without goiter  -     TSH 3RD GENERATION    5. Bipolar disorder, in full remission, most recent episode [facility])  Managed by psych    Discussed importance of healthy diet and regular exercise, recommended multivitamin and sunscreen usage. Encouraged monthly self breast/testicular exam.      Follow-up Disposition:  Return if symptoms worsen or fail to improve.     Snow Wyatt NP

## 2018-02-16 NOTE — MR AVS SNAPSHOT
[address] [place] [address] 
[phone] Patient: Dhruv Weeks MRN: [id number] SMQ:[id number] Visit Information Date & Time Provider Department Dept. Phone Encounter #  
 2/16/2018  2:00 PM Mili Kaur NP [address] [facility] [phone] [id number] Upcoming Health Maintenance Date Due  
 PAP AKA CERVICAL CYTOLOGY 5/27/2018 DTaP/Tdap/Td series (2 - Td) 2/4/2025 Allergies as of 2/16/2018  Review Complete On: 2/16/2018 By: Rox Huerta LPN Severity Noted Reaction Type Reactions Bactrim [Sulfamethoprim]  01/03/2018    Other (comments) Dehydration Phenobarbital  08/24/2011    Unknown (comments) Unable to obtain Current Immunizations  Reviewed on 2/9/2016 Name Date Influenza Vaccine (Quad) PF 11/13/2014 TB Skin Test (PPD) Intradermal 2/9/2016, 2/4/2014, 2/5/2013 Tdap 2/4/2015 Not reviewed this visit You Were Diagnosed With   
  
 Codes Comments Routine general medical examination at a health care facility    -  Primary ICD-10-CM: Z00.00 ICD-9-CM: V70.0 Urinary frequency     ICD-10-CM: R35.0 ICD-9-CM: 788.41 Screening for tuberculosis     ICD-10-CM: Z11.1 ICD-9-CM: V74.1 Congenital hypothyroidism without goiter     ICD-10-CM: E03.1 ICD-9-CM: 726 Bipolar disorder, in full remission, most recent episode [facility])     ICD-10-CM: F31.78 
ICD-9-CM: 296.66 Vitals BP Pulse Temp Resp Height(growth percentile) Weight(growth percentile) 122/74 92 97.8 °F (36.6 °C) (Oral) 16 5' 4\" (1.626 m) 138 lb (62.6 kg) LMP SpO2 BMI OB Status Smoking Status 01/16/2018 (Exact Date) 99% 23.69 kg/m2 Having regular periods Never Smoker Vitals History BMI and BSA Data Body Mass Index Body Surface Area  
 23.69 kg/m 2 1.68 m 2 Preferred Pharmacy Pharmacy Name Phone [facility] [phone] [phone] Your Updated Medication List  
  
   
This list is accurate as of: 2/16/18  2:36 PM.  Always use your most recent med list.  
  
  
  
  
 benztropine 1 mg tablet Commonly known as:  COGENTIN Take 1 Tab by mouth two (2) times a day. cetirizine 10 mg tablet Commonly known as:  ZYRTEC Take 1 Tab by mouth daily. DEPAKOTE  mg ER tablet Generic drug:  divalproex ER Take 500 mg by mouth two (2) times a day. diazePAM 2 mg tablet Commonly known as:  VALIUM Take 1 tablet by mouth as needed for Anxiety. Take 1 hr prior to procedure, may repeat 15 min before procedure as well  
  
 docusate sodium 100 mg capsule Commonly known as:  Queen Passer Take 1 Cap by mouth two (2) times daily as needed for Constipation for up to 90 days. drospirenone-ethinyl estradiol 3-0.02 mg Tab Commonly known as:  ANAYA Take 1 Tab by mouth daily. FLONASE 50 mcg/actuation nasal spray Generic drug:  fluticasone 2 Sprays by Both Nostrils route daily. hydrOXYzine pamoate 25 mg capsule Commonly known as:  VISTARIL Take 25 mg by mouth three (3) times daily as needed for Anxiety (aggression). levothyroxine 75 mcg tablet Commonly known as:  SYNTHROID Take 1 po qam.  
  
 risperiDONE 2 mg tablet Commonly known as:  RisperDAL Take 2 mg by mouth daily. topiramate 200 mg tablet Commonly known as:  TOPAMAX Take 200 mg by mouth nightly. traZODone 100 mg tablet Commonly known as:  Jj Saima Take 100 mg by mouth nightly. zolpidem 5 mg tablet Commonly known as:  AMBIEN Take  by mouth nightly as needed for Sleep. We Performed the Following AMB POC URINALYSIS DIP STICK AUTO W/O MICRO [72504 CPT(R)] CBC WITH AUTOMATED DIFF [46160 CPT(R)] CULTURE, URINE [id number] CPT(R)] QUANTIFERON TB GOLD [[id number] Custom] TSH 3RD GENERATION [21684 CPT(R)] Patient Instructions Well Visit, Ages 25 to 48: Care Instructions Your Care Instructions Physical exams can help you stay healthy. Your doctor has checked your overall health and may have suggested ways to take good care of yourself. He or she also may have recommended tests. At home, you can help prevent illness with healthy eating, regular exercise, and other steps. Follow-up care is a key part of your treatment and safety. Be sure to make and go to all appointments, and call your doctor if you are having problems. It's also a good idea to know your test results and keep a list of the medicines you take. How can you care for yourself at home? · Reach and stay at a healthy weight. This will lower your risk for many problems, such as obesity, diabetes, heart disease, and high blood pressure. · Get at least 30 minutes of physical activity on most days of the week. Walking is a good choice. You also may want to do other activities, such as running, swimming, cycling, or playing tennis or team sports. Discuss any changes in your exercise program with your doctor. · Do not smoke or allow others to smoke around you. If you need help quitting, talk to your doctor about stop-smoking programs and medicines. These can increase your chances of quitting for good. · Talk to your doctor about whether you have any risk factors for sexually transmitted infections (STIs). Having one sex partner (who does not have STIs and does not have sex with anyone else) is a good way to avoid these infections. · Use birth control if you do not want to have children at this time. Talk with your doctor about the choices available and what might be best for you. · Protect your skin from too much sun. When you're outdoors from 10 a.m. to 4 p.m., stay in the shade or cover up with clothing and a hat with a wide brim. Wear sunglasses that block UV rays.  Even when it's cloudy, put broad-spectrum sunscreen (SPF 30 or higher) on any exposed skin. · See a dentist one or two times a year for checkups and to have your teeth cleaned. · Wear a seat belt in the car. · Drink alcohol in moderation, if at all. That means no more than 2 drinks a day for men and 1 drink a day for women. Follow your doctor's advice about when to have certain tests. These tests can spot problems early. For everyone · Cholesterol. Have the fat (cholesterol) in your blood tested after age 21. Your doctor will tell you how often to have this done based on your age, family history, or other things that can increase your risk for heart disease. · Blood pressure. Have your blood pressure checked during a routine doctor visit. Your doctor will tell you how often to check your blood pressure based on your age, your blood pressure results, and other factors. · Vision. Talk with your doctor about how often to have a glaucoma test. 
· Diabetes. Ask your doctor whether you should have tests for diabetes. · Colon cancer. Have a test for colon cancer at age 48. You may have one of several tests. If you are younger than 48, you may need a test earlier if you have any risk factors. Risk factors include whether you already had a precancerous polyp removed from your colon or whether your parent, brother, sister, or child has had colon cancer. For women · Breast exam and mammogram. Talk to your doctor about when you should have a clinical breast exam and a mammogram. Medical experts differ on whether and how often women under 50 should have these tests. Your doctor can help you decide what is right for you. · Pap test and pelvic exam. Begin Pap tests at age 24. A Pap test is the best way to find cervical cancer. The test often is part of a pelvic exam. Ask how often to have this test. 
· Tests for sexually transmitted infections (STIs).  Ask whether you should have tests for STIs. You may be at risk if you have sex with more than one person, especially if your partners do not wear condoms. For men · Tests for sexually transmitted infections (STIs). Ask whether you should have tests for STIs. You may be at risk if you have sex with more than one person, especially if you do not wear a condom. · Testicular cancer exam. Ask your doctor whether you should check your testicles regularly. · Prostate exam. Talk to your doctor about whether you should have a blood test (called a PSA test) for prostate cancer. Experts differ on whether and when men should have this test. Some experts suggest it if you are older than 39 and are -American or have a father or brother who got prostate cancer when he was younger than 72. When should you call for help? Watch closely for changes in your health, and be sure to contact your doctor if you have any problems or symptoms that concern you. Where can you learn more? Go to http://sherrell-savage.info/. Enter P072 in the search box to learn more about \"Well Visit, Ages 25 to 48: Care Instructions. \" Current as of: May 12, 2017 Content Version: 11.4 © 1017-4823 [employer]. Care instructions adapted under license by [employer] (which disclaims liability or warranty for this information). If you have questions about a medical condition or this instruction, always ask your healthcare professional. [address] any warranty or liability for your use of this information. Introducing [facility] & HEALTH SERVICES! Dear Macario Wyatt: Thank you for requesting a Alerts account. Our records indicate that you have previously registered for a Alerts account but its currently inactive. Please call our [employer] support line at [phone]. Additional Information If you have questions, please visit the Frequently Asked Questions section of the [employer] website at [email or web address] Remember, [employer] is NOT to be used for urgent needs. For medical emergencies, dial 911. Now available from your iPhone and Android! Please provide this summary of care documentation to your next provider. Your primary care clinician is listed as Dominick Moreno. If you have any questions after today's visit, please call [phone].

## 2018-02-16 NOTE — PATIENT INSTRUCTIONS

## 2018-02-16 NOTE — PROGRESS NOTES
1. Have you been to the ER, urgent care clinic since your last visit? Hospitalized since your last visit? No    2. Have you seen or consulted any other health care providers outside of the [address] [place] [address] since your last visit? Include any pap smears or colon screening.  No   Chief Complaint   Patient presents with    Labs     TB, Depakote    Complete Physical

## 2018-02-17 LAB
BASOPHILS # BLD AUTO: 0 X10E3/UL (ref 0–0.2)
BASOPHILS NFR BLD AUTO: 1 %
EOSINOPHIL # BLD AUTO: 0.1 X10E3/UL (ref 0–0.4)
EOSINOPHIL NFR BLD AUTO: 1 %
ERYTHROCYTE [DISTWIDTH] IN BLOOD BY AUTOMATED COUNT: 14 % (ref 12.3–15.4)
HCT VFR BLD AUTO: 34.5 % (ref 34–46.6)
HGB BLD-MCNC: 11.3 G/DL (ref 11.1–15.9)
IMM GRANULOCYTES # BLD: 0 X10E3/UL (ref 0–0.1)
IMM GRANULOCYTES NFR BLD: 0 %
LYMPHOCYTES # BLD AUTO: 2.7 X10E3/UL (ref 0.7–3.1)
LYMPHOCYTES NFR BLD AUTO: 42 %
MCH RBC QN AUTO: 28.5 PG (ref 26.6–33)
MCHC RBC AUTO-ENTMCNC: 32.8 G/DL (ref 31.5–35.7)
MCV RBC AUTO: 87 FL (ref 79–97)
MONOCYTES # BLD AUTO: 0.5 X10E3/UL (ref 0.1–0.9)
MONOCYTES NFR BLD AUTO: 7 %
NEUTROPHILS # BLD AUTO: 3.2 X10E3/UL (ref 1.4–7)
NEUTROPHILS NFR BLD AUTO: 49 %
PLATELET # BLD AUTO: 142 X10E3/UL (ref 150–379)
RBC # BLD AUTO: 3.97 X10E6/UL (ref 3.77–5.28)
TSH SERPL DL<=0.005 MIU/L-ACNC: 0.06 UIU/ML (ref 0.45–4.5)
WBC # BLD AUTO: 6.5 X10E3/UL (ref 3.4–10.8)

## 2018-02-18 LAB — BACTERIA UR CULT: NO GROWTH

## 2018-02-21 LAB
ANNOTATION COMMENT IMP: NORMAL
GAMMA INTERFERON BACKGROUND BLD IA-ACNC: 0.04 IU/ML
M TB IFN-G BLD-IMP: NEGATIVE
M TB IFN-G CD4+ BCKGRND COR BLD-ACNC: 0.01 IU/ML
M TB IFN-G CD4+ T-CELLS BLD-ACNC: 0.05 IU/ML
MITOGEN IGNF BLD-ACNC: 5.11 IU/ML
QUANTIFERON INCUBATION: NORMAL
SERVICE CMNT-IMP: NORMAL

## 2018-02-21 NOTE — PROGRESS NOTES
Please inform caregivers:   1. Urine culture negative for infection  2.  TB test negative  3. Platelets and thyroid levels slightly off - recheck 1-2 mo.    Thanks,  N

## 2018-04-02 ENCOUNTER — OFFICE VISIT (OUTPATIENT)
Dept: FAMILY MEDICINE CLINIC | Age: 33
End: 2018-04-02

## 2018-04-02 VITALS
OXYGEN SATURATION: 98 % | TEMPERATURE: 97.8 F | RESPIRATION RATE: 16 BRPM | HEIGHT: 64 IN | WEIGHT: 128 LBS | BODY MASS INDEX: 21.85 KG/M2

## 2018-04-02 DIAGNOSIS — R82.998 URINE WBC INCREASED: ICD-10-CM

## 2018-04-02 DIAGNOSIS — F69 BEHAVIOR PROBLEM, ADULT: Primary | ICD-10-CM

## 2018-04-02 LAB
BILIRUB UR QL STRIP: NEGATIVE
GLUCOSE UR-MCNC: NEGATIVE MG/DL
KETONES P FAST UR STRIP-MCNC: NEGATIVE MG/DL
PH UR STRIP: 6.5 [PH] (ref 4.6–8)
PROT UR QL STRIP: NEGATIVE
SP GR UR STRIP: 1.01 (ref 1–1.03)
UA UROBILINOGEN AMB POC: NORMAL (ref 0.2–1)
URINALYSIS CLARITY POC: CLEAR
URINALYSIS COLOR POC: YELLOW
URINE BLOOD POC: NEGATIVE
URINE LEUKOCYTES POC: NORMAL
URINE NITRITES POC: NEGATIVE

## 2018-04-02 RX ORDER — NITROFURANTOIN 25; 75 MG/1; MG/1
100 CAPSULE ORAL 2 TIMES DAILY
Qty: 10 CAP | Refills: 0 | Status: SHIPPED | OUTPATIENT
Start: 2018-04-02 | End: 2018-04-07

## 2018-04-02 NOTE — PROGRESS NOTES
1. Have you been to the ER, urgent care clinic since your last visit? Hospitalized since your last visit? No    2. Have you seen or consulted any other health care providers outside of the [address] [place] [address] since your last visit? Include any pap smears or colon screening.  No     Chief Complaint   Patient presents with    Incomplete Bladder Emptying     x 1week    Behavioral Problem     x 1 week

## 2018-04-02 NOTE — MR AVS SNAPSHOT
[address] [address] 
[phone] Patient: Neha Persaud MRN: [id number] BYL:[id number] Visit Information Date & Time Provider Department Dept. Phone Encounter #  
 4/2/2018 10:45 AM Christiano Cardenas NP [address] [phone] [id number] Follow-up Instructions Return if symptoms worsen or fail to improve. Your Appointments 4/27/2018  3:00 PM  
ACUTE CARE with Christiano Cardenas NP [address] [facility] ([facility]) Appt Note: repeat bloodwork  
 [id number] [place] [address] Road [address] [phone]  
  
   
 [address] [address] Upcoming Health Maintenance Date Due  
 PAP AKA CERVICAL CYTOLOGY 5/27/2018 DTaP/Tdap/Td series (2 - Td) 2/4/2025 Allergies as of 4/2/2018  Review Complete On: 4/2/2018 By: Medina Faith LPN Severity Noted Reaction Type Reactions Bactrim [Sulfamethoprim]  01/03/2018    Other (comments) Dehydration Phenobarbital  08/24/2011    Unknown (comments) Unable to obtain Current Immunizations  Reviewed on 2/9/2016 Name Date Influenza Vaccine (Quad) PF 11/13/2014 TB Skin Test (PPD) Intradermal 2/9/2016, 2/4/2014, 2/5/2013 Tdap 2/4/2015 Not reviewed this visit You Were Diagnosed With   
  
 Codes Comments Behavior problem, adult    -  Primary ICD-10-CM: F69 
ICD-9-CM: 312.9 Urine WBC increased     ICD-10-CM: R82.99 
ICD-9-CM: 791.7 Vitals Temp Resp Height(growth percentile) Weight(growth percentile) LMP SpO2  
 97.8 °F (36.6 °C) (Oral) 16 5' 4\" (1.626 m) 128 lb (58.1 kg) 03/01/2018 (Exact Date) 98% BMI OB Status Smoking Status 21.97 kg/m2 Having regular periods Never Smoker Vitals History BMI and BSA Data Body Mass Index Body Surface Area  
 21.97 kg/m 2 1.62 m 2 Preferred Pharmacy Pharmacy Name Phone [phone] [phone] Your Updated Medication List  
  
   
This list is accurate as of 4/2/18 11:03 AM.  Always use your most recent med list.  
  
  
  
  
 benztropine 1 mg tablet Commonly known as:  COGENTIN Take 1 Tab by mouth two (2) times a day. cetirizine 10 mg tablet Commonly known as:  ZYRTEC Take 1 Tab by mouth daily. DEPAKOTE  mg ER tablet Generic drug:  divalproex ER Take 500 mg by mouth two (2) times a day. diazePAM 2 mg tablet Commonly known as:  VALIUM Take 1 tablet by mouth as needed for Anxiety. Take 1 hr prior to procedure, may repeat 15 min before procedure as well  
  
 drospirenone-ethinyl estradiol 3-0.02 mg Tab Commonly known as:  ANAYA Take 1 Tab by mouth daily. FLONASE 50 mcg/actuation nasal spray Generic drug:  fluticasone 2 Sprays by Both Nostrils route daily. hydrOXYzine pamoate 25 mg capsule Commonly known as:  VISTARIL Take 25 mg by mouth three (3) times daily as needed for Anxiety (aggression). levothyroxine 75 mcg tablet Commonly known as:  SYNTHROID Take 1 po qam.  
  
 nitrofurantoin (macrocrystal-monohydrate) 100 mg capsule Commonly known as:  MACROBID Take 1 Cap by mouth two (2) times a day for 5 days. risperiDONE 2 mg tablet Commonly known as:  RisperDAL Take 2 mg by mouth daily. topiramate 200 mg tablet Commonly known as:  TOPAMAX Take 200 mg by mouth nightly. traZODone 100 mg tablet Commonly known as:  Gleda Oir Take 100 mg by mouth nightly. zolpidem 5 mg tablet Commonly known as:  AMBIEN Take  by mouth nightly as needed for Sleep. Prescriptions Sent to Pharmacy Refills  
 nitrofurantoin, macrocrystal-monohydrate, (MACROBID) 100 mg capsule 0 Sig: Take 1 Cap by mouth two (2) times a day for 5 days.   
 Class: Normal  
 Pharmacy: [address] #: [phone] Route: Oral  
  
We Performed the Following AMB POC URINALYSIS DIP STICK AUTO W/O MICRO [36103 CPT(R)] CULTURE, URINE [id number] CPT(R)] Follow-up Instructions Return if symptoms worsen or fail to improve. Patient Instructions Urinary Tract Infection in Women: Care Instructions Your Care Instructions A urinary tract infection, or UTI, is a general term for an infection anywhere between the kidneys and the urethra (where urine comes out). Most UTIs are bladder infections. They often cause pain or burning when you urinate. UTIs are caused by bacteria and can be cured with antibiotics. Be sure to complete your treatment so that the infection goes away. Follow-up care is a key part of your treatment and safety. Be sure to make and go to all appointments, and call your doctor if you are having problems. It's also a good idea to know your test results and keep a list of the medicines you take. How can you care for yourself at home? · Take your antibiotics as directed. Do not stop taking them just because you feel better. You need to take the full course of antibiotics. · Drink extra water and other fluids for the next day or two. This may help wash out the bacteria that are causing the infection. (If you have kidney, heart, or liver disease and have to limit fluids, talk with your doctor before you increase your fluid intake.) · Avoid drinks that are carbonated or have caffeine. They can irritate the bladder. · Urinate often. Try to empty your bladder each time. · To relieve pain, take a hot bath or lay a heating pad set on low over your lower belly or genital area. Never go to sleep with a heating pad in place. To prevent UTIs · Drink plenty of water each day. This helps you urinate often, which clears bacteria from your system.  (If you have kidney, heart, or liver disease and have to limit fluids, talk with your doctor before you increase your fluid intake.) · Urinate when you need to. · Urinate right after you have sex. · Change sanitary pads often. · Avoid douches, bubble baths, feminine hygiene sprays, and other feminine hygiene products that have deodorants. · After going to the bathroom, wipe from front to back. When should you call for help? Call your doctor now or seek immediate medical care if: 
? · Symptoms such as fever, chills, nausea, or vomiting get worse or appear for the first time. ? · You have new pain in your back just below your rib cage. This is called flank pain. ? · There is new blood or pus in your urine. ? · You have any problems with your antibiotic medicine. ? Watch closely for changes in your health, and be sure to contact your doctor if: 
? · You are not getting better after taking an antibiotic for 2 days. ? · Your symptoms go away but then come back. Where can you learn more? Go to http://sherrell-savage.info/. Enter G826 in the search box to learn more about \"Urinary Tract Infection in Women: Care Instructions. \" Current as of: May 12, 2017 Content Version: 11.4 © 3152-3226 [employer]. Care instructions adapted under license by [employer] (which disclaims liability or warranty for this information). If you have questions about a medical condition or this instruction, always ask your healthcare professional. [place] [address] any warranty or liability for your use of this information. Introducing [address]! Dear Loyd Schneider: Thank you for requesting a [employer] account. Our records indicate that you have previously registered for a [employer] account but its currently inactive. Please call our [employer] support line at [phone]. Additional Information If you have questions, please visit the Frequently Asked Questions section of the [employer] website at [email or web address] Remember, [employer] is NOT to be used for urgent needs. For medical emergencies, dial 911. Now available from your iPhone and Android! Please provide this summary of care documentation to your next provider. Your primary care clinician is listed as Sharman Page. If you have any questions after today's visit, please call [phone].

## 2018-04-02 NOTE — PROGRESS NOTES
Chief Complaint   Patient presents with    Incomplete Bladder Emptying     x 1week    Behavioral Problem     x 1 week     she is a 29y.o. year old female who presents for evalution. Pt has been having a lot of behavioral issues for past week, problems with stomach and lot of complaints about private area. Keep screaming at everyone in day support, also a lot of safety concerns - keeps sayings needs to see people to make sure they are safe. No longer on lithium, was changed earlier this month. Caregivers wondering if pt has UTI. Reviewed PmHx, RxHx, FmHx, SocHx, AllgHx and updated and dated in the chart. Review of Systems - negative except as listed above in the HPI    Objective:     Vitals:    04/02/18 1045   Resp: 16   Temp: 97.8 °F (36.6 °C)   TempSrc: Oral   SpO2: 98%   Weight: 128 lb (58.1 kg)   Height: 5' 4\" (1.626 m)     Physical Examination: General appearance - alert, well appearing, and in no distress  Chest - clear to auscultation, no wheezes, rales or rhonchi, symmetric air entry  Heart - normal rate, regular rhythm, normal S1, S2, no murmurs, rubs, clicks or gallops  Abdomen - soft, nontender, nondistended, no masses or organomegaly  no CVA tenderness    Assessment/ Plan:   Diagnoses and all orders for this visit:    1. Behavior problem, adult  -     AMB POC URINALYSIS DIP STICK AUTO W/O MICRO  Could be secondary to infection. 2. Urine WBC increased  -     CULTURE, URINE  -     nitrofurantoin, macrocrystal-monohydrate, (MACROBID) 100 mg capsule; Take 1 Cap by mouth two (2) times a day for 5 days. Pt instructed to take full course of antibiotics and increase water consumption. F/U if no improvement or develops fever/chills/nausea/vomiting. Pt voiced understanding regarding plan of care. Follow-up Disposition:  Return if symptoms worsen or fail to improve. I have discussed the diagnosis with the patient and the intended plan as seen in the above orders.   The patient has received an after-visit summary and questions were answered concerning future plans.      Medication Side Effects and Warnings were discussed with patient    Renetta Rock NP

## 2018-04-02 NOTE — PATIENT INSTRUCTIONS

## 2018-04-02 NOTE — LETTER
4/6/2018 1:26 PM 
 
Ms. Del Real Pollen [address] [place] [address] [facility] [place] [address] Dear Lenox Pollen: Please find your most recent results below. Resulted Orders AMB POC URINALYSIS DIP STICK AUTO W/O MICRO Result Value Ref Range Color (UA POC) Yellow Clarity (UA POC) Clear Glucose (UA POC) Negative Negative Bilirubin (UA POC) Negative Negative Ketones (UA POC) Negative Negative Specific gravity (UA POC) 1.010 1.001 - 1.035 Blood (UA POC) Negative Negative pH (UA POC) 6.5 4.6 - 8.0 Protein (UA POC) Negative Negative Urobilinogen (UA POC) 0.2 mg/dL 0.2 - 1 Nitrites (UA POC) Negative Negative Leukocyte esterase (UA POC) 1+ Negative CULTURE, URINE Result Value Ref Range Urine Culture, Routine Mixed urogenital wiley Less than 10,000 colonies/mL Narrative Performed at:  [address] [place] [address], [address] [facility]  [id number] : Bryson Camacho MD, Phone:  [phone] RECOMMENDATIONS: 
Please inform caregivers that urine culture was negative for infection but I recommend pt finish antibiotics given. Please call me if you have any questions: [phone] Morena Scott NP

## 2018-04-03 LAB — BACTERIA UR CULT: NORMAL

## 2018-04-04 ENCOUNTER — HOSPITAL ENCOUNTER (INPATIENT)
Age: 33
LOS: 1 days | Discharge: HOME | DRG: 251 | End: 2018-04-05
Attending: EMERGENCY MEDICINE | Admitting: FAMILY MEDICINE
Payer: MEDICAID

## 2018-04-04 ENCOUNTER — APPOINTMENT (OUTPATIENT)
Dept: GENERAL RADIOLOGY | Age: 33
DRG: 251 | End: 2018-04-04
Attending: EMERGENCY MEDICINE
Payer: MEDICAID

## 2018-04-04 DIAGNOSIS — R10.84 GENERALIZED ABDOMINAL PAIN: Primary | ICD-10-CM

## 2018-04-04 DIAGNOSIS — D72.829 LEUKOCYTOSIS, UNSPECIFIED TYPE: ICD-10-CM

## 2018-04-04 PROBLEM — R10.9 ABDOMINAL PAIN: Status: ACTIVE | Noted: 2018-04-04

## 2018-04-04 LAB
ALBUMIN SERPL-MCNC: 2.2 G/DL (ref 3.5–5)
ALBUMIN/GLOB SERPL: 0.7 {RATIO} (ref 1.1–2.2)
ALP SERPL-CCNC: 32 U/L (ref 45–117)
ALT SERPL-CCNC: 13 U/L (ref 12–78)
ANION GAP SERPL CALC-SCNC: 11 MMOL/L (ref 5–15)
AST SERPL-CCNC: 13 U/L (ref 15–37)
BASOPHILS # BLD: 0.1 K/UL (ref 0–0.1)
BASOPHILS NFR BLD: 0 % (ref 0–1)
BILIRUB SERPL-MCNC: 0.3 MG/DL (ref 0.2–1)
BUN SERPL-MCNC: 30 MG/DL (ref 6–20)
BUN/CREAT SERPL: 22 (ref 12–20)
CALCIUM SERPL-MCNC: 7.5 MG/DL (ref 8.5–10.1)
CHLORIDE SERPL-SCNC: 115 MMOL/L (ref 97–108)
CO2 SERPL-SCNC: 17 MMOL/L (ref 21–32)
CREAT SERPL-MCNC: 1.36 MG/DL (ref 0.55–1.02)
DIFFERENTIAL METHOD BLD: ABNORMAL
EOSINOPHIL # BLD: 0 K/UL (ref 0–0.4)
EOSINOPHIL NFR BLD: 0 % (ref 0–7)
ERYTHROCYTE [DISTWIDTH] IN BLOOD BY AUTOMATED COUNT: 13 % (ref 11.5–14.5)
GLOBULIN SER CALC-MCNC: 3 G/DL (ref 2–4)
GLUCOSE SERPL-MCNC: 100 MG/DL (ref 65–100)
HCG UR QL: NEGATIVE
HCT VFR BLD AUTO: 33.3 % (ref 35–47)
HGB BLD-MCNC: 10.5 G/DL (ref 11.5–16)
IMM GRANULOCYTES # BLD: 0.3 K/UL (ref 0–0.04)
IMM GRANULOCYTES NFR BLD AUTO: 1 % (ref 0–0.5)
LIPASE SERPL-CCNC: 97 U/L (ref 73–393)
LYMPHOCYTES # BLD: 1.4 K/UL (ref 0.8–3.5)
LYMPHOCYTES NFR BLD: 6 % (ref 12–49)
MCH RBC QN AUTO: 28.4 PG (ref 26–34)
MCHC RBC AUTO-ENTMCNC: 31.5 G/DL (ref 30–36.5)
MCV RBC AUTO: 90 FL (ref 80–99)
MONOCYTES # BLD: 1.7 K/UL (ref 0–1)
MONOCYTES NFR BLD: 7 % (ref 5–13)
NEUTS SEG # BLD: 19.7 K/UL (ref 1.8–8)
NEUTS SEG NFR BLD: 85 % (ref 32–75)
NRBC # BLD: 0 K/UL (ref 0–0.01)
NRBC BLD-RTO: 0 PER 100 WBC
PLATELET # BLD AUTO: 114 K/UL (ref 150–400)
PMV BLD AUTO: 11.8 FL (ref 8.9–12.9)
POTASSIUM SERPL-SCNC: 3.3 MMOL/L (ref 3.5–5.1)
PROT SERPL-MCNC: 5.2 G/DL (ref 6.4–8.2)
RBC # BLD AUTO: 3.7 M/UL (ref 3.8–5.2)
SODIUM SERPL-SCNC: 143 MMOL/L (ref 136–145)
UR CULT HOLD, URHOLD: NORMAL
WBC # BLD AUTO: 23.2 K/UL (ref 3.6–11)

## 2018-04-04 PROCEDURE — 99284 EMERGENCY DEPT VISIT MOD MDM: CPT

## 2018-04-04 PROCEDURE — 51701 INSERT BLADDER CATHETER: CPT

## 2018-04-04 PROCEDURE — 77030011943

## 2018-04-04 PROCEDURE — 74011250636 HC RX REV CODE- 250/636: Performed by: EMERGENCY MEDICINE

## 2018-04-04 PROCEDURE — 96372 THER/PROPH/DIAG INJ SC/IM: CPT

## 2018-04-04 PROCEDURE — 87040 BLOOD CULTURE FOR BACTERIA: CPT | Performed by: EMERGENCY MEDICINE

## 2018-04-04 PROCEDURE — 96360 HYDRATION IV INFUSION INIT: CPT

## 2018-04-04 PROCEDURE — 81025 URINE PREGNANCY TEST: CPT

## 2018-04-04 PROCEDURE — 96361 HYDRATE IV INFUSION ADD-ON: CPT

## 2018-04-04 PROCEDURE — 36415 COLL VENOUS BLD VENIPUNCTURE: CPT | Performed by: EMERGENCY MEDICINE

## 2018-04-04 PROCEDURE — 80053 COMPREHEN METABOLIC PANEL: CPT | Performed by: PHYSICIAN ASSISTANT

## 2018-04-04 PROCEDURE — 85025 COMPLETE CBC W/AUTO DIFF WBC: CPT | Performed by: EMERGENCY MEDICINE

## 2018-04-04 PROCEDURE — 71045 X-RAY EXAM CHEST 1 VIEW: CPT

## 2018-04-04 PROCEDURE — 94761 N-INVAS EAR/PLS OXIMETRY MLT: CPT

## 2018-04-04 PROCEDURE — 87086 URINE CULTURE/COLONY COUNT: CPT

## 2018-04-04 PROCEDURE — 81001 URINALYSIS AUTO W/SCOPE: CPT | Performed by: PHYSICIAN ASSISTANT

## 2018-04-04 PROCEDURE — 83690 ASSAY OF LIPASE: CPT | Performed by: EMERGENCY MEDICINE

## 2018-04-04 PROCEDURE — 65270000029 HC RM PRIVATE

## 2018-04-04 RX ORDER — METRONIDAZOLE 500 MG/100ML
500 INJECTION, SOLUTION INTRAVENOUS EVERY 6 HOURS
Status: DISCONTINUED | OUTPATIENT
Start: 2018-04-05 | End: 2018-04-05

## 2018-04-04 RX ORDER — HEPARIN SODIUM 5000 [USP'U]/ML
5000 INJECTION, SOLUTION INTRAVENOUS; SUBCUTANEOUS EVERY 8 HOURS
Status: DISCONTINUED | OUTPATIENT
Start: 2018-04-04 | End: 2018-04-05 | Stop reason: HOSPADM

## 2018-04-04 RX ORDER — SODIUM CHLORIDE 0.9 % (FLUSH) 0.9 %
5-10 SYRINGE (ML) INJECTION AS NEEDED
Status: DISCONTINUED | OUTPATIENT
Start: 2018-04-04 | End: 2018-04-05 | Stop reason: HOSPADM

## 2018-04-04 RX ORDER — DOCUSATE SODIUM 100 MG/1
100 CAPSULE, LIQUID FILLED ORAL
COMMUNITY
End: 2018-04-09 | Stop reason: DRUGHIGH

## 2018-04-04 RX ORDER — RISPERIDONE 3 MG/1
3 TABLET, FILM COATED ORAL
Status: ON HOLD | COMMUNITY
End: 2022-09-28

## 2018-04-04 RX ORDER — HYDROXYZINE 25 MG/1
25 TABLET, FILM COATED ORAL 2 TIMES DAILY
COMMUNITY

## 2018-04-04 RX ORDER — SODIUM CHLORIDE 0.9 % (FLUSH) 0.9 %
5-10 SYRINGE (ML) INJECTION EVERY 8 HOURS
Status: DISCONTINUED | OUTPATIENT
Start: 2018-04-04 | End: 2018-04-05 | Stop reason: HOSPADM

## 2018-04-04 RX ORDER — SODIUM CHLORIDE 9 MG/ML
125 INJECTION, SOLUTION INTRAVENOUS CONTINUOUS
Status: DISCONTINUED | OUTPATIENT
Start: 2018-04-04 | End: 2018-04-05 | Stop reason: HOSPADM

## 2018-04-04 RX ORDER — LITHIUM CARBONATE 450 MG/1
450 TABLET ORAL 2 TIMES DAILY
COMMUNITY
End: 2018-04-04

## 2018-04-04 RX ORDER — HYDROXYZINE 50 MG/ML
50 INJECTION, SOLUTION INTRAMUSCULAR
Status: COMPLETED | OUTPATIENT
Start: 2018-04-04 | End: 2018-04-04

## 2018-04-04 RX ADMIN — SODIUM CHLORIDE 125 ML/HR: 900 INJECTION, SOLUTION INTRAVENOUS at 22:52

## 2018-04-04 RX ADMIN — Medication 10 ML: at 23:54

## 2018-04-04 RX ADMIN — HYDROXYZINE HYDROCHLORIDE 50 MG: 50 INJECTION, SOLUTION INTRAMUSCULAR at 20:16

## 2018-04-04 RX ADMIN — SODIUM CHLORIDE 1000 ML: 900 INJECTION, SOLUTION INTRAVENOUS at 21:34

## 2018-04-04 RX ADMIN — SODIUM CHLORIDE 1000 ML: 900 INJECTION, SOLUTION INTRAVENOUS at 21:04

## 2018-04-04 NOTE — ED PROVIDER NOTES
HPI Comments: 29 y.o. female with past medical history significant for bipolar affective disorder, severe mental retardation, and seizures who presents from home with chief complaint of abdominal pain. Patient's mother and her caregiver state that patient has been \"yelling\" of abdominal pain and back pain for the past couple of days and \"sleeping all the time\". Caregiver states that patient is normally very quiet and tends to sleep a lot when she is in pain or sick. Mother states that patient's abdomen is distended but reports that patient has been experiencing consistent weight loss since her admission in December (4 months ago) for a UTI. Mother also reports that patient has had a decreased appetite since Sunday (3 days ago), and has not had anything to eat since Monday (2 days ago). She states that patient is \"gagging\" several times but states that patient is not coughing. Patient reportedly has also been having some sinus drainage as well. There are no other acute medical concerns at this time. Full history, physical exam, and ROS unable to be obtained due to:  Mental retardation/DD. Social hx: never smoker, no EtOH use, no drug use  PCP: Sri Horn NP  Review of Records: Patient is a [facility] patient. Admitted from 12/24/17 to 12/27/17 for UTI and AGUS. Creatinine was 3 during this admission. Baseline creatinine is 1.5. Patient had low thyroid levels on 2/16/18. PCP planned to recheck levels in 1-2 months. Note written by Yamel Barroso, as dictated by Jeff Taylor DO 7:31 PM       The history is provided by a parent and a caregiver. No  was used. Past Medical History:   Diagnosis Date    Bipolar affective ([facility] [address] 11/8/2010    MR (mental retardation), severe     Seizures ([facility] [address]        Past Surgical History:   Procedure Laterality Date    HX HEENT Left     muscle tightened         History reviewed. No pertinent family history.     Social History     Social History  Marital status: SINGLE     Spouse name: N/A    Number of children: N/A    Years of education: N/A     Occupational History    Not on file. Social History Main Topics    Smoking status: Never Smoker    Smokeless tobacco: Never Used    Alcohol use No    Drug use: No    Sexual activity: No     Other Topics Concern    Not on file     Social History Narrative         ALLERGIES: Bactrim [sulfamethoprim] and Phenobarbital    Review of Systems   Unable to perform ROS: Other       Vitals:    04/04/18 1708   BP: 96/63   Pulse: 89   Resp: 18   Temp: 99.6 °F (37.6 °C)   SpO2: 96%   Weight: 58.1 kg (128 lb)   Height: 5' 4\" (1.626 m)            Physical Exam   Constitutional: No distress. HENT:   Dry mm   Eyes: Conjunctivae are normal. Pupils are equal, round, and reactive to light. Neck: No tracheal deviation present. Cardiovascular: Normal rate, regular rhythm, normal heart sounds and intact distal pulses. Pulmonary/Chest: Effort normal and breath sounds normal.   Abdominal: Soft. She exhibits no distension. There is no tenderness. Neurological: She is alert. Skin: Skin is warm and dry. She is not diaphoretic. MDM      ED Course       Procedures               Wbc high   Not sure why that is - will try to obtain urine but urine cx yesterday was negative. D/w FP resident - will admit  They are scanning abd/pelvis.   D/w caregiver and mother

## 2018-04-04 NOTE — PROGRESS NOTES
Please inform caregivers that urine culture was negative for infection but I recommend pt finish antibiotics given.    Thanks,  N

## 2018-04-04 NOTE — IP AVS SNAPSHOT
[address] [facility] 
 
 
 [address] [facility] [address] 
[phone] Patient: Tasia Henderson MRN: [id number] SYY:[birth year] About your hospitalization You were admitted on:  April 4, 2018 You last received care in the:  [facility]  MED SURG 2 You were discharged on:  April 5, 2018 Why you were hospitalized Your primary diagnosis was:  Not on File Your diagnoses also included:  Abdominal Pain Follow-up Information Follow up With Details Comments Contact Info Roman Salcedo NP Schedule an appointment as soon as possible for a visit on 4/9/2018 at 1.30 pm, Follow up after hospital discharge [address] [facility] [address] [id number] [phone] Your Scheduled Appointments Monday April 09, 2018  1:30 PM EDT  
ESTABLISHED PATIENT with Roman Salcedo NP [address] ([place]eller) [address] [id number] [phone] Friday April 27, 2018  3:00 PM EDT ACUTE CARE with Roman Salcedo NP [address] ([place]) [address] [address]  
[phone] Discharge Orders None A check marielle indicates which time of day the medication should be taken. My Medications CONTINUE taking these medications Instructions Each Dose to Equal  
 Morning Noon Evening Bedtime  
 benztropine 1 mg tablet Commonly known as:  COGENTIN Your last dose was: Your next dose is: Take 1 Tab by mouth two (2) times a day. 1 mg  
    
   
   
   
  
 cetirizine 10 mg tablet Commonly known as:  ZYRTEC Your last dose was: Your next dose is: Take 1 Tab by mouth daily. 10 mg  
    
   
   
   
  
 DEPAKOTE  mg ER tablet Generic drug:  divalproex ER Your last dose was: Your next dose is: Take 500 mg by mouth three (3) times daily. 500 mg  
    
   
   
   
  
 docusate sodium 100 mg capsule Commonly known as:  Tamia Felt Your last dose was: Your next dose is: Take 100 mg by mouth two (2) times daily as needed for Constipation. 100 mg  
    
   
   
   
  
 drospirenone-ethinyl estradiol 3-0.02 mg Tab Commonly known as:  ANAYA Your last dose was: Your next dose is: Take 1 Tab by mouth daily. 1 Tab FLONASE 50 mcg/actuation nasal spray Generic drug:  fluticasone Your last dose was: Your next dose is: 2 Sprays by Both Nostrils route daily. 2 Spray  
    
   
   
   
  
 hydrOXYzine HCl 25 mg tablet Commonly known as:  ATARAX Your last dose was: Your next dose is: Take 25 mg by mouth two (2) times daily as needed for Anxiety (agitation). 25 mg  
    
   
   
   
  
 levothyroxine 75 mcg tablet Commonly known as:  SYNTHROID Your last dose was: Your next dose is: Take 1 po qam.  
     
   
   
   
  
 nitrofurantoin (macrocrystal-monohydrate) 100 mg capsule Commonly known as:  MACROBID Your last dose was: Your next dose is: Take 1 Cap by mouth two (2) times a day for 5 days. 100 mg  
    
   
   
   
  
 * risperiDONE 2 mg tablet Commonly known as:  RisperDAL Your last dose was: Your next dose is: Take 2 mg by mouth daily. 2 mg * risperiDONE 3 mg tablet Commonly known as:  RisperDAL Your last dose was: Your next dose is: Take 3 mg by mouth nightly. 3 mg  
    
   
   
   
  
 topiramate 200 mg tablet Commonly known as:  TOPAMAX Your last dose was: Your next dose is: Take 200 mg by mouth nightly. 200 mg  
    
   
   
   
  
 traZODone 100 mg tablet Commonly known as:  Genevieve Gordon  
   
 Your last dose was: Your next dose is: Take 200 mg by mouth nightly. 200 mg  
    
   
   
   
  
 zolpidem 5 mg tablet Commonly known as:  AMBIEN Your last dose was: Your next dose is: Take 5 mg by mouth nightly as needed for Sleep.  
 5 mg * Notice: This list has 2 medication(s) that are the same as other medications prescribed for you. Read the directions carefully, and ask your doctor or other care provider to review them with you. Discharge Instructions REHAB / SKILLED NURSING FACILITY DISCHARGE INSTRUCTIONS Yani Montague / [id number] : 1985 Admission date: 2018 Discharge date: 2018 Primary care provider: Todd Davis NP Discharging provider:  Anca Diallo MD  - Family Medicine Resident Dr. Anand Rizvi - Attending, Family Medicine [facility] . . . . . . . . . . . . . . . . . . . . . . . . . . . . . . . . . . . . . . . . . . . . . . . . . . . . . . . . . . . . . . . . . . . . . . [facility] FINAL DIAGNOSES & HOSPITAL COURSE: 
· Abdominal pain with leukocytosis Yani Montague is a 29 y.o. female with known Bipolar affective, severe MR, h/o partial/focal seizure, psychosis and hypothyroidism who presents to the ER from group home complaining  c/o abdominal pain and back pain. Over the last week, Caregiver endorses decreased PO intake, worsening behavior (agitation and yelling), gagging noises (no N/V), and increased fatigue/smonolence. Last BM was  4 days ago. Denies fevers/chills,  CP/SOB, N/V/D and sick contacts. The caregiver and mother deny any concerns about possible pregnancy or STI.   
  
 
HOSPITAL COURSE Abdominal Pain with leukocytosis: On admission, WBC 23.2. UA small LE and 10-20 WBC, no Nitrites. Chest xray showed no acute process.  CT abdomen/pelvis only remarkable for mild b/l hydronephrosis without any evidence of obstruction. Abdominal ultrasound was unremarkable. Patient received IV fluid, Flagyl 500mg x 1 Zosyn x 2 and Vancomycin x 1. On morning of discharge, WBC decreased to 13.3. Chlamydia/Gonorrhea test was collected and result pending. Stable at discharge, with negative abdominal physical exam.  
- Recommend f/u with PCP; need to review urine cx, blood cx, as well as Chlamydia/gonorrhea result.   
   
H/o Seizures- No recent seizure activity. Controlled on home medications of Depakote 500 mg BID, Cogentin 1 mg BID and Topamax 200 mg QHS 
   
Bipolar affective disorder- Stable. Continued home Risperidone 2 mg daily in the am and Risperidone 3mg in PM.  
   
Hypothyroidism- Stable. Continued home Synthroid 75 mcg daily.  
   
Constipation- Continued home Colace 100 mg daily PRN  
 
FOLLOW-UP CARE RECOMMENDATIONS: 
Follow-up Information Follow up With Details Comments Contact Info Melodie South NP Schedule an appointment as soon as possible for a visit on 4/9/2018 at 1.30 pm, Follow up after hospital discharge N [address] [place] [address] 
[phone] It is very important that you keep follow-up appointment(s). Bring discharge papers, medication list (and/or medication bottles) to follow-up appointments for review by outpatient provider(s). FOLLOW-UP TESTS RECOMMENDED:  
-Repeat WBC to ensure resolution of leukocytosis 
-F/u on pending results of: Urine cx, blood cx, and Chlamydia/gonorrhea ONGOING TREATMENT PLAN: No new ongoing treatment PENDING TEST RESULTS: 
At the time of discharge the following test results are still pending: Urine culture, blood culture and Chlamydia/Gonorrhea testing Please review these results as they become available. Specific symptoms to watch for: chest pain, shortness of breath, fever, chills, nausea, vomiting, diarrhea, change in mentation, falling, weakness, bleeding. DIET:  Regular Diet ACTIVITY:  Activity as tolerated WOUND CARE: None needed EQUIPMENT needed:  None INCIDENTAL FINDINGS:  None GOALS OF CARE: 
x  Eventual return to home/independent/assisted living Long term SNF Hospice No rehospitalization Patient condition at discharge:  
Functional status Poor Deconditioned   
x  Independent Cognition 
x  Mental retardation Ally Cea Dementia Catheters/lines (plus indication) Zelaya PICC   
  PEG   
x  None Code status 
x  Full code DNR Virgilio Quick . . . . . . . . . . . . . . . . . . . . . . . . . . . . . . . . . . . . . . . . . . . . . . . . . . . . . . . . . . . . . . . . . . . . . . Nelidaoma Broccoli CHRONIC MEDICAL CONDITIONS: 
Problem List as of 4/5/2018  Date Reviewed: 4/2/2018 Codes Class Noted - Resolved Abdominal pain ICD-10-CM: R10.9 ICD-9-CM: 789.00  4/4/2018 - Present Hypothyroid ICD-10-CM: E03.9 ICD-9-CM: 244.9  1/29/2015 - Present Psychosis ICD-10-CM: F29 
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 
ICD-9-CM: 318.1  11/8/2010 - Present RESOLVED: UTI (urinary tract infection) ICD-10-CM: N39.0 ICD-9-CM: 599.0  12/24/2017 - 2/16/2018 RESOLVED: Altered mental status ICD-10-CM: R41.82 
ICD-9-CM: 780.97  12/24/2017 - 2/16/2018 RESOLVED: Acute kidney injury ([facility] ICD-10-CM: N17.9 ICD-9-CM: 584.9  12/24/2017 - 2/16/2018 Information obtained by :  
I understand that if any problems occur once I am at home I am to contact my physician. I understand and acknowledge receipt of the instructions indicated above. Physician's or R.N.'s Signature                                                                  Date/Time Patient or Representative Signature                                                          Date/Time [employer] Announcement We are excited to announce that we are making your provider's discharge notes available to you in [employer]. You will see these notes when they are completed and signed by the physician that discharged you from your recent hospital stay. If you have any questions or concerns about any information you see in [employer], please call the Health Information Department where you were seen or reach out to your Primary Care Provider for more information about your plan of care. Introducing [facility]! [facility] introduces [employer] patient portal. Now you can access parts of your medical record, email your doctor's office, and request medication refills online. 1. In your internet browser, go to [email or web address]t 2. Click on the First Time User? Click Here link in the Sign In box. You will see the New Member Sign Up page. 3. Enter your [employer] Access Code exactly as it appears below. You will not need to use this code after youve completed the sign-up process. If you do not sign up before the expiration date, you must request a new code. · [employer] Access Code: [email or web address] Expires: 7/3/2018  5:02 PM 
 
4. Enter the last four digits of your Social Security Number (xxxx) and Date of Birth (mm/dd/yyyy) as indicated and click Submit.  You will be taken to the next sign-up page. 5. Create a [employer] Mobilet ID. This will be your [employer] login ID and cannot be changed, so think of one that is secure and easy to remember. 6. Create a [employer] password. You can change your password at any time. 7. Enter your Password Reset Question and Answer. This can be used at a later time if you forget your password. 8. Enter your e-mail address. You will receive e-mail notification when new information is available in [address] 9. Click Sign Up. You can now view and download portions of your medical record. 10. Click the Download Summary menu link to download a portable copy of your medical information. If you have questions, please visit the Frequently Asked Questions section of the [employer] website. Remember, [employer] is NOT to be used for urgent needs. For medical emergencies, dial 911. Now available from your iPhone and Android! Introducing [employer] As a [facility] patient, I wanted to make you aware of our electronic visit tool called [employer]. [facility] 24/7 allows you to connect within minutes with a medical provider 24 hours a day, seven days a week via a mobile device or tablet or logging into a secure website from your computer. You can access [employer] from anywhere in the [place]. A virtual visit might be right for you when you have a simple condition and feel like you just dont want to get out of bed, or cant get away from work for an appointment, when your regular [facility] provider is not available (evenings, weekends or holidays), or when youre out of town and need minor care. Electronic visits cost only $49 and if the [facility] 24/7 provider determines a prescription is needed to treat your condition, one can be electronically transmitted to a nearby pharmacy*. Please take a moment to enroll today if you have not already done so.   The enrollment process is free and takes just a few minutes. To enroll, please download the [employer] 24/7 marcos to your tablet or phone, or visit [email or web address] to enroll on your computer. And, as an [address] [place] [address] patient with a [employer] account, the results of your visits will be scanned into your electronic medical record and your primary care provider will be able to view the scanned results. We urge you to continue to see your regular [employer] provider for your ongoing medical care. And while your primary care provider may not be the one available when you seek a [facility] virtual visit, the peace of mind you get from getting a real diagnosis real time can be priceless. For more information on [employer], view our Frequently Asked Questions (FAQs) at [email or web address]. Sincerely, 
 
Jose M Lugo MD 
Chief Medical Officer [facility] Juani Tom *:  certain medications cannot be prescribed via [employer] Unresulted Labs-Please follow up with your PCP about these lab tests Order Current Status CHLAMYDIA/GC PCR In process CULTURE, URINE In process CULTURE, BLOOD Preliminary result Providers Seen During Your Hospitalization Provider Specialty Primary office phone Yobany Peraza DO Emergency Medicine [phone] Addison Ingram MD Family Practice [phone] Immunizations Administered for This Admission Name Date Influenza Vaccine (Quad) PF 4/5/2018 Your Primary Care Physician (PCP) Primary Care Physician Office Phone Office Fax Kenyatta Harms [phone] You are allergic to the following Allergen Reactions Bactrim (Sulfamethoprim) Other (comments) Dehydration Phenobarbital Unknown (comments) Unable to obtain Recent Documentation Height Weight BMI OB Status Smoking Status 1.626 m 58.1 kg 21.97 kg/m2 Medically Induced Never Smoker Emergency Contacts Name Discharge Info Relation Home Work Mobile [address] CAREGIVER [3] Caregiver [13] [phone] Florencia Sánchez DISCHARGE CAREGIVER [3] Mother [14]   [phone] Patient Belongings The following personal items are in your possession at time of discharge: 
  Dental Appliances: None  Visual Aid: None      Home Medications: None   Jewelry: None  Clothing: Footwear, Pants, Shirt    Other Valuables: None Please provide this summary of care documentation to your next provider. Signatures-by signing, you are acknowledging that this After Visit Summary has been reviewed with you and you have received a copy. Patient Signature:  ____________________________________________________________ Date:  ____________________________________________________________  
  
[facility] Provider Signature:  ____________________________________________________________ Date:  ____________________________________________________________

## 2018-04-05 ENCOUNTER — APPOINTMENT (OUTPATIENT)
Dept: ULTRASOUND IMAGING | Age: 33
DRG: 251 | End: 2018-04-05
Attending: FAMILY MEDICINE
Payer: MEDICAID

## 2018-04-05 ENCOUNTER — APPOINTMENT (OUTPATIENT)
Dept: CT IMAGING | Age: 33
DRG: 251 | End: 2018-04-05
Attending: FAMILY MEDICINE
Payer: MEDICAID

## 2018-04-05 VITALS
OXYGEN SATURATION: 96 % | HEART RATE: 100 BPM | HEIGHT: 64 IN | TEMPERATURE: 98.2 F | DIASTOLIC BLOOD PRESSURE: 60 MMHG | SYSTOLIC BLOOD PRESSURE: 90 MMHG | BODY MASS INDEX: 21.85 KG/M2 | WEIGHT: 128 LBS | RESPIRATION RATE: 18 BRPM

## 2018-04-05 LAB
ALBUMIN SERPL-MCNC: 2.7 G/DL (ref 3.5–5)
ALBUMIN/GLOB SERPL: 0.6 {RATIO} (ref 1.1–2.2)
ALP SERPL-CCNC: 36 U/L (ref 45–117)
ALT SERPL-CCNC: 16 U/L (ref 12–78)
ANION GAP SERPL CALC-SCNC: 8 MMOL/L (ref 5–15)
APPEARANCE UR: ABNORMAL
AST SERPL-CCNC: 14 U/L (ref 15–37)
BACTERIA URNS QL MICRO: NEGATIVE /HPF
BASOPHILS # BLD: 0.1 K/UL (ref 0–0.1)
BASOPHILS NFR BLD: 0 % (ref 0–1)
BILIRUB SERPL-MCNC: 0.4 MG/DL (ref 0.2–1)
BILIRUB UR QL: NEGATIVE
BUN SERPL-MCNC: 26 MG/DL (ref 6–20)
BUN/CREAT SERPL: 17 (ref 12–20)
CALCIUM SERPL-MCNC: 9.3 MG/DL (ref 8.5–10.1)
CHLORIDE SERPL-SCNC: 115 MMOL/L (ref 97–108)
CO2 SERPL-SCNC: 23 MMOL/L (ref 21–32)
COLOR UR: ABNORMAL
CREAT SERPL-MCNC: 1.56 MG/DL (ref 0.55–1.02)
DIFFERENTIAL METHOD BLD: ABNORMAL
EOSINOPHIL # BLD: 0.2 K/UL (ref 0–0.4)
EOSINOPHIL NFR BLD: 1 % (ref 0–7)
EPITH CASTS URNS QL MICRO: ABNORMAL /LPF
ERYTHROCYTE [DISTWIDTH] IN BLOOD BY AUTOMATED COUNT: 13.3 % (ref 11.5–14.5)
GLOBULIN SER CALC-MCNC: 4.3 G/DL (ref 2–4)
GLUCOSE SERPL-MCNC: 93 MG/DL (ref 65–100)
GLUCOSE UR STRIP.AUTO-MCNC: NEGATIVE MG/DL
HCT VFR BLD AUTO: 33.5 % (ref 35–47)
HGB BLD-MCNC: 10.5 G/DL (ref 11.5–16)
HGB UR QL STRIP: ABNORMAL
IMM GRANULOCYTES # BLD: 0.1 K/UL (ref 0–0.04)
IMM GRANULOCYTES NFR BLD AUTO: 1 % (ref 0–0.5)
KETONES UR QL STRIP.AUTO: NEGATIVE MG/DL
LACTATE SERPL-SCNC: 0.5 MMOL/L (ref 0.4–2)
LEUKOCYTE ESTERASE UR QL STRIP.AUTO: ABNORMAL
LYMPHOCYTES # BLD: 2 K/UL (ref 0.8–3.5)
LYMPHOCYTES NFR BLD: 15 % (ref 12–49)
MCH RBC QN AUTO: 28.3 PG (ref 26–34)
MCHC RBC AUTO-ENTMCNC: 31.3 G/DL (ref 30–36.5)
MCV RBC AUTO: 90.3 FL (ref 80–99)
MONOCYTES # BLD: 0.7 K/UL (ref 0–1)
MONOCYTES NFR BLD: 5 % (ref 5–13)
NEUTS SEG # BLD: 10.4 K/UL (ref 1.8–8)
NEUTS SEG NFR BLD: 78 % (ref 32–75)
NITRITE UR QL STRIP.AUTO: NEGATIVE
NRBC # BLD: 0 K/UL (ref 0–0.01)
NRBC BLD-RTO: 0 PER 100 WBC
PH UR STRIP: 6 [PH] (ref 5–8)
PLATELET # BLD AUTO: 121 K/UL (ref 150–400)
PMV BLD AUTO: 11.7 FL (ref 8.9–12.9)
POTASSIUM SERPL-SCNC: 3.9 MMOL/L (ref 3.5–5.1)
PROT SERPL-MCNC: 7 G/DL (ref 6.4–8.2)
PROT UR STRIP-MCNC: 100 MG/DL
RBC # BLD AUTO: 3.71 M/UL (ref 3.8–5.2)
RBC #/AREA URNS HPF: ABNORMAL /HPF (ref 0–5)
SODIUM SERPL-SCNC: 146 MMOL/L (ref 136–145)
SP GR UR REFRACTOMETRY: 1.01 (ref 1–1.03)
TSH SERPL DL<=0.05 MIU/L-ACNC: 0.02 UIU/ML (ref 0.36–3.74)
UROBILINOGEN UR QL STRIP.AUTO: 0.2 EU/DL (ref 0.2–1)
VALPROATE SERPL-MCNC: 84 UG/ML (ref 50–100)
WBC # BLD AUTO: 13.3 K/UL (ref 3.6–11)
WBC URNS QL MICRO: ABNORMAL /HPF (ref 0–4)

## 2018-04-05 PROCEDURE — 74011250636 HC RX REV CODE- 250/636: Performed by: FAMILY MEDICINE

## 2018-04-05 PROCEDURE — 84443 ASSAY THYROID STIM HORMONE: CPT

## 2018-04-05 PROCEDURE — 36415 COLL VENOUS BLD VENIPUNCTURE: CPT

## 2018-04-05 PROCEDURE — 74011250637 HC RX REV CODE- 250/637: Performed by: FAMILY MEDICINE

## 2018-04-05 PROCEDURE — 97165 OT EVAL LOW COMPLEX 30 MIN: CPT

## 2018-04-05 PROCEDURE — 74011000250 HC RX REV CODE- 250: Performed by: FAMILY MEDICINE

## 2018-04-05 PROCEDURE — 76700 US EXAM ABDOM COMPLETE: CPT

## 2018-04-05 PROCEDURE — 74011000258 HC RX REV CODE- 258: Performed by: FAMILY MEDICINE

## 2018-04-05 PROCEDURE — 85025 COMPLETE CBC W/AUTO DIFF WBC: CPT

## 2018-04-05 PROCEDURE — 90686 IIV4 VACC NO PRSV 0.5 ML IM: CPT | Performed by: FAMILY MEDICINE

## 2018-04-05 PROCEDURE — 87491 CHLMYD TRACH DNA AMP PROBE: CPT | Performed by: FAMILY MEDICINE

## 2018-04-05 PROCEDURE — 90471 IMMUNIZATION ADMIN: CPT

## 2018-04-05 PROCEDURE — 74177 CT ABD & PELVIS W/CONTRAST: CPT

## 2018-04-05 PROCEDURE — 83605 ASSAY OF LACTIC ACID: CPT | Performed by: EMERGENCY MEDICINE

## 2018-04-05 PROCEDURE — 74011636320 HC RX REV CODE- 636/320: Performed by: RADIOLOGY

## 2018-04-05 PROCEDURE — 80164 ASSAY DIPROPYLACETIC ACD TOT: CPT | Performed by: EMERGENCY MEDICINE

## 2018-04-05 PROCEDURE — 97161 PT EVAL LOW COMPLEX 20 MIN: CPT

## 2018-04-05 PROCEDURE — 97535 SELF CARE MNGMENT TRAINING: CPT

## 2018-04-05 PROCEDURE — 97116 GAIT TRAINING THERAPY: CPT

## 2018-04-05 PROCEDURE — 80053 COMPREHEN METABOLIC PANEL: CPT

## 2018-04-05 RX ORDER — RISPERIDONE 1 MG/1
2 TABLET, FILM COATED ORAL DAILY
Status: DISCONTINUED | OUTPATIENT
Start: 2018-04-05 | End: 2018-04-05 | Stop reason: HOSPADM

## 2018-04-05 RX ORDER — BENZTROPINE MESYLATE 1 MG/1
1 TABLET ORAL 2 TIMES DAILY
Status: DISCONTINUED | OUTPATIENT
Start: 2018-04-05 | End: 2018-04-05 | Stop reason: HOSPADM

## 2018-04-05 RX ORDER — RISPERIDONE 1 MG/1
3 TABLET, FILM COATED ORAL
Status: DISCONTINUED | OUTPATIENT
Start: 2018-04-05 | End: 2018-04-05 | Stop reason: HOSPADM

## 2018-04-05 RX ORDER — TRAZODONE HYDROCHLORIDE 100 MG/1
200 TABLET ORAL
Status: DISCONTINUED | OUTPATIENT
Start: 2018-04-05 | End: 2018-04-05 | Stop reason: HOSPADM

## 2018-04-05 RX ORDER — LEVOTHYROXINE SODIUM 75 UG/1
75 TABLET ORAL
Status: DISCONTINUED | OUTPATIENT
Start: 2018-04-05 | End: 2018-04-05 | Stop reason: HOSPADM

## 2018-04-05 RX ORDER — PANTOPRAZOLE SODIUM 40 MG/1
40 TABLET, DELAYED RELEASE ORAL
Status: DISCONTINUED | OUTPATIENT
Start: 2018-04-05 | End: 2018-04-05 | Stop reason: HOSPADM

## 2018-04-05 RX ORDER — DOCUSATE SODIUM 100 MG/1
100 CAPSULE, LIQUID FILLED ORAL DAILY
Status: DISCONTINUED | OUTPATIENT
Start: 2018-04-06 | End: 2018-04-05 | Stop reason: HOSPADM

## 2018-04-05 RX ORDER — HYDROXYZINE 25 MG/1
25 TABLET, FILM COATED ORAL
Status: DISCONTINUED | OUTPATIENT
Start: 2018-04-05 | End: 2018-04-05 | Stop reason: HOSPADM

## 2018-04-05 RX ORDER — DOCUSATE SODIUM 100 MG/1
100 CAPSULE, LIQUID FILLED ORAL
Status: DISCONTINUED | OUTPATIENT
Start: 2018-04-05 | End: 2018-04-05

## 2018-04-05 RX ORDER — DIVALPROEX SODIUM 500 MG/1
500 TABLET, EXTENDED RELEASE ORAL 3 TIMES DAILY
Status: DISCONTINUED | OUTPATIENT
Start: 2018-04-05 | End: 2018-04-05 | Stop reason: HOSPADM

## 2018-04-05 RX ORDER — HALOPERIDOL 5 MG/ML
1 INJECTION INTRAMUSCULAR ONCE
Status: COMPLETED | OUTPATIENT
Start: 2018-04-05 | End: 2018-04-05

## 2018-04-05 RX ORDER — POTASSIUM CHLORIDE 7.45 MG/ML
10 INJECTION INTRAVENOUS
Status: COMPLETED | OUTPATIENT
Start: 2018-04-05 | End: 2018-04-05

## 2018-04-05 RX ORDER — TOPIRAMATE 100 MG/1
200 TABLET, FILM COATED ORAL
Status: DISCONTINUED | OUTPATIENT
Start: 2018-04-05 | End: 2018-04-05 | Stop reason: HOSPADM

## 2018-04-05 RX ORDER — CETIRIZINE HCL 10 MG
10 TABLET ORAL DAILY
Status: DISCONTINUED | OUTPATIENT
Start: 2018-04-05 | End: 2018-04-05 | Stop reason: HOSPADM

## 2018-04-05 RX ADMIN — VANCOMYCIN HYDROCHLORIDE 1500 MG: 10 INJECTION, POWDER, LYOPHILIZED, FOR SOLUTION INTRAVENOUS at 03:08

## 2018-04-05 RX ADMIN — LEVOTHYROXINE SODIUM 75 MCG: 75 TABLET ORAL at 09:17

## 2018-04-05 RX ADMIN — POTASSIUM CHLORIDE 10 MEQ: 10 INJECTION, SOLUTION INTRAVENOUS at 09:17

## 2018-04-05 RX ADMIN — HEPARIN SODIUM 5000 UNITS: 5000 INJECTION, SOLUTION INTRAVENOUS; SUBCUTANEOUS at 13:52

## 2018-04-05 RX ADMIN — HEPARIN SODIUM 5000 UNITS: 5000 INJECTION, SOLUTION INTRAVENOUS; SUBCUTANEOUS at 01:21

## 2018-04-05 RX ADMIN — IOPAMIDOL 95 ML: 755 INJECTION, SOLUTION INTRAVENOUS at 00:23

## 2018-04-05 RX ADMIN — HEPARIN SODIUM 5000 UNITS: 5000 INJECTION, SOLUTION INTRAVENOUS; SUBCUTANEOUS at 09:17

## 2018-04-05 RX ADMIN — RISPERIDONE 2 MG: 1 TABLET ORAL at 09:17

## 2018-04-05 RX ADMIN — POTASSIUM CHLORIDE 10 MEQ: 10 INJECTION, SOLUTION INTRAVENOUS at 10:00

## 2018-04-05 RX ADMIN — BENZTROPINE 1 MG: 1 TABLET ORAL at 09:17

## 2018-04-05 RX ADMIN — CETIRIZINE HYDROCHLORIDE 10 MG: 10 TABLET, FILM COATED ORAL at 09:17

## 2018-04-05 RX ADMIN — INFLUENZA VIRUS VACCINE 0.5 ML: 15; 15; 15; 15 SUSPENSION INTRAMUSCULAR at 16:58

## 2018-04-05 RX ADMIN — DIVALPROEX SODIUM 500 MG: 500 TABLET, EXTENDED RELEASE ORAL at 15:32

## 2018-04-05 RX ADMIN — SODIUM CHLORIDE 3.38 G: 900 INJECTION, SOLUTION INTRAVENOUS at 05:15

## 2018-04-05 RX ADMIN — PANTOPRAZOLE SODIUM 40 MG: 40 TABLET, DELAYED RELEASE ORAL at 09:17

## 2018-04-05 RX ADMIN — HALOPERIDOL LACTATE 1 MG: 5 INJECTION, SOLUTION INTRAMUSCULAR at 10:41

## 2018-04-05 RX ADMIN — PIPERACILLIN SODIUM AND TAZOBACTAM SODIUM 3.38 G: 3; .375 INJECTION, POWDER, LYOPHILIZED, FOR SOLUTION INTRAVENOUS at 13:53

## 2018-04-05 RX ADMIN — DIVALPROEX SODIUM 500 MG: 500 TABLET, EXTENDED RELEASE ORAL at 09:17

## 2018-04-05 RX ADMIN — METRONIDAZOLE 500 MG: 500 INJECTION, SOLUTION INTRAVENOUS at 01:22

## 2018-04-05 NOTE — PROGRESS NOTES
Antimicrobial Dosing:    Pharmacy automatically adjusted metronidazole to 500 mg every 12 hours per P&T policy. Patient does not have any of the following exclusions:  -C.  Difficile (patient currently constipated)   -CNS infection  -non-anaerobic infections (giardiasis, trichomonas, H. Pylori, etc)      Thank you,   Yuli Pradhan, PharmD       Contact: [phone]

## 2018-04-05 NOTE — DISCHARGE INSTRUCTIONS
[facility] / Roxanne Signs / [id number] : 1985    Admission date: 2018 Discharge date: 2018       Primary care provider: Sri Horn NP    Discharging provider:  Robert Ramires MD  - Family Medicine Resident  Dr. Natalee Downey - Attending, Family Medicine   . . . . . . . . . . . . . . . . . . . . . . . . . . . . . . . . . . . . . . . . . . . . . . . . . . . . . . . . . . . . . . . . . . . . . . . Virgilio Segovianegro FINAL DIAGNOSES & HOSPITAL COURSE:  · Abdominal pain with leukocytosis      Kate Jordan is a 29 y.o. female with known Bipolar affective, severe MR, h/o partial/focal seizure, psychosis and hypothyroidism who presents to the ER from group home complaining  c/o abdominal pain and back pain. Over the last week, Caregiver endorses decreased PO intake, worsening behavior (agitation and yelling), gagging noises (no N/V), and increased fatigue/smonolence. Last BM was  4 days ago. Denies fevers/chills,  CP/SOB, N/V/D and sick contacts. The caregiver and mother deny any concerns about possible pregnancy or STI.         HOSPITAL COURSE  Abdominal Pain with leukocytosis: On admission, WBC 23.2. UA small LE and 10-20 WBC, no Nitrites. Chest xray showed no acute process. CT abdomen/pelvis only remarkable for mild b/l hydronephrosis without any evidence of obstruction. Abdominal ultrasound was unremarkable. Patient received IV fluid, Flagyl 500mg x 1 Zosyn x 2 and Vancomycin x 1. On morning of discharge, WBC decreased to 13.3. Chlamydia/Gonorrhea test was collected and result pending. Stable at discharge, with negative abdominal physical exam.   - Recommend f/u with PCP; need to review urine cx, blood cx, as well as Chlamydia/gonorrhea result.        H/o Seizures- No recent seizure activity. Controlled on home medications of Depakote 500 mg BID, Cogentin 1 mg BID and Topamax 200 mg QHS      Bipolar affective disorder- Stable.  Continued home Risperidone 2 mg daily in the am and Risperidone 3mg in PM.       Hypothyroidism- Stable. Continued home Synthroid 75 mcg daily.       Constipation- Continued home Colace 100 mg daily PRN     FOLLOW-UP CARE RECOMMENDATIONS:  Follow-up Information     Follow up With Details Comments Contact Info    Tree President, FAROOQ Schedule an appointment as soon as possible for a visit on 4/9/2018 at 1.30 pm, Follow up after hospital discharge [address] [facility] [address] [address] [phone]              It is very important that you keep follow-up appointment(s). Bring discharge papers, medication list (and/or medication bottles) to follow-up appointments for review by outpatient provider(s). FOLLOW-UP TESTS RECOMMENDED:   -Repeat WBC to ensure resolution of leukocytosis  -F/u on pending results of: Urine cx, blood cx, and Chlamydia/gonorrhea    ONGOING TREATMENT PLAN: No new ongoing treatment    PENDING TEST RESULTS:  At the time of discharge the following test results are still pending: Urine culture, blood culture and Chlamydia/Gonorrhea testing  Please review these results as they become available. Specific symptoms to watch for: chest pain, shortness of breath, fever, chills, nausea, vomiting, diarrhea, change in mentation, falling, weakness, bleeding. DIET:  Regular Diet    ACTIVITY:  Activity as tolerated    WOUND CARE: None needed    EQUIPMENT needed:  None    INCIDENTAL FINDINGS:  None    GOALS OF CARE:  x  Eventual return to home/independent/assisted living     Long term SNF      Hospice     No rehospitalization     Patient condition at discharge:   Functional status    Poor      Deconditioned    x  Independent   Cognition  x  Mental retardation     Lucid     Dementia   Catheters/lines (plus indication)    Zelaya     PICC      PEG    x  None   Code status  x  Full code      DNR    . . . . . . . . . . . . . . . . . . . . . . . . . . . . . . . . . . . . . . . . . . . . . . . . . . . . . . Yumiko Love . . . . . . . . . . . . . . . . 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, without mention of intractable epilepsy ICD-10-CM: G40.109  ICD-9-CM: 345.50  8/25/2011 - Present        Bipolar affective ([facility] ICD-10-CM: F31.9  ICD-9-CM: 296.80  11/8/2010 - Present        MR (mental retardation), severe ICD-10-CM: F72  ICD-9-CM: 318.1  11/8/2010 - Present        RESOLVED: UTI (urinary tract infection) ICD-10-CM: N39.0  ICD-9-CM: 599.0  12/24/2017 - 2/16/2018        RESOLVED: Altered mental status ICD-10-CM: R41.82  ICD-9-CM: 780.97  12/24/2017 - 2/16/2018        RESOLVED: Acute kidney injury ([facility] ICD-10-CM: N17.9  ICD-9-CM: 584.9  12/24/2017 - 2/16/2018              Information obtained by :   I understand that if any problems occur once I am at home I am to contact my physician. I understand and acknowledge receipt of the instructions indicated above.                                                                                                                                              Physician's or R.N.'s Signature                                                                  Date/Time                                                                                                                                              Patient or Representative Signature                                                          Date/Time

## 2018-04-05 NOTE — PROGRESS NOTES
Occupational Therapy EVALUATION/discharge  Patient: Ericka Roberts (73 y.o. female)  Date: 4/5/2018  Primary Diagnosis: Abdominal pain        Precautions: fall       ASSESSMENT:   Based on the objective data described below, the patient presents with hospital admission secondary to abdominal pain. Patient with PMH significant for bipolar disorder and cognitive impairment. Patient lives in group home where staff assist as needed. Patient received long sitting in bed and agreeable to OT evaluation. Patient able to don socks in long sitting and move to EOB without assistance. She performs sit to stand and transfer to commode but declines need to void at this time. She is able to stand at sink for hand hygiene with supervision. Patient parents present for eval and report patient at baseline for ADL tasks. Patient left sitting in bedside chair with parents in room, in NAD. Further skilled acute occupational therapy is not indicated at this time. Discharge Recommendations: return to group home   Further Equipment Recommendations for Discharge: none noted       SUBJECTIVE:   Patient stated let's walk .     OBJECTIVE DATA SUMMARY:   HISTORY:   Past Medical History:   Diagnosis Date    Bipolar affective ([facility] [address] 11/8/2010    MR (mental retardation), severe     Seizures ([facility] [address]      Past Surgical History:   Procedure Laterality Date    HX HEENT Left     muscle tightened       Prior Level of Function/Environment/Context:   Occupations in which the patient is/was successful, what are the barriers preventing that success:   Performance Patterns (routines, roles, habits, and rituals):   Personal Interests and/or values:   Expanded or extensive additional review of patient history:     Home Situation  Home Environment: Group home  One/Two Story Residence: Two story  Living Alone: No  Support Systems: Family member(s), Friends \ neighbors  Patient Expects to be Discharged to[de-identified] Group home  Current DME Used/Available at Home: None  [x]  Right hand dominant   []  Left hand dominant    EXAMINATION OF PERFORMANCE DEFICITS:  Cognitive/Behavioral Status:  Neurologic State: Alert;Agitated;Eyes open to stimulus  Orientation Level: Unable to verbalize  Cognition: Decreased attention/concentration;Decreased command following;Poor safety awareness; No command following             Skin: intact as seen    Edema: none noted     Hearing: Auditory  Auditory Impairment: None    Vision/Perceptual:                                     Range of Motion:  AROM: Within functional limits  PROM: Within functional limits                      Strength:  Strength: Within functional limits                Coordination:  Coordination: Within functional limits  Fine Motor Skills-Upper: Left Intact; Right Intact    Gross Motor Skills-Upper: Left Intact; Right Intact    Tone & Sensation:                            Balance:  Sitting: Intact; High guard  Standing: Intact; Without support    Functional Mobility and Transfers for ADLs:  Bed Mobility:  Rolling: Independent  Supine to Sit: Independent  Sit to Supine: Independent  Scooting: Independent    Transfers:  Sit to Stand: Supervision  Stand to Sit: Supervision  Bed to Chair: Supervision  Toilet Transfer : Supervision    ADL Assessment:  Feeding: Supervision    Oral Facial Hygiene/Grooming: Supervision    Bathing: Supervision    Upper Body Dressing: Supervision    Lower Body Dressing: Supervision    Toileting: Supervision                ADL Intervention and task modifications:                                          Therapeutic Exercise:     Functional Measure:  Barthel Index:    Bathin  Bladder: 10  Bowels: 10  Groomin  Dressing: 10  Feedin  Mobility: 10  Stairs: 5  Toilet Use: 5  Transfer (Bed to Chair and Back): 10  Total: 70       Barthel and G-code impairment scale:  Percentage of impairment CH  0% CI  1-19% CJ  20-39% CK  40-59% CL  60-79% CM  80-99% CN  100%   Barthel Score 0-100 100 99-80 79-60 59-40 20-39 1-19   0   Barthel Score 0-20 20 17-19 13-16 9-12 5-8 1-4 0      The Barthel ADL Index: Guidelines  1. The index should be used as a record of what a patient does, not as a record of what a patient could do. 2. The main aim is to establish degree of independence from any help, physical or verbal, however minor and for whatever reason. 3. The need for supervision renders the patient not independent. 4. A patient's performance should be established using the best available evidence. Asking the patient, friends/relatives and nurses are the usual sources, but direct observation and common sense are also important. However direct testing is not needed. 5. Usually the patient's performance over the preceding 24-48 hours is important, but occasionally longer periods will be relevant. 6. Middle categories imply that the patient supplies over 50 per cent of the effort. 7. Use of aids to be independent is allowed. Krysta Henry., Barthel, D.W. ([id number]). Functional evaluation: the Barthel Index. [address] [facility] (14)2. Ml Blandon ladonna NIKIA Loo, Stephanie Ray., Charis Apley., Tonie Ely, [facility] (1999). Measuring the change indisability after inpatient rehabilitation; comparison of the responsiveness of the Barthel Index and Functional [place] Measure. Journal of Neurology, Neurosurgery, and Psychiatry, 66(4), 188-001. Leland Lennox, NYLA.J.NISREEN, SHANNAN Resendez.JODI, & Joseph Mabry M.A. (2004.) Assessment of post-stroke quality of life in cost-effectiveness studies: The usefulness of the Barthel Index and the EuroQoL-5D. Quality of Life Research, 13, 559-93         G codes: In compliance with CMSs Claims Based Outcome Reporting, the following G-code set was chosen for this patient based on their primary functional limitation being treated:     The outcome measure chosen to determine the severity of the functional limitation was the Barthel Index with a score of 70/100 which was correlated with the impairment scale.    ? Self Care:     - CURRENT STATUS: CJ - 20%-39% impaired, limited or restricted    - GOAL STATUS: CJ - 20%-39% impaired, limited or restricted    - D/C STATUS:  CJ - 20%-39% impaired, limited or restricted     Occupational Therapy Evaluation Charge Determination   History Examination Decision-Making   LOW Complexity : Brief history review  MEDIUM Complexity : 3-5 performance deficits relating to physical, cognitive , or psychosocial skils that result in activity limitations and / or participation restrictions MEDIUM Complexity : Patient may present with comorbidities that affect occupational performnce. Miniml to moderate modification of tasks or assistance (eg, physical or verbal ) with assesment(s) is necessary to enable patient to complete evaluation       Based on the above components, the patient evaluation is determined to be of the following complexity level: MEDIUM  Pain:  Pain Scale 1: FACES  Pain Intensity 1: 2  Pain Location 1: Rectal;Buttocks     Pain Description 1: Intermittent     Activity Tolerance:   VSS  Please refer to the flowsheet for vital signs taken during this treatment. After treatment:   [x]  Patient left in no apparent distress sitting up in chair  []  Patient left in no apparent distress in bed  [x]  Call bell left within reach  [x]  Nursing notified  [x]  Caregiver present  []  Bed alarm activated    COMMUNICATION/EDUCATION:   Communication/Collaboration:  [x]      Home safety education was provided and the patient/caregiver indicated understanding. [x]      Patient/family have participated as able and agree with findings and recommendations. []      Patient is unable to participate in plan of care at this time.   Findings and recommendations were discussed with: Physical Therapist and Registered Nurse    Conception Ilya, OTR/L  Time Calculation: 27 mins

## 2018-04-05 NOTE — PROGRESS NOTES
[facility]: MED RECONCILIATION    Comments/Recommendations:    Interview conducted with family/caregivers. Medication list provided. Gets prescriptions at [id number] and caregivers report compliance with medications and last doses given today.  Recommend topiramate and valproic acid levels. Valproic acid level appears to have been ordered already but not topiramate. Medications added:     · Docusate PRN  · Hydroxyzine PRN    Medications removed:    · Lithium carbonate - this was discontinued during last admission Dec 2017    Medications adjusted:    · Depakote  mg - changed from BID to TID (per medication list)  · Risperidone - Patient takes 2 mg PO QAM and 3 mg PO QHS  · Trazodone - increased from 100 mg to 200 mg PO QHS    Information obtained from: patient's family/caregivers, medication list provided    Significant PMH/Disease States:   Past Medical History:   Diagnosis Date    Bipolar affective ([facility] [address] 11/8/2010    MR (mental retardation), severe     Seizures ([facility] [address]      Chief Complaint for this Admission:   Chief Complaint   Patient presents with    Bladder Infection     Allergies: Bactrim [sulfamethoprim] and Phenobarbital    Prior to Admission Medications:     Medication Documentation Review Audit       Reviewed by Tj Chacon PHARMD (Pharmacist) on 04/04/18 at 2256         Medication Sig Documenting Provider Last Dose Status Taking?      benztropine (COGENTIN) 1 mg tablet Take 1 Tab by mouth two (2) times a day. Letty Jackman NP 4/4/2018 am Active Yes    cetirizine (ZYRTEC) 10 mg tablet Take 1 Tab by mouth daily. Letty Jackman NP 4/4/2018 am Active Yes    divalproex ER (DEPAKOTE ER) 500 mg ER tablet Take 500 mg by mouth three (3) times daily. Historical Provider 4/4/2018 am Active Yes             Med Note (Rodrigo Ghotra   Wed Apr 4, 2018 10:51 PM):        docusate sodium (COLACE) 100 mg capsule Take 100 mg by mouth two (2) times daily as needed for Constipation.  Historical Provider Active Yes    drospirenone-ethinyl estradiol (ANAYA) 3-0.02 mg tab Take 1 Tab by mouth daily. Alhaji Rivera NP 4/4/2018 am Active Yes    fluticasone (FLONASE) 50 mcg/actuation nasal spray 2 Sprays by Both Nostrils route daily. Historical Provider 4/4/2018 am Active Yes    hydrOXYzine HCl (ATARAX) 25 mg tablet Take 25 mg by mouth two (2) times daily as needed for Anxiety (agitation). Historical Provider  Active Yes    levothyroxine (SYNTHROID) 75 mcg tablet Take 1 po qam. Alhaji Rivera NP 4/4/2018 am Active Yes    nitrofurantoin, macrocrystal-monohydrate, (MACROBID) 100 mg capsule Take 1 Cap by mouth two (2) times a day for 5 days. Alhaji Rivera NP 4/4/2018 am Active Yes    risperiDONE (RISPERDAL) 2 mg tablet Take 2 mg by mouth daily. Historical Provider 4/4/2018 am Active Yes    risperiDONE (RISPERDAL) 3 mg tablet Take 3 mg by mouth nightly. Historical Provider 4/3/2018 pm Active Yes    topiramate (TOPAMAX) 200 mg tablet Take 200 mg by mouth nightly. Historical Provider 4/3/2018 pm Active Yes    traZODone (DESYREL) 100 mg tablet Take 200 mg by mouth nightly. Historical Provider 4/3/2018 pm Active Yes    zolpidem (AMBIEN) 5 mg tablet Take 5 mg by mouth nightly as needed for Sleep. Historical Provider 4/3/2018 pm Active Yes                  Thank you for the consult,  Rogers Fonseca [facility]

## 2018-04-05 NOTE — H&P
[address]   Admission H&P    Date of admission: 4/4/2018    Patient name: Deepa White  MRN: [id number]  YOB: 1985  Age: 29 y.o. Primary care provider:  Letty Jackman NP     Source of Information: patient, medical records    Chief complaint: Abdominal pain a    History of Present Illness  Eileen Banks is a 29 y.o. female with known Bipolar affective, severe MR, h/o partial/focal seizure, psychosis and hypothyroidism who presents to the ER complaining from group home c/o abdominal pain and back pain. Pt is a poor historian due to severe MR. The patient resides in a group home, and she is accompanied by mom and grouphome caregiver who provide the history. Onset was 1 week ago and worsening over the last few days. Pt was evaluated by PCP Joann Pringle on 3 days ago: Had WBCs and 1+ LE UA and was started on Macrobid. However, Urine cx was neg. At baseline: Pt is very active and is constantly moving around the group home and convertases with selective individuals. Over the last week, Caregiver endorses decreased PO intake, worsening behavior (agitation and yelling), gagging noises (no N/V), and increased fatigue/smonolence. Last BM was  4 days ago. Denies fevers/chills,  CP/SOB, N/V/D and sick contacts. The caregiver and mother deny any concerns about possible pregnancy or STI. In the ER, vital signs were unremarkable: Afebrile (T99.6), normal rate and not tachypnic. Labs were remarkable for WBC 23.2. CXR was neg for any acute process. Treated with 1L of NS, and she was given Vistaril for agitation. Home Medications   Prior to Admission medications    Medication Sig Start Date End Date Taking? Authorizing Provider   risperiDONE (RISPERDAL) 3 mg tablet Take 3 mg by mouth nightly. Yes Historical Provider   docusate sodium (COLACE) 100 mg capsule Take 100 mg by mouth two (2) times daily as needed for Constipation.    Yes Historical Provider hydrOXYzine HCl (ATARAX) 25 mg tablet Take 25 mg by mouth two (2) times daily as needed for Anxiety (agitation). Yes Historical Provider   nitrofurantoin, macrocrystal-monohydrate, (MACROBID) 100 mg capsule Take 1 Cap by mouth two (2) times a day for 5 days. 4/2/18 4/7/18 Yes Roman Salcedo NP   divalproex ER (DEPAKOTE ER) 500 mg ER tablet Take 500 mg by mouth three (3) times daily. Yes Historical Provider   risperiDONE (RISPERDAL) 2 mg tablet Take 2 mg by mouth daily. Yes Historical Provider   topiramate (TOPAMAX) 200 mg tablet Take 200 mg by mouth nightly. Yes Historical Provider   fluticasone (FLONASE) 50 mcg/actuation nasal spray 2 Sprays by Both Nostrils route daily. Yes Historical Provider   cetirizine (ZYRTEC) 10 mg tablet Take 1 Tab by mouth daily. 9/15/17  Yes Roman Salcedo NP   levothyroxine (SYNTHROID) 75 mcg tablet Take 1 po qam. 9/15/17  Yes Roman Salcedo NP   drospirenone-ethinyl estradiol (ANAYA) 3-0.02 mg tab Take 1 Tab by mouth daily. 9/15/17  Yes Roman Salcedo NP   zolpidem (AMBIEN) 5 mg tablet Take 5 mg by mouth nightly as needed for Sleep. Yes Historical Provider   traZODone (DESYREL) 100 mg tablet Take 200 mg by mouth nightly. Yes Historical Provider   benztropine (COGENTIN) 1 mg tablet Take 1 Tab by mouth two (2) times a day. 3/27/15  Yes Roman Salcedo NP       Allergies   Allergies   Allergen Reactions    Bactrim [Sulfamethoprim] Other (comments)     Dehydration    Phenobarbital Unknown (comments)     Unable to obtain       Past Medical History:   Diagnosis Date    Bipolar affective ([address] 11/8/2010    MR (mental retardation), severe     Seizures ([facility] [address]        Past Surgical History:   Procedure Laterality Date    HX HEENT Left     muscle tightened       History reviewed. No pertinent family history.     Social History   Patient resides    Independently      With family care    X  Assisted living: Group Home     SNF      Ambulates  X  Independently      With cane Assisted walker           Alcohol history   X  None     Social     Chronic     Smoking history  X  None     Former smoker     Current smoker     History   Smoking Status    Never Smoker   Smokeless Tobacco    Never Used     Code status  X  Full code     DNR/DNI     Partial    Code status discussed with the patient/caregivers. Review of Systems  See HPI    Physical Exam  Visit Vitals    BP 96/63 (BP 1 Location: Right arm, BP Patient Position: Sitting)    Pulse 89    Temp 99.6 °F (37.6 °C)    Resp 18    Ht 5' 4\" (1.626 m)    Wt 128 lb (58.1 kg)    SpO2 96%    BMI 21.97 kg/m2        General: No acute distress. Alert. Cooperative. Head: Normocephalic. Atraumatic. Eyes:  Conjunctiva pink. Sclera white. PERRL. Ears:  Ear canals patent. Nose:  Septum midline. Mucosa pink. No drainage. Throat: Mucosa pink. Moist mucous membranes. Neck: Supple. Normal ROM. No stiffness. Respiratory: CTAB. No w/r/r/c.   Cardiovascular: RRR. Normal S1,S2. No m/r/g. Pulses 2+ throughout. GI: + bowel sounds. RLQ tenderness >LLQ tenderness. No guarding or rebound. Extremities: No edema. No palpable cord. No tenderness. Musculoskeletal: Moves extremities spontaneously. Neuro: Pt not cooperative. Skin: Clear. No rashes. No ulcers.     : Deferred   Rectal: Deferred       Laboratory Data  Recent Results (from the past 24 hour(s))   METABOLIC PANEL, COMPREHENSIVE    Collection Time: 04/04/18  8:57 PM   Result Value Ref Range    Sodium 143 136 - 145 mmol/L    Potassium 3.3 (L) 3.5 - 5.1 mmol/L    Chloride 115 (H) 97 - 108 mmol/L    CO2 17 (L) 21 - 32 mmol/L    Anion gap 11 5 - 15 mmol/L    Glucose 100 65 - 100 mg/dL    BUN 30 (H) 6 - 20 MG/DL    Creatinine 1.36 (H) 0.55 - 1.02 MG/DL    BUN/Creatinine ratio 22 (H) 12 - 20      GFR est AA 55 (L) >60 ml/min/1.73m2    GFR est non-AA 45 (L) >60 ml/min/1.73m2    Calcium 7.5 (L) 8.5 - 10.1 MG/DL    Bilirubin, total 0.3 0.2 - 1.0 MG/DL    ALT (SGPT) 13 12 - 78 U/L AST (SGOT) 13 (L) 15 - 37 U/L    Alk. phosphatase 32 (L) 45 - 117 U/L    Protein, total 5.2 (L) 6.4 - 8.2 g/dL    Albumin 2.2 (L) 3.5 - 5.0 g/dL    Globulin 3.0 2.0 - 4.0 g/dL    A-G Ratio 0.7 (L) 1.1 - 2.2     LIPASE    Collection Time: 04/04/18  9:58 PM   Result Value Ref Range    Lipase 97 73 - 393 U/L   CBC WITH AUTOMATED DIFF    Collection Time: 04/04/18  9:58 PM   Result Value Ref Range    WBC 23.2 (H) 3.6 - 11.0 K/uL    RBC 3.70 (L) 3.80 - 5.20 M/uL    HGB 10.5 (L) 11.5 - 16.0 g/dL    HCT 33.3 (L) 35.0 - 47.0 %    MCV 90.0 80.0 - 99.0 FL    MCH 28.4 26.0 - 34.0 PG    MCHC 31.5 30.0 - 36.5 g/dL    RDW 13.0 11.5 - 14.5 %    PLATELET 223 (L) 133 - 400 K/uL    MPV 11.8 8.9 - 12.9 FL    NRBC 0.0 0  WBC    ABSOLUTE NRBC 0.00 0.00 - 0.01 K/uL    NEUTROPHILS 85 (H) 32 - 75 %    LYMPHOCYTES 6 (L) 12 - 49 %    MONOCYTES 7 5 - 13 %    EOSINOPHILS 0 0 - 7 %    BASOPHILS 0 0 - 1 %    IMMATURE GRANULOCYTES 1 (H) 0.0 - 0.5 %    ABS. NEUTROPHILS 19.7 (H) 1.8 - 8.0 K/UL    ABS. LYMPHOCYTES 1.4 0.8 - 3.5 K/UL    ABS. MONOCYTES 1.7 (H) 0.0 - 1.0 K/UL    ABS. EOSINOPHILS 0.0 0.0 - 0.4 K/UL    ABS. BASOPHILS 0.1 0.0 - 0.1 K/UL    ABS. IMM. GRANS. 0.3 (H) 0.00 - 0.04 K/UL    DF AUTOMATED     URINALYSIS W/MICROSCOPIC    Collection Time: 04/04/18 11:40 PM   Result Value Ref Range    Color YELLOW/STRAW      Appearance CLOUDY (A) CLEAR      Specific gravity 1.006 1.003 - 1.030      pH (UA) 6.0 5.0 - 8.0      Protein 100 (A) NEG mg/dL    Glucose NEGATIVE  NEG mg/dL    Ketone NEGATIVE  NEG mg/dL    Bilirubin NEGATIVE  NEG      Blood SMALL (A) NEG      Urobilinogen 0.2 0.2 - 1.0 EU/dL    Nitrites NEGATIVE  NEG      Leukocyte Esterase SMALL (A) NEG      WBC 10-20 0 - 4 /hpf    RBC 0-5 0 - 5 /hpf    Epithelial cells FEW FEW /lpf    Bacteria NEGATIVE  NEG /hpf   URINE CULTURE HOLD SAMPLE    Collection Time: 04/04/18 11:40 PM   Result Value Ref Range    Urine culture hold        URINE ON HOLD IN MICROBIOLOGY DEPT FOR 3 DAYS.  IF UNPRESERVED URINE IS SUBMITTED, IT CANNOT BE USED FOR ADDITIONAL TESTING AFTER 24 HRS, RECOLLECTION WILL BE REQUIRED. HCG URINE, QL. - POC    Collection Time: 04/04/18 11:48 PM   Result Value Ref Range    Pregnancy test,urine (POC) NEGATIVE  NEG         Imaging  CXR: No acute process      Assessment and Minal Curry is a 29 y.o. female with known Bipolar disorder, severe MR, h/o partial/focal seizure, psychosis and hypothyroidism who is admitted for abdominal pain and leukocytosis. Leukocytosis in the setting of Abdominal Pain: WBC POA 23.2 UA small LE and 10-20 WBC, no Nitrites. Chest xray showed no acute process. Unclear infectious source at this time: possible GI source vs. GYN. Concerning for GI infection with markedly elevated WBC. 1 week onset of abd pain and worsening over last 2-3 days. Report anorexia but no N/V/D. S/p 2 NS boluses in the ED.  - f/u CT abd/pelv with contrast  - f/u LA  - Abd: Board coverage with Vanc, Zosyn and Flagyl (good intraabdominal infection coverage and for pelvic infection)  - Will consider GYN etiology: Nuswab testing  - Continue NS at maintenance  - F/u urine cx and blood cx.    - NPO for now      H/o Seizures- No recent seizure activity. Controlled on home medications of Depakote 500 mg BID, Cogentin 1 mg BID and Topamax 200 mg QHS       Bipolar affective disorder-  Was discontinued on Lithium previous admission in 12/2017. Currently taking Risperidone 2 mg daily in the am and Risperidone 3mg in PM  - Continue home medications       Hypothyroidism- Last TSH in 02/2018 was 0.060.   - Continue home Synthroid 75 mcg daily.       Constipation- Has a h/o constipation treated with Colace prn   - Continue home Colace 100 mg daily PRN       FEN/GI - NS at 125 mL/hr.  NPO currently   Activity - As tolerated  DVT prophylaxis - Heparin  GI prophylaxis - None  Disposition - TBD    CODE STATUS:  FULL CODE       Patient to be discussed with Dr. Juliana Norman, MD  [facility] Problems  Date Reviewed: 4/2/2018          Codes Class Noted POA    Abdominal pain ICD-10-CM: R10.9  ICD-9-CM: 789.00  4/4/2018 Unknown

## 2018-04-05 NOTE — PROGRESS NOTES
4/5/2018   CARE MANAGEMENT NOTE:  CM is following pt for initial discharge planning. EMR reviewed and noted that pt is with diagnoses of severe MR, and Bipolar disorder so she is a poor historian. Per chart hx, pt is a resident of [facility] ([phone]). PTA, pt was ambulatory without any assistive device but she requires assistance for completion of ADLs. Pt's mother has been involved and she transported pt upon previous hospital discharge. PCP is Dr. Abbey Bowles. CM notified Nurse Navigator, Kahlil scott of pt's hospital admission. Will continue to follow and will assist with any post discharge needs.    Nell

## 2018-04-05 NOTE — ED NOTES
TRANSFER - OUT REPORT:    Verbal report given to Shannan Villeda RN(name) on Dustin Cintron  being transferred to [facility](unit) for routine progression of care       Report consisted of patients Situation, Background, Assessment and   Recommendations(SBAR). Information from the following report(s) SBAR, Kardex, ED Summary, [facility] and Recent Results was reviewed with the receiving nurse. Lines:   Peripheral IV 04/04/18 Right Forearm (Active)   Site Assessment Clean, dry, & intact 4/4/2018  9:02 PM   Phlebitis Assessment 0 4/4/2018  9:02 PM   Infiltration Assessment 0 4/4/2018  9:02 PM   Dressing Status Clean, dry, & intact 4/4/2018  9:02 PM   Hub Color/Line Status Blue 4/4/2018  9:02 PM        Opportunity for questions and clarification was provided.       Patient transported with:   [facility]
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
## 2018-04-05 NOTE — ROUTINE PROCESS
Primary Nurse Valery Candelario and Siena Swanson, HUMPHREY performed a dual skin assessment on this patient No impairment noted  Juan score is 21

## 2018-04-05 NOTE — PROGRESS NOTES
[facility] Dosing Services: Antimicrobial Stewardship Progress Note    Consult for antibiotic dosing of Vancomycin by Dr. Refugio Connelly  Pharmacist reviewed antibiotic appropriateness for 29year old , female  for indication of leucocytosis, unknown source  Day of Therapy 1    Plan:  Vancomycin therapy:  Start Vancomycin therapy, with loading dose of 1500 (mg) at 0100 4/5/18. Follow with maintenance dose of 750(mg) at 1300 4/5/18 every 12 hours. Dose calculated to approximate a therapeutic trough of 15-20 mcg/mL. Last trough level / Plan for level:   Pharmacy to follow daily and will make changes to dose and/or frequency based on clinical status. Non-Kinetic Antimicrobial Dosing:   Current Regimen:    Recommendation:     Other Antimicrobial  (not dosed by pharmacist)   Zosyn, Metronidazole   Cultures        Serum Creatinine     Lab Results   Component Value Date/Time    Creatinine 1.36 (H) 04/04/2018 08:57 PM       Creatinine Clearance Estimated Creatinine Clearance: 51.3 mL/min (based on Cr of 1.36).      Temp   99.6 °F (37.6 °C)    WBC   Lab Results   Component Value Date/Time    WBC 23.2 (H) 04/04/2018 09:58 PM       H/H   Lab Results   Component Value Date/Time    HGB 10.5 (L) 04/04/2018 09:58 PM        Platelets   Lab Results   Component Value Date/Time    PLATELET 764 (L) 34/07/2041 09:58 PM          Pharmacist: Glenda information: [phone]

## 2018-04-05 NOTE — PROGRESS NOTES
physical Therapy EVALUATION/DISCHARGE  Patient: Deepa White (5 y.o. female)  Date: 4/5/2018  Primary Diagnosis: Abdominal pain             ASSESSMENT :  Based on the objective data described above, the patient presents with independent with ambulation without assistive device and independent with all functional mobility. Patient was here few months ago and still at her baseline was in group home with supervision. Mom and Dad in the room during the entire therapy evaluation and agreed patient at her baseline. Reviewed all safety precaution and home exercise program with the patient, verbalized understanding, clear to go home per Physical Therapy perspective. Skilled physical therapy is not indicated at this time. PLAN :  Discharge Recommendations: back to group home  Further Equipment Recommendations for Discharge: none     SUBJECTIVE:   Patient stated Ok .     OBJECTIVE DATA SUMMARY:   HISTORY:    Past Medical History:   Diagnosis Date    Bipolar affective ([facility] [address] 11/8/2010    MR (mental retardation), severe     Seizures ([facility] [address]      Past Surgical History:   Procedure Laterality Date    HX HEENT Left     muscle tightened     Prior Level of Function/Home Situation: supervision at group home, ambulate without assistive device  Personal factors and/or comorbidities impacting plan of care:     Home Situation  Home Environment: Group home  One/Two Story Residence: Two story  Living Alone: No  Support Systems: Family member(s), Friends \ neighbors  Patient Expects to be Discharged to[de-identified] Group home  Current DME Used/Available at Home: None    EXAMINATION/PRESENTATION/DECISION MAKING:   Critical Behavior:  Neurologic State: Alert, Agitated, Eyes open to stimulus  Orientation Level: Unable to verbalize  Cognition: Decreased attention/concentration, Decreased command following, Poor safety awareness, No command following     Hearing:   Auditory  Auditory Impairment: None    Range Of Motion:  AROM: Within functional limits           PROM: Within functional limits           Strength:    Strength: Within functional limits                    Tone & Sensation:                                  Coordination:  Coordination: Within functional limits  Vision:      Functional Mobility:  Bed Mobility:  Rolling: Independent  Supine to Sit: Independent  Sit to Supine: Independent  Scooting: Independent  Transfers:  Sit to Stand: Supervision  Stand to Sit: Supervision  Stand Pivot Transfers: Supervision     Bed to Chair: Supervision              Balance:   Sitting: Intact; High guard  Standing: Intact; Without support  Ambulation/Gait Training:  Distance (ft): 200 Feet (ft)     Ambulation - Level of Assistance: Supervision     Gait Description (WDL): Within defined limits                                 Therapeutic Exercises:    Instructed patient to continue active range of motion exercise on both legs while up on chair or on bed. Functional Measure:  Tinetti test:    Sitting Balance: 1  Arises: 2  Attempts to Rise: 2  Immediate Standing Balance: 2  Standing Balance: 2  Nudged: 2  Eyes Closed: 1  Turn 360 Degrees - Continuous/Discontinuous: 1  Turn 360 Degrees - Steady/Unsteady: 1  Sitting Down: 2  Balance Score: 16  Indication of Gait: 1  R Step Length/Height: 1  L Step Length/Height: 1  R Foot Clearance: 1  L Foot Clearance: 1  Step Symmetry: 1  Step Continuity: 1  Path: 2  Trunk: 2  Walking Time: 0  Gait Score: 11  Total Score: 27       Tinetti Test and G-code impairment scale:  Percentage of Impairment CH    0%   CI    1-19% CJ    20-39% CK    40-59% CL    60-79% CM    80-99% CN     100%   Tinetti  Score 0-28 28 23-27 17-22 12-16 6-11 1-5 0       Tinetti Tool Score Risk of Falls  <19 = High Fall Risk  19-24 = Moderate Fall Risk  25-28 = Low Fall Risk  Tinetti ME. Performance-Oriented Assessment of Mobility Problems in Elderly Patients. Han 66; [id number].  (Scoring Description: PT Bulletin Feb. 10, 1993)    Older adults: Agusto Damico al, 2009; n = 1000 [facility] elderly evaluated with ABC, SIMIN, ADL, and IADL)  · Mean SIMIN score for males aged 69-68 years = 26.21(3.40)  · Mean SIMIN score for females age 69-68 years = 25.16(4.30)  · Mean SIMIN score for males over 80 years = 23.29(6.02)  · Mean SIMIN score for females over 80 years = 17.20(8.32)       G codes: In compliance with CMSs Claims Based Outcome Reporting, the following G-code set was chosen for this patient based on their primary functional limitation being treated: The outcome measure chosen to determine the severity of the functional limitation was the tinetti with a score of 27/28 which was correlated with the impairment scale. ? Mobility - Walking and Moving Around:     - CURRENT STATUS: CI - 1%-19% impaired, limited or restricted    - GOAL STATUS: CI - 1%-19% impaired, limited or restricted    - D/C STATUS:  CI - 1%-19% impaired, limited or restricted        Physical Therapy Evaluation Charge Determination   History Examination Presentation Decision-Making   LOW Complexity : Zero comorbidities / personal factors that will impact the outcome / POC LOW Complexity : 1-2 Standardized tests and measures addressing body structure, function, activity limitation and / or participation in recreation  LOW Complexity : Stable, uncomplicated  Other outcome measures tinetti  LOW       Based on the above components, the patient evaluation is determined to be of the following complexity level: LOW     Pain:  Pain Scale 1: FACES  Pain Intensity 1: 2  Pain Location 1: Rectal;Buttocks  Activity Tolerance:   Good. Please refer to the flowsheet for vital signs taken during this treatment.   After treatment:   [x]   Patient left in no apparent distress sitting up in chair  []   Patient left in no apparent distress in bed  [x]   Call bell left within reach  [x]   Nursing notified  [x]   Caregiver present  []   Bed alarm activated    COMMUNICATION/EDUCATION: Communication/Collaboration:  [x]   Fall prevention education was provided and the patient/caregiver indicated understanding. [x]   Patient/family have participated as able and agree with findings and recommendations. []   Patient is unable to participate in plan of care at this time. Findings and recommendations were discussed with: Occupational Therapist, Registered Nurse and patient    Thank you for this referral.  Farzad Reed, PT,WCC.    Time Calculation: 26 mins

## 2018-04-05 NOTE — DISCHARGE SUMMARY
[id number] PROGRAM   Discharge    Date: April 5, 2018    Lenore Elaine  MRN: [id number]  YOB: 1985  Age: 29 y.o. Date of admission: 4/4/2018     Date of discharge/transfer: 4/5/2018    Primary Care Physician: Arminda Bender NP     Attending physician at admission: Dr. Fernie Salinas     Attending physician at discharge: Dr. Dr. Fernie Salinas     Resident physician at discharge: Alexander Moore MD     Consultants during hospitalization  None    Admission diagnoses   Abdominal pain    Discharge diagnoses  Hospital Problems  Date Reviewed: 4/2/2018          Codes Class Noted POA    Abdominal pain ICD-10-CM: R10.9  ICD-9-CM: 789.00  4/4/2018 Unknown               History of Present Illness  Per admitting physician, Dr. Tahira Garcia, \"Eileen Dorsey is a 29 y.o. female with known Bipolar affective, severe MR, h/o partial/focal seizure, psychosis and hypothyroidism who presents to the ER complaining from group home c/o abdominal pain and back pain. Pt is a poor historian due to severe MR. The patient resides in a group home, and she is [facility] and [facility] who provide the history. Onset was 1 week ago and worsening over the last few days. Pt was evaluated by PCP Page Chan on 3 days ago: Had WBCs and 1+ LE UA and was started on Macrobid. However, Urine cx was neg. At baseline: Pt is very active and is constantly moving around the group home and convertases with selective individuals. Over the last week, Caregiver endorses decreased PO intake, worsening behavior (agitation and yelling), gagging noises (no N/V), and increased fatigue/smonolence. Last BM was  4 days ago. Denies fevers/chills,  CP/SOB, N/V/D and sick contacts. The caregiver and mother deny any concerns about possible pregnancy or STI.       In the ER, vital signs were unremarkable: Afebrile (T99.6), normal rate and not tachypnic. Labs were remarkable for WBC 23.2.  CXR was neg for any acute process. Treated with 1L of NS, and she was given Vistaril for agitation. \"    Hospital course  Leukocytosis in the setting of Abdominal Pain: POA WBC 23.2 UA small LE and 10-20 WBC, no Nitrites. Chest xray showed no acute process. CT abdomen/pelvis only remarkable for mild b/l hydronephrosis without any evidence of obstruction. Abdominal ultrasound was unremarkable. Patient received IVF, Flagyl 500mg x 1 Zosyn x 2 and Vancomycin x 1. On morning of discharge, WBC decreased to 13.3. Chlamydia/Gonorrhea test was ordered and result pending at discharge; potential source of abdominal pain could be of gynecological etiology. Stable at discharge, with unremarkable abdominal exam.  - Recommend f/u with PCP; need to review urine cx, blood cx, as well as Chlamydia/gonorrhea result.        H/o Seizures- No recent seizure activity. Controlled on home medications of Depakote 500 mg BID, Cogentin 1 mg BID and Topamax 200 mg QHS      Bipolar affective disorder-  Stable. Lithium discontinued at previous admission on 12/2017. Continued current home Risperidone 2 mg daily in the am and Risperidone 3mg in PM.       Hypothyroidism- Stable. Last TSH in 02/2018 was 0.060. Continued home Synthroid 75 mcg daily.       Constipation- Has a h/o constipation treated with Colace prn. Continued home Colace 100 mg daily PRN while inpatient. Physical exam at discharge:   General: No acute distress. Alert. Cooperative. Head: Normocephalic. Atraumatic. Eyes:              Conjunctiva pink. Sclera white. PERRL. Ears:              Ear canals patent. Nose:             Septum midline. Mucosa pink. No drainage. Throat: Mucosa pink. Moist mucous membranes. Neck: Supple. Normal ROM. No stiffness. Respiratory: CTAB. No w/r/r/c.   Cardiovascular: RRR. Normal S1,S2. No m/r/g. Pulses 2+ throughout. GI: + bowel sounds. No tenderness on palpation. No guarding or rebound. Extremities: No edema. No palpable cord.  No tenderness. Musculoskeletal: Moves extremities spontaneously. Neuro: Pt not cooperative. Skin: Clear. No rashes. No ulcers. : Deferred   Rectal: Deferred         Condition at discharge: Stable    Labs  Recent Labs      04/05/18 0944 04/04/18 2158   WBC  13.3*  23.2*   HGB  10.5*  10.5*   HCT  33.5*  33.3*   PLT  121*  114*     Recent Labs      04/05/18 0944 04/04/18 2057   NA  146*  143   K  3.9  3.3*   CL  115*  115*   CO2  23  17*   BUN  26*  30*   CREA  1.56*  1.36*   GLU  93  100   CA  9.3  7.5*     Recent Labs      04/05/18 0944 04/04/18 2158 04/04/18 2057   SGOT  14*   --   13*   ALT  16   --   13   AP  36*   --   32*   TBILI  0.4   --   0.3   TP  7.0   --   5.2*   ALB  2.7*   --   2.2*   GLOB  4.3*   --   3.0   LPSE   --   97   --          There has been no growth for blood culture in the last 8 hours. Urine culture still pending. Chlamydia/gonorrhea also pending. Diagnostic studies  · CXR on 4/4/18: No acute process  · CT Abd/pelvis: \"Mild bilateral hydronephrosis right greater than left, though no definite  obstructing etiology is identified. Bladder is slightly distended. Normal appendix. \"     Procedures  · None    Disposition:  Discharge to home group    Discharge/Transfer Medications  Current Discharge Medication List      CONTINUE these medications which have NOT CHANGED    Details   !! risperiDONE (RISPERDAL) 3 mg tablet Take 3 mg by mouth nightly. docusate sodium (COLACE) 100 mg capsule Take 100 mg by mouth two (2) times daily as needed for Constipation. hydrOXYzine HCl (ATARAX) 25 mg tablet Take 25 mg by mouth two (2) times daily as needed for Anxiety (agitation). nitrofurantoin, macrocrystal-monohydrate, (MACROBID) 100 mg capsule Take 1 Cap by mouth two (2) times a day for 5 days. Qty: 10 Cap, Refills: 0    Associated Diagnoses: Urine WBC increased      divalproex ER (DEPAKOTE ER) 500 mg ER tablet Take 500 mg by mouth three (3) times daily. !! risperiDONE (RISPERDAL) 2 mg tablet Take 2 mg by mouth daily. topiramate (TOPAMAX) 200 mg tablet Take 200 mg by mouth nightly. fluticasone (FLONASE) 50 mcg/actuation nasal spray 2 Sprays by Both Nostrils route daily. cetirizine (ZYRTEC) 10 mg tablet Take 1 Tab by mouth daily. Qty: 30 Tab, Refills: 5      levothyroxine (SYNTHROID) 75 mcg tablet Take 1 po qam.  Qty: 30 Tab, Refills: 5      drospirenone-ethinyl estradiol (ANAYA) 3-0.02 mg tab Take 1 Tab by mouth daily. Qty: 1 Package, Refills: 11      zolpidem (AMBIEN) 5 mg tablet Take 5 mg by mouth nightly as needed for Sleep.      traZODone (DESYREL) 100 mg tablet Take 200 mg by mouth nightly. benztropine (COGENTIN) 1 mg tablet Take 1 Tab by mouth two (2) times a day. Qty: 60 Tab, Refills: 3       !! - Potential duplicate medications found. Please discuss with provider.           Recommended follow-up tests after discharge  · Repeat WBC to ensure resolution of leukocytosis  · Follow-up on urine culture, blood culture, chlamydia/gonorrhea test     Diet:  Regular diet    Activity:  As tolerated     Discharge instructions to patient/family  Please seek medical attention for any new or worsening symptoms particularly fever, chest pain, shortness of breath, abdominal pain, nausea, vomiting    Follow up plans/appointments  Follow-up Information     Follow up With Details Comments Contact Info    Shane Mclaughlin NP Schedule an appointment as soon as possible for a visit on 4/9/2018 at 1.30 pm, Follow up after hospital discharge N [address] [place] [address] [address], [address] [place] [address]  [phone]               Jenifer Wright MD  Family Medicine Resident    Cc: Shane Mclaughlin NP

## 2018-04-06 LAB
BACTERIA SPEC CULT: NORMAL
C TRACH DNA SPEC QL NAA+PROBE: NEGATIVE
CC UR VC: NORMAL
N GONORRHOEA DNA SPEC QL NAA+PROBE: NEGATIVE
SAMPLE TYPE: NORMAL
SERVICE CMNT-IMP: NORMAL
SERVICE CMNT-IMP: NORMAL
SPECIMEN SOURCE: NORMAL

## 2018-04-06 NOTE — PROGRESS NOTES
Spiritual Care Partner Volunteer visited patient on 4/5/18. Documented by:    LUDIVINA ChaconS.   Spiritual Care Department  If needs arise please call William-SAVANNAH ([phone])

## 2018-04-09 ENCOUNTER — OFFICE VISIT (OUTPATIENT)
Dept: FAMILY MEDICINE CLINIC | Age: 33
End: 2018-04-09

## 2018-04-09 VITALS
BODY MASS INDEX: 22.2 KG/M2 | RESPIRATION RATE: 16 BRPM | WEIGHT: 130 LBS | HEIGHT: 64 IN | DIASTOLIC BLOOD PRESSURE: 88 MMHG | SYSTOLIC BLOOD PRESSURE: 131 MMHG | HEART RATE: 114 BPM | TEMPERATURE: 97.9 F | OXYGEN SATURATION: 99 %

## 2018-04-09 DIAGNOSIS — D72.829 LEUKOCYTOSIS, UNSPECIFIED TYPE: ICD-10-CM

## 2018-04-09 DIAGNOSIS — F72 MR (MENTAL RETARDATION), SEVERE: ICD-10-CM

## 2018-04-09 DIAGNOSIS — E03.1 CONGENITAL HYPOTHYROIDISM WITHOUT GOITER: ICD-10-CM

## 2018-04-09 DIAGNOSIS — K59.00 CONSTIPATION, UNSPECIFIED CONSTIPATION TYPE: Primary | ICD-10-CM

## 2018-04-09 RX ORDER — LEVOTHYROXINE SODIUM 50 UG/1
50 TABLET ORAL
Qty: 30 TAB | Refills: 2 | Status: SHIPPED | OUTPATIENT
Start: 2018-04-09 | End: 2018-07-11 | Stop reason: SDUPTHER

## 2018-04-09 RX ORDER — POLYETHYLENE GLYCOL 3350 17 G/17G
17 POWDER, FOR SOLUTION ORAL DAILY
Qty: 30 PACKET | Refills: 5 | Status: SHIPPED | OUTPATIENT
Start: 2018-04-09 | End: 2018-05-09

## 2018-04-09 RX ORDER — DOCUSATE SODIUM 100 MG/1
100 CAPSULE, LIQUID FILLED ORAL 2 TIMES DAILY
Qty: 60 CAP | Refills: 5 | Status: SHIPPED | OUTPATIENT
Start: 2018-04-09 | End: 2018-07-08

## 2018-04-09 NOTE — PROGRESS NOTES
Chief Complaint   Patient presents with    Follow-up     ER Visit 4/4/18     she is a 29y.o. year old female who presents for evalution. Pt here for hospital F/U. Still having a lot of behavioral issues that had been worsening, was refusing to eat and gagging a lot, complaining of abd pain. In ER WBC were extremely elevated, pt admitted and given antibiotics - WBC decreased back to almost normal range. Caregiver and mother report pt still having behavior issues, not eating and seems very agitated. Mother would like pt to be started on something to help with constipation and would like Colace to be daily med instead of prn. Reviewed of hospital labs shows TSH still too low - will modify medication today. Reviewed PmHx, RxHx, FmHx, SocHx, AllgHx and updated and dated in the chart. Review of Systems - negative except as listed above in the HPI    Objective:     Vitals:    04/09/18 1341   BP: 131/88   Pulse: (!) 114   Resp: 16   Temp: 97.9 °F (36.6 °C)   TempSrc: Oral   SpO2: 99%   Weight: 130 lb (59 kg)   Height: 5' 4\" (1.626 m)     Physical Examination: General appearance - alert, well appearing, and in no distress  Ears - bilateral TM's and external ear canals normal  Nose - normal and patent, no erythema, discharge or polyps  Mouth - mucous membranes moist, pharynx normal without lesions  Neck - supple, no significant adenopathy  Chest - clear to auscultation, no wheezes, rales or rhonchi, symmetric air entry  Heart - normal rate, regular rhythm, normal S1, S2, no murmurs, rubs, clicks or gallops  Abdomen - soft, nontender, nondistended, no masses or organomegaly    Assessment/ Plan:   Diagnoses and all orders for this visit:    1. Constipation, unspecified constipation type  -     polyethylene glycol (MIRALAX) 17 gram packet; Take 1 Packet by mouth daily for 30 days. -     docusate sodium (COLACE) 100 mg capsule; Take 1 Cap by mouth two (2) times a day for 90 days. New rx. F/U prn    2. Leukocytosis, unspecified type  -     CBC WITH AUTOMATED DIFF  Recheck today to ensure have not spike again. 3. Congenital hypothyroidism without goiter  -     levothyroxine (SYNTHROID) 50 mcg tablet; Take 1 Tab by mouth Daily (before breakfast). Dosage change - recheck 6 wks. 4. MR (mental retardation), severe  F/U with psychiatrist for medication management and behavioral changes. Pt voiced understanding regarding plan of care. Follow-up Disposition:  Return in about 6 weeks (around 5/21/2018) for thyroid medication . I have discussed the diagnosis with the patient and the intended plan as seen in the above orders. The patient has received an after-visit summary and questions were answered concerning future plans.      Medication Side Effects and Warnings were discussed with patient    Josephine Billings NP

## 2018-04-09 NOTE — MR AVS SNAPSHOT
[address] [address] [phone] Patient: Dustin Cintron MRN: [id number] QZL:4[birth year]08/[birth year]7 Visit Information Date & Time Provider Department Dept. Phone Encounter #  
 4/9/2018  1:30 PM Josephine Billings NP [address] [facility] [phone] [id number] Follow-up Instructions Return in about 6 weeks (around 5/21/2018) for thyroid medication . Your Appointments 4/27/2018  3:00 PM  
ACUTE CARE with Josephine Billings NP [address] [facility] ([address]) Appt Note: repeat bloodwork  
 [address] [place] [address] [address] [address] [phone]  
  
   
 [address] [place] [address] Road [address] Upcoming Health Maintenance Date Due Pneumococcal 19-64 Highest Risk (1 of 3 - PCV13) 1/31/2005 PAP AKA CERVICAL CYTOLOGY 5/27/2018 DTaP/Tdap/Td series (2 - Td) 2/4/2025 Allergies as of 4/9/2018  Review Complete On: 4/9/2018 By: Malcom Robbins, LPNYLA Severity Noted Reaction Type Reactions Bactrim [Sulfamethoprim]  01/03/2018    Other (comments) Dehydration Phenobarbital  08/24/2011    Unknown (comments) Unable to obtain Current Immunizations  Reviewed on 2/9/2016 Name Date Influenza Vaccine (Quad) PF 4/5/2018  4:58 PM, 11/13/2014 TB Skin Test (PPD) Intradermal 2/9/2016, 2/4/2014, 2/5/2013 Tdap 2/4/2015 Not reviewed this visit You Were Diagnosed With   
  
 Codes Comments Constipation, unspecified constipation type    -  Primary ICD-10-CM: K59.00 ICD-9-CM: 564.00 Leukocytosis, unspecified type     ICD-10-CM: D72.829 ICD-9-CM: 288.60 Congenital hypothyroidism without goiter     ICD-10-CM: E03.1 ICD-9-CM: 969 MR (mental retardation), severe     ICD-10-CM: F72 
ICD-9-CM: 318.1 Vitals BP Pulse Temp Resp Height(growth percentile) Weight(growth percentile) 131/88 (!) 114 97.9 °F (36.6 °C) (Oral) 16 5' 4\" (1.626 m) 130 lb (59 kg) SpO2 BMI OB Status Smoking Status 99% 22.31 kg/m2 Medically Induced Never Smoker BMI and BSA Data Body Mass Index Body Surface Area  
 22.31 kg/m 2 1.63 m 2 Preferred Pharmacy Pharmacy Name [facility] [address] [phone] Your Updated Medication List  
  
   
This list is accurate as of 4/9/18  2:06 PM.  Always use your most recent med list.  
  
  
  
  
 benztropine 1 mg tablet Commonly known as:  COGENTIN Take 1 Tab by mouth two (2) times a day. cetirizine 10 mg tablet Commonly known as:  ZYRTEC Take 1 Tab by mouth daily. DEPAKOTE  mg ER tablet Generic drug:  divalproex ER Take 500 mg by mouth three (3) times daily. docusate sodium 100 mg capsule Commonly known as:  Grey Guppy Take 1 Cap by mouth two (2) times a day for 90 days. drospirenone-ethinyl estradiol 3-0.02 mg Tab Commonly known as:  ANAYA Take 1 Tab by mouth daily. FLONASE 50 mcg/actuation nasal spray Generic drug:  fluticasone 2 Sprays by Both Nostrils route daily. hydrOXYzine HCl 25 mg tablet Commonly known as:  ATARAX Take 25 mg by mouth two (2) times daily as needed for Anxiety (agitation). levothyroxine 50 mcg tablet Commonly known as:  SYNTHROID Take 1 Tab by mouth Daily (before breakfast). polyethylene glycol 17 gram packet Commonly known as:  Kirk Baumgarten Take 1 Packet by mouth daily for 30 days. * risperiDONE 2 mg tablet Commonly known as:  RisperDAL Take 2 mg by mouth daily. * risperiDONE 3 mg tablet Commonly known as:  RisperDAL Take 3 mg by mouth nightly. topiramate 200 mg tablet Commonly known as:  TOPAMAX Take 200 mg by mouth nightly. traZODone 100 mg tablet Commonly known as:  Margarita Sunni Take 200 mg by mouth nightly. zolpidem 5 mg tablet Commonly known as:  AMBIEN Take 5 mg by mouth nightly as needed for Sleep. * Notice: This list has 2 medication(s) that are the same as other medications prescribed for you. Read the directions carefully, and ask your doctor or other care provider to review them with you. Prescriptions Sent to Pharmacy Refills  
 polyethylene glycol (MIRALAX) 17 gram packet 5 Sig: Take 1 Packet by mouth daily for 30 days. Class: Normal  
 Pharmacy: [address] [place] [address] #: [phone] Route: Oral  
 docusate sodium (COLACE) 100 mg capsule 5 Sig: Take 1 Cap by mouth two (2) times a day for 90 days. Class: Normal  
 Pharmacy: [address] [place] [address] #: [phone] Route: Oral  
 levothyroxine (SYNTHROID) 50 mcg tablet 2 Sig: Take 1 Tab by mouth Daily (before breakfast). Class: Normal  
 Pharmacy: [address] [place] [address] Ph #: [phone] Route: Oral  
  
We Performed the Following CBC WITH AUTOMATED DIFF [06915 CPT(R)] Follow-up Instructions Return in about 6 weeks (around 5/21/2018) for thyroid medication . Introducing [facility]! [employer] introduces [employer] patient portal. Now you can access parts of your medical record, email your doctor's office, and request medication refills online. 1. In your internet browser, go to [email or web address] 2. Click on the First Time User? Click Here link in the Sign In box. You will see the New Member Sign Up page. 3. Enter your Essential [employer] Access Code exactly as it appears below. You will not need to use this code after youve completed the sign-up process. If you do not sign up before the expiration date, you must request a new code. · [employer] Access Code: [email or web address] Expires: 7/3/2018  5:02 PM 
 
4.  Enter the last four digits of your Social Security Number (xxxx) and Date of Birth (mm/dd/yyyy) as indicated and click Submit. You will be taken to the next sign-up page. 5. Create a ShareNotes.com ID. This will be your ShareNotes.com login ID and cannot be changed, so think of one that is secure and easy to remember. 6. Create a ShareNotes.com password. You can change your password at any time. 7. Enter your Password Reset Question and Answer. This can be used at a later time if you forget your password. 8. Enter your e-mail address. You will receive e-mail notification when new information is available in [address] 9. Click Sign Up. You can now view and download portions of your medical record. 10. Click the Download Summary menu link to download a portable copy of your medical information. If you have questions, please visit the Frequently Asked Questions section of the [email or web address] website. Remember, ShareNotes.com is NOT to be used for urgent needs. For medical emergencies, dial 911. Now available from your iPhone and Android! Please provide this summary of care documentation to your next provider. Your primary care clinician is listed as Radha Ugalde. If you have any questions after today's visit, please call [phone].

## 2018-04-09 NOTE — PROGRESS NOTES
1. Have you been to the ER, urgent care clinic since your last visit? Hospitalized since your last visit? Yes, [facility], 4/4/18  2. Have you seen or consulted any other health care providers outside of the [address] [place] [address] since your last visit? Include any pap smears or colon screening.  No     Chief Complaint   Patient presents with    Follow-up     ER Visit 4/4/18

## 2018-04-09 NOTE — LETTER
4/11/2018 10:28 AM 
 
Ms. Yani Montague [address] [place] [id number] Dear Yani Montague: Please find your most recent results below. Resulted Orders CBC WITH AUTOMATED DIFF Result Value Ref Range WBC 7.0 3.4 - 10.8 x10E3/uL  
 RBC 3.68 (L) 3.77 - 5.28 x10E6/uL HGB 10.3 (L) 11.1 - 15.9 g/dL HCT 31.7 (L) 34.0 - 46.6 % MCV 86 79 - 97 fL  
 MCH 28.0 26.6 - 33.0 pg  
 MCHC 32.5 31.5 - 35.7 g/dL  
 RDW 14.8 12.3 - 15.4 % PLATELET 164 282 - 745 x10E3/uL NEUTROPHILS 43 Not Estab. % Lymphocytes 47 Not Estab. % MONOCYTES 7 Not Estab. % EOSINOPHILS 2 Not Estab. % BASOPHILS 1 Not Estab. %  
 ABS. NEUTROPHILS 3.0 1.4 - 7.0 x10E3/uL Abs Lymphocytes 3.3 (H) 0.7 - 3.1 x10E3/uL  
 ABS. MONOCYTES 0.5 0.1 - 0.9 x10E3/uL  
 ABS. EOSINOPHILS 0.2 0.0 - 0.4 x10E3/uL  
 ABS. BASOPHILS 0.1 0.0 - 0.2 x10E3/uL IMMATURE GRANULOCYTES 0 Not Estab. %  
 ABS. IMM. GRANS. 0.0 0.0 - 0.1 x10E3/uL Narrative Performed at:  [address]  [id number] : Alma Rosa Mata MD, Phone:  [phone] RECOMMENDATIONS: 
Please inform caregivers that WBC are still within normal limits which is good news. Ashlee Jasbir still present but I still think that could be due to infection - recheck 6 wks with thyroid Please call me if you have any questions: [phone] Sincerely, Todd Davis NP

## 2018-04-10 LAB
BACTERIA SPEC CULT: NORMAL
BASOPHILS # BLD AUTO: 0.1 X10E3/UL (ref 0–0.2)
BASOPHILS NFR BLD AUTO: 1 %
EOSINOPHIL # BLD AUTO: 0.2 X10E3/UL (ref 0–0.4)
EOSINOPHIL NFR BLD AUTO: 2 %
ERYTHROCYTE [DISTWIDTH] IN BLOOD BY AUTOMATED COUNT: 14.8 % (ref 12.3–15.4)
HCT VFR BLD AUTO: 31.7 % (ref 34–46.6)
HGB BLD-MCNC: 10.3 G/DL (ref 11.1–15.9)
IMM GRANULOCYTES # BLD: 0 X10E3/UL (ref 0–0.1)
IMM GRANULOCYTES NFR BLD: 0 %
LYMPHOCYTES # BLD AUTO: 3.3 X10E3/UL (ref 0.7–3.1)
LYMPHOCYTES NFR BLD AUTO: 47 %
MCH RBC QN AUTO: 28 PG (ref 26.6–33)
MCHC RBC AUTO-ENTMCNC: 32.5 G/DL (ref 31.5–35.7)
MCV RBC AUTO: 86 FL (ref 79–97)
MONOCYTES # BLD AUTO: 0.5 X10E3/UL (ref 0.1–0.9)
MONOCYTES NFR BLD AUTO: 7 %
NEUTROPHILS # BLD AUTO: 3 X10E3/UL (ref 1.4–7)
NEUTROPHILS NFR BLD AUTO: 43 %
PLATELET # BLD AUTO: 181 X10E3/UL (ref 150–379)
RBC # BLD AUTO: 3.68 X10E6/UL (ref 3.77–5.28)
SERVICE CMNT-IMP: NORMAL
WBC # BLD AUTO: 7 X10E3/UL (ref 3.4–10.8)

## 2018-04-11 NOTE — PROGRESS NOTES
Please inform caregivers that WBC are still within normal limits which is good news. Anemia still present but I still think that could be due to infection - recheck 6 wks with thyroid.    Thanks,  N

## 2018-05-23 ENCOUNTER — OFFICE VISIT (OUTPATIENT)
Dept: FAMILY MEDICINE CLINIC | Age: 33
End: 2018-05-23

## 2018-05-23 VITALS
TEMPERATURE: 97.4 F | RESPIRATION RATE: 16 BRPM | SYSTOLIC BLOOD PRESSURE: 107 MMHG | HEART RATE: 96 BPM | DIASTOLIC BLOOD PRESSURE: 72 MMHG | OXYGEN SATURATION: 99 % | BODY MASS INDEX: 21.34 KG/M2 | WEIGHT: 125 LBS | HEIGHT: 64 IN

## 2018-05-23 DIAGNOSIS — D64.9 ANEMIA, UNSPECIFIED TYPE: ICD-10-CM

## 2018-05-23 DIAGNOSIS — E03.1 CONGENITAL HYPOTHYROIDISM WITHOUT GOITER: ICD-10-CM

## 2018-05-23 DIAGNOSIS — R30.0 DYSURIA: Primary | ICD-10-CM

## 2018-05-23 DIAGNOSIS — N39.0 RECURRENT UTI: ICD-10-CM

## 2018-05-23 LAB
BILIRUB UR QL STRIP: NEGATIVE
GLUCOSE UR-MCNC: NEGATIVE MG/DL
KETONES P FAST UR STRIP-MCNC: NEGATIVE MG/DL
PH UR STRIP: 6 [PH] (ref 4.6–8)
PROT UR QL STRIP: NEGATIVE
SP GR UR STRIP: 1.01 (ref 1–1.03)
UA UROBILINOGEN AMB POC: NORMAL (ref 0.2–1)
URINALYSIS CLARITY POC: CLEAR
URINALYSIS COLOR POC: YELLOW
URINE BLOOD POC: NORMAL
URINE LEUKOCYTES POC: NORMAL
URINE NITRITES POC: NEGATIVE

## 2018-05-23 RX ORDER — SALIVA STIMULANT COMB. NO.4
500 SPRAY, NON-AEROSOL (ML) MUCOUS MEMBRANE DAILY
Qty: 30 CAP | Refills: 5 | Status: ON HOLD | OUTPATIENT
Start: 2018-05-23 | End: 2022-09-28

## 2018-05-23 RX ORDER — NITROFURANTOIN 25; 75 MG/1; MG/1
100 CAPSULE ORAL 2 TIMES DAILY
Qty: 10 CAP | Refills: 0 | Status: SHIPPED | OUTPATIENT
Start: 2018-05-23 | End: 2018-05-28

## 2018-05-23 NOTE — PATIENT INSTRUCTIONS

## 2018-05-23 NOTE — PROGRESS NOTES
Chief Complaint   Patient presents with    Bladder Infection     she is a 29y.o. year old female who presents for evalution. Mom noticed Sunday pt was having urinary frequency and some incontinence. Pt has UTI history in past year. Caregivers state pt drinking enough water throughout daytime but does hold urine and not use bathroom frequently. No fever or chills. No low back pain or abdominal pain. Also needs recheck of TSH and recheck on anemia. Reviewed PmHx, RxHx, FmHx, SocHx, AllgHx and updated and dated in the chart. Review of Systems - negative except as listed above in the HPI    Objective:     Vitals:    05/23/18 1509   BP: 107/72   Pulse: 96   Resp: 16   Temp: 97.4 °F (36.3 °C)   TempSrc: Oral   SpO2: 99%   Weight: 125 lb (56.7 kg)   Height: 5' 4\" (1.626 m)     Physical Examination: General appearance - alert, well appearing, and in no distress  Chest - clear to auscultation, no wheezes, rales or rhonchi, symmetric air entry  Heart - normal rate, regular rhythm, normal S1, S2, no murmurs, rubs, clicks or gallops  Abdomen - soft, nontender, nondistended, no masses or organomegaly  No CVA tenderness     Assessment/ Plan:   Diagnoses and all orders for this visit:    1. Dysuria  -     AMB POC URINALYSIS DIP STICK AUTO W/O MICRO  -     CULTURE, URINE  -     nitrofurantoin, macrocrystal-monohydrate, (MACROBID) 100 mg capsule; Take 1 Cap by mouth two (2) times a day for 5 days. Pt instructed to take full course of antibiotics and increase water consumption. F/U if no improvement or develops fever/chills/nausea/vomiting. 2. Congenital hypothyroidism without goiter  -     TSH 3RD GENERATION  Will decide on F/U after reviewing labs. 3. Anemia, unspecified type  -     CBC WITH AUTOMATED DIFF  Will decide on F/U after reviewing labs. 4. Recurrent UTI  -     cranberry extract 500 mg cap capsule; Take 1 Cap by mouth daily. New rx for prevention.   Encouraged adequate water consumption. Pt voiced understanding regarding plan of care. Follow-up Disposition:  Return if symptoms worsen or fail to improve. I have discussed the diagnosis with the patient and the intended plan as seen in the above orders. The patient has received an after-visit summary and questions were answered concerning future plans.      Medication Side Effects and Warnings were discussed with patient    Shane Mclaughlin NP

## 2018-05-23 NOTE — PROGRESS NOTES
1. Have you been to the ER, urgent care clinic since your last visit? Hospitalized since your last visit? No    2. Have you seen or consulted any other health care providers outside of the [facility] since your last visit? Include any pap smears or colon screening.  No     Chief Complaint   Patient presents with    Bladder Infection

## 2018-05-23 NOTE — MR AVS SNAPSHOT
[address] [place] [address] 
[phone] Patient: Tasia Henderson MRN: [id number] HP Visit Information Date & Time Provider Department Dept. Phone Encounter #  
 2018  2:30 PM Roman Salcedo NP [address] [facility] [phone] [id number] Follow-up Instructions Return if symptoms worsen or fail to improve. Upcoming Health Maintenance Date Due Pneumococcal 19-64 Highest Risk (1 of 3 - PCV13) 2005 PAP AKA CERVICAL CYTOLOGY 2018 Influenza Age 5 to Adult 2018 DTaP/Tdap/Td series (2 - Td) 2025 Allergies as of 2018  Review Complete On: 2018 By: Roman Salcedo NP Severity Noted Reaction Type Reactions Bactrim [Sulfamethoprim]  2018    Other (comments) Dehydration Phenobarbital  2011    Unknown (comments) Unable to obtain Current Immunizations  Reviewed on 2016 Name Date Influenza Vaccine (Quad) PF 2018  4:58 PM, 2014 TB Skin Test (PPD) Intradermal 2016, 2014, 2013 Tdap 2015 Not reviewed this visit You Were Diagnosed With   
  
 Codes Comments Dysuria    -  Primary ICD-10-CM: R30.0 ICD-9-CM: 229. 1 Congenital hypothyroidism without goiter     ICD-10-CM: E03.1 ICD-9-CM: 077 Anemia, unspecified type     ICD-10-CM: D64.9 ICD-9-CM: 285.9 Recurrent UTI     ICD-10-CM: N39.0 ICD-9-CM: 599.0 Vitals BP Pulse Temp Resp Height(growth percentile) Weight(growth percentile) 107/72 96 97.4 °F (36.3 °C) (Oral) 16 5' 4\" (1.626 m) 125 lb (56.7 kg) SpO2 BMI OB Status Smoking Status 99% 21.46 kg/m2 Medically Induced Never Smoker BMI and BSA Data Body Mass Index Body Surface Area  
 21.46 kg/m 2 1.6 m 2 Preferred Pharmacy Pharmacy Name Phone Tresa Treemily Smith , [facility] 161 [phone] Your Updated Medication List  
  
   
This list is accurate as of 5/23/18  3:42 PM.  Always use your most recent med list.  
  
  
  
  
 benztropine 1 mg tablet Commonly known as:  COGENTIN Take 1 Tab by mouth two (2) times a day. cetirizine 10 mg tablet Commonly known as:  ZYRTEC  
TAKE 1 TABLET BY MOUTH DAILY  
  
 cranberry extract 500 mg Cap capsule Take 1 Cap by mouth daily. DEPAKOTE  mg ER tablet Generic drug:  divalproex ER Take 500 mg by mouth three (3) times daily. docusate sodium 100 mg capsule Commonly known as:  Hermina  Take 1 Cap by mouth two (2) times a day for 90 days. drospirenone-ethinyl estradiol 3-0.02 mg Tab Commonly known as:  ANAYA Take 1 Tab by mouth daily. FLONASE 50 mcg/actuation nasal spray Generic drug:  fluticasone 2 Sprays by Both Nostrils route daily. hydrOXYzine HCl 25 mg tablet Commonly known as:  ATARAX Take 25 mg by mouth two (2) times daily as needed for Anxiety (agitation). levothyroxine 50 mcg tablet Commonly known as:  SYNTHROID Take 1 Tab by mouth Daily (before breakfast). nitrofurantoin (macrocrystal-monohydrate) 100 mg capsule Commonly known as:  MACROBID Take 1 Cap by mouth two (2) times a day for 5 days. * risperiDONE 2 mg tablet Commonly known as:  RisperDAL Take 2 mg by mouth daily. * risperiDONE 3 mg tablet Commonly known as:  RisperDAL Take 3 mg by mouth nightly. topiramate 200 mg tablet Commonly known as:  TOPAMAX Take 200 mg by mouth nightly. traZODone 100 mg tablet Commonly known as:  Pawlet Ort Take 200 mg by mouth nightly. zolpidem 5 mg tablet Commonly known as:  AMBIEN Take 5 mg by mouth nightly as needed for Sleep. * Notice: This list has 2 medication(s) that are the same as other medications prescribed for you. Read the directions carefully, and ask your doctor or other care provider to review them with you. Prescriptions Sent to Pharmacy Refills  
 nitrofurantoin, macrocrystal-monohydrate, (MACROBID) 100 mg capsule 0 Sig: Take 1 Cap by mouth two (2) times a day for 5 days. Class: Normal  
 Pharmacy: [address] [place] [address] #: [phone] Route: Oral  
 cranberry extract 500 mg cap capsule 5 Sig: Take 1 Cap by mouth daily. Class: Normal  
 Pharmacy: [address] [place] [address] Ph #: [phone] Route: Oral  
  
We Performed the Following AMB POC URINALYSIS DIP STICK AUTO W/O MICRO [02268 CPT(R)] CULTURE, URINE [id number] CPT(R)] Follow-up Instructions Return if symptoms worsen or fail to improve. Patient Instructions Urinary Tract Infection in Women: Care Instructions Your Care Instructions A urinary tract infection, or UTI, is a general term for an infection anywhere between the kidneys and the urethra (where urine comes out). Most UTIs are bladder infections. They often cause pain or burning when you urinate. UTIs are caused by bacteria and can be cured with antibiotics. Be sure to complete your treatment so that the infection goes away. Follow-up care is a key part of your treatment and safety. Be sure to make and go to all appointments, and call your doctor if you are having problems. It's also a good idea to know your test results and keep a list of the medicines you take. How can you care for yourself at home? · Take your antibiotics as directed. Do not stop taking them just because you feel better. You need to take the full course of antibiotics. · Drink extra water and other fluids for the next day or two. This may help wash out the bacteria that are causing the infection. (If you have kidney, heart, or liver disease and have to limit fluids, talk with your doctor before you increase your fluid intake.) · Avoid drinks that are carbonated or have caffeine. They can irritate the bladder. · Urinate often. Try to empty your bladder each time. · To relieve pain, take a hot bath or lay a heating pad set on low over your lower belly or genital area. Never go to sleep with a heating pad in place. To prevent UTIs · Drink plenty of water each day. This helps you urinate often, which clears bacteria from your system. (If you have kidney, heart, or liver disease and have to limit fluids, talk with your doctor before you increase your fluid intake.) · Urinate when you need to. · Urinate right after you have sex. · Change sanitary pads often. · Avoid douches, bubble baths, feminine hygiene sprays, and other feminine hygiene products that have deodorants. · After going to the bathroom, wipe from front to back. When should you call for help? Call your doctor now or seek immediate medical care if: 
? · Symptoms such as fever, chills, nausea, or vomiting get worse or appear for the first time. ? · You have new pain in your back just below your rib cage. This is called flank pain. ? · There is new blood or pus in your urine. ? · You have any problems with your antibiotic medicine. ? Watch closely for changes in your health, and be sure to contact your doctor if: 
? · You are not getting better after taking an antibiotic for 2 days. ? · Your symptoms go away but then come back. Where can you learn more? Go to http://sherrell-savage.info/. Enter D268 in the search box to learn more about \"Urinary Tract Infection in Women: Care Instructions. \" Current as of: May 12, 2017 Content Version: 11.4 © 5749-9210 Roomlr. Care instructions adapted under license by [employer] (which disclaims liability or warranty for this information).  If you have questions about a medical condition or this instruction, always ask your healthcare professional. [employer] disclaims any warranty or liability for your use of this information. Introducing [facility] & HEALTH SERVICES! [employer] introduces Quill Content patient portal. Now you can access parts of your medical record, email your doctor's office, and request medication refills online. 1. In your internet browser, go to [email or web address] 2. Click on the First Time User? Click Here link in the Sign In box. You will see the New Member Sign Up page. 3. Enter your Quill Content Access Code exactly as it appears below. You will not need to use this code after youve completed the sign-up process. If you do not sign up before the expiration date, you must request a new code. · Quill Content Access Code: [email or web address] Expires: 7/3/2018  5:02 PM 
 
4. Enter the last four digits of your Social Security Number (xxxx) and Date of Birth (mm/dd/yyyy) as indicated and click Submit. You will be taken to the next sign-up page. 5. Create a Quill Content ID. This will be your Quill Content login ID and cannot be changed, so think of one that is secure and easy to remember. 6. Create a Quill Content password. You can change your password at any time. 7. Enter your Password Reset Question and Answer. This can be used at a later time if you forget your password. 8. Enter your e-mail address. You will receive e-mail notification when new information is available in [address] 9. Click Sign Up. You can now view and download portions of your medical record. 10. Click the Download Summary menu link to download a portable copy of your medical information. If you have questions, please visit the Frequently Asked Questions section of the Quill Content website. Remember, Quill Content is NOT to be used for urgent needs. For medical emergencies, dial 911. Now available from your iPhone and Android! Please provide this summary of care documentation to your next provider. Your primary care clinician is listed as Rosa M Chao. If you have any questions after today's visit, please call [phone].

## 2018-05-24 LAB
BASOPHILS # BLD AUTO: 0 X10E3/UL (ref 0–0.2)
BASOPHILS NFR BLD AUTO: 0 %
EOSINOPHIL # BLD AUTO: 0.1 X10E3/UL (ref 0–0.4)
EOSINOPHIL NFR BLD AUTO: 1 %
ERYTHROCYTE [DISTWIDTH] IN BLOOD BY AUTOMATED COUNT: 14.8 % (ref 12.3–15.4)
HCT VFR BLD AUTO: 33.8 % (ref 34–46.6)
HGB BLD-MCNC: 10.7 G/DL (ref 11.1–15.9)
IMM GRANULOCYTES # BLD: 0 X10E3/UL (ref 0–0.1)
IMM GRANULOCYTES NFR BLD: 0 %
LYMPHOCYTES # BLD AUTO: 3.2 X10E3/UL (ref 0.7–3.1)
LYMPHOCYTES NFR BLD AUTO: 48 %
MCH RBC QN AUTO: 28.2 PG (ref 26.6–33)
MCHC RBC AUTO-ENTMCNC: 31.7 G/DL (ref 31.5–35.7)
MCV RBC AUTO: 89 FL (ref 79–97)
MONOCYTES # BLD AUTO: 0.4 X10E3/UL (ref 0.1–0.9)
MONOCYTES NFR BLD AUTO: 6 %
NEUTROPHILS # BLD AUTO: 3.1 X10E3/UL (ref 1.4–7)
NEUTROPHILS NFR BLD AUTO: 45 %
PLATELET # BLD AUTO: 153 X10E3/UL (ref 150–379)
RBC # BLD AUTO: 3.8 X10E6/UL (ref 3.77–5.28)
TSH SERPL DL<=0.005 MIU/L-ACNC: 1.08 UIU/ML (ref 0.45–4.5)
WBC # BLD AUTO: 6.8 X10E3/UL (ref 3.4–10.8)

## 2018-05-24 NOTE — PROGRESS NOTES
Please inform caregivers anemia still present but improving - stable since had work-up done in hospital.  Recheck 3 mo. Thyroid level back to normal, cont current dosage.    Thanks,  N

## 2018-05-25 LAB — BACTERIA UR CULT: ABNORMAL

## 2018-05-25 NOTE — PROGRESS NOTES
Please inform caregiver that pt's urine culture came back positive for infection and she was treated correctly in office. Since this has been recurrent problem for patient recently I would like them to F/U in 2 weeks so we can recheck urine to ensure infection has cleared completely.    Thanks,  N

## 2018-06-11 ENCOUNTER — OFFICE VISIT (OUTPATIENT)
Dept: FAMILY MEDICINE CLINIC | Age: 33
End: 2018-06-11

## 2018-06-11 VITALS
HEART RATE: 62 BPM | DIASTOLIC BLOOD PRESSURE: 72 MMHG | WEIGHT: 125 LBS | BODY MASS INDEX: 21.34 KG/M2 | SYSTOLIC BLOOD PRESSURE: 110 MMHG | OXYGEN SATURATION: 97 % | TEMPERATURE: 97.4 F | RESPIRATION RATE: 16 BRPM | HEIGHT: 64 IN

## 2018-06-11 DIAGNOSIS — N39.0 URINARY TRACT INFECTION WITHOUT HEMATURIA, SITE UNSPECIFIED: Primary | ICD-10-CM

## 2018-06-11 LAB
BILIRUB UR QL STRIP: NEGATIVE
GLUCOSE UR-MCNC: NEGATIVE MG/DL
KETONES P FAST UR STRIP-MCNC: NEGATIVE MG/DL
PH UR STRIP: 7 [PH] (ref 4.6–8)
PROT UR QL STRIP: NEGATIVE
SP GR UR STRIP: 1.01 (ref 1–1.03)
UA UROBILINOGEN AMB POC: NORMAL (ref 0.2–1)
URINALYSIS CLARITY POC: CLEAR
URINALYSIS COLOR POC: YELLOW
URINE BLOOD POC: NORMAL
URINE LEUKOCYTES POC: NORMAL
URINE NITRITES POC: NEGATIVE

## 2018-06-11 RX ORDER — FLUCONAZOLE 150 MG/1
150 TABLET ORAL DAILY
Qty: 1 TAB | Refills: 0 | Status: SHIPPED | OUTPATIENT
Start: 2018-06-11 | End: 2018-06-12

## 2018-06-11 RX ORDER — CIPROFLOXACIN 500 MG/1
500 TABLET ORAL 2 TIMES DAILY
Qty: 14 TAB | Refills: 0 | Status: SHIPPED | OUTPATIENT
Start: 2018-06-11 | End: 2018-06-18

## 2018-06-11 NOTE — PROGRESS NOTES
1. Have you been to the ER, urgent care clinic since your last visit? Hospitalized since your last visit? No    2. Have you seen or consulted any other health care providers outside of the [address] [place] [address] since your last visit? Include any pap smears or colon screening.  No     Chief Complaint   Patient presents with    Follow-up     Last OV

## 2018-06-11 NOTE — MR AVS SNAPSHOT
[address] [address] 
[phone] Patient: Lenore Elaine MRN: [id number] OXO:[id number] Visit Information Date & Time Provider Department Dept. Phone Encounter #  
 6/11/2018 11:15 AM Arminda Bender NP [address] [facility] [phone] [id number] Follow-up Instructions Return if symptoms worsen or fail to improve. Upcoming Health Maintenance Date Due Pneumococcal 19-64 Highest Risk (1 of 3 - PCV13) 1/31/2005 PAP AKA CERVICAL CYTOLOGY 5/27/2018 Influenza Age 5 to Adult 8/1/2018 DTaP/Tdap/Td series (2 - Td) 2/4/2025 Allergies as of 6/11/2018  Review Complete On: 6/11/2018 By: Chapito Haq LPN Severity Noted Reaction Type Reactions Bactrim [Sulfamethoprim]  01/03/2018    Other (comments) Dehydration Phenobarbital  08/24/2011    Unknown (comments) Unable to obtain Current Immunizations  Reviewed on 2/9/2016 Name Date Influenza Vaccine (Quad) PF 4/5/2018  4:58 PM, 11/13/2014 TB Skin Test (PPD) Intradermal 2/9/2016, 2/4/2014, 2/5/2013 Tdap 2/4/2015 Not reviewed this visit You Were Diagnosed With   
  
 Codes Comments Urinary tract infection without hematuria, site unspecified    -  Primary ICD-10-CM: N39.0 ICD-9-CM: 599.0 Vitals BP Pulse Temp Resp Height(growth percentile) Weight(growth percentile) 110/72 62 97.4 °F (36.3 °C) (Oral) 16 5' 4\" (1.626 m) 125 lb (56.7 kg) SpO2 BMI OB Status Smoking Status 97% 21.46 kg/m2 Medically Induced Never Smoker Vitals History BMI and BSA Data Body Mass Index Body Surface Area  
 21.46 kg/m 2 1.6 m 2 Preferred Pharmacy Pharmacy Name Phone Tresa Eldridge, [facility] [phone] Your Updated Medication List  
  
   
This list is accurate as of 6/11/18 11:29 AM.  Always use your most recent med list.  
  
  
  
  
 benztropine 1 mg tablet Commonly known as:  COGENTIN Take 1 Tab by mouth two (2) times a day. cetirizine 10 mg tablet Commonly known as:  ZYRTEC  
TAKE 1 TABLET BY MOUTH DAILY  
  
 ciprofloxacin HCl 500 mg tablet Commonly known as:  CIPRO Take 1 Tab by mouth two (2) times a day for 7 days. cranberry extract 500 mg Cap capsule Take 1 Cap by mouth daily. DEPAKOTE  mg ER tablet Generic drug:  divalproex ER Take 500 mg by mouth three (3) times daily. docusate sodium 100 mg capsule Commonly known as:  Neena Booze Take 1 Cap by mouth two (2) times a day for 90 days. drospirenone-ethinyl estradiol 3-0.02 mg Tab Commonly known as:  ANAYA Take 1 Tab by mouth daily. FLONASE 50 mcg/actuation nasal spray Generic drug:  fluticasone 2 Sprays by Both Nostrils route daily. fluconazole 150 mg tablet Commonly known as:  DIFLUCAN Take 1 Tab by mouth daily for 1 day. After finish antibiotic course  
  
 hydrOXYzine HCl 25 mg tablet Commonly known as:  ATARAX Take 25 mg by mouth two (2) times daily as needed for Anxiety (agitation). levothyroxine 50 mcg tablet Commonly known as:  SYNTHROID Take 1 Tab by mouth Daily (before breakfast). * risperiDONE 2 mg tablet Commonly known as:  RisperDAL Take 2 mg by mouth daily. * risperiDONE 3 mg tablet Commonly known as:  RisperDAL Take 3 mg by mouth nightly. topiramate 200 mg tablet Commonly known as:  TOPAMAX Take 200 mg by mouth nightly. traZODone 100 mg tablet Commonly known as:  Tigre Brooker Take 200 mg by mouth nightly. zolpidem 5 mg tablet Commonly known as:  AMBIEN Take 5 mg by mouth nightly as needed for Sleep. * Notice: This list has 2 medication(s) that are the same as other medications prescribed for you. Read the directions carefully, and ask your doctor or other care provider to review them with you. Prescriptions Sent to Pharmacy Refills  
 ciprofloxacin HCl (CIPRO) 500 mg tablet 0 Sig: Take 1 Tab by mouth two (2) times a day for 7 days. Class: Normal  
 Pharmacy: [address] [place] [address] [address] Ph #: [phone] Route: Oral  
 fluconazole (DIFLUCAN) 150 mg tablet 0 Sig: Take 1 Tab by mouth daily for 1 day. After finish antibiotic course Class: Normal  
 Pharmacy: [address] [place] [address] Ph #: [phone] Route: Oral  
  
We Performed the Following AMB POC URINALYSIS DIP STICK AUTO W/O MICRO [98387 CPT(R)] CULTURE, URINE [id number] CPT(R)] Follow-up Instructions Return if symptoms worsen or fail to improve. Patient Instructions Try not to use Hydroxyzine (prn for agitation) while on antibiotics Give Diflucan next day after stopping Cipro Don't given Cipro and Trazodone at same time if possible Hold cranberry pills while on antibiotic Urinary Tract Infection in Women: Care Instructions Your Care Instructions A urinary tract infection, or UTI, is a general term for an infection anywhere between the kidneys and the urethra (where urine comes out). Most UTIs are bladder infections. They often cause pain or burning when you urinate. UTIs are caused by bacteria and can be cured with antibiotics. Be sure to complete your treatment so that the infection goes away. Follow-up care is a key part of your treatment and safety. Be sure to make and go to all appointments, and call your doctor if you are having problems. It's also a good idea to know your test results and keep a list of the medicines you take. How can you care for yourself at home? · Take your antibiotics as directed. Do not stop taking them just because you feel better. You need to take the full course of antibiotics. · Drink extra water and other fluids for the next day or two.  This may help wash out the bacteria that are causing the infection. (If you have kidney, heart, or liver disease and have to limit fluids, talk with your doctor before you increase your fluid intake.) · Avoid drinks that are carbonated or have caffeine. They can irritate the bladder. · Urinate often. Try to empty your bladder each time. · To relieve pain, take a hot bath or lay a heating pad set on low over your lower belly or genital area. Never go to sleep with a heating pad in place. To prevent UTIs · Drink plenty of water each day. This helps you urinate often, which clears bacteria from your system. (If you have kidney, heart, or liver disease and have to limit fluids, talk with your doctor before you increase your fluid intake.) · Urinate when you need to. · Urinate right after you have sex. · Change sanitary pads often. · Avoid douches, bubble baths, feminine hygiene sprays, and other feminine hygiene products that have deodorants. · After going to the bathroom, wipe from front to back. When should you call for help? Call your doctor now or seek immediate medical care if: 
? · Symptoms such as fever, chills, nausea, or vomiting get worse or appear for the first time. ? · You have new pain in your back just below your rib cage. This is called flank pain. ? · There is new blood or pus in your urine. ? · You have any problems with your antibiotic medicine. ? Watch closely for changes in your health, and be sure to contact your doctor if: 
? · You are not getting better after taking an antibiotic for 2 days. ? · Your symptoms go away but then come back. Where can you learn more? Go to http://sherrell-savage.info/. Enter I511 in the search box to learn more about \"Urinary Tract Infection in Women: Care Instructions. \" Current as of: May 12, 2017 Content Version: 11.4 © 0759-8345 Healthwise, [employer].  Care instructions adapted under license by [address] (which disclaims liability or warranty for this information). If you have questions about a medical condition or this instruction, always ask your healthcare professional. [address] any warranty or liability for your use of this information. Introducing [facility] & HEALTH SERVICES! [employer] introduces [employer] patient portal. Now you can access parts of your medical record, email your doctor's office, and request medication refills online. 1. In your internet browser, go to [email or web address] 2. Click on the First Time User? Click Here link in the Sign In box. You will see the New Member Sign Up page. 3. Enter your [employer] Access Code exactly as it appears below. You will not need to use this code after youve completed the sign-up process. If you do not sign up before the expiration date, you must request a new code. · [employer] Access Code: [email or web address] Expires: 7/3/2018  5:02 PM 
 
4. Enter the last four digits of your Social Security Number (xxxx) and Date of Birth (mm/dd/yyyy) as indicated and click Submit. You will be taken to the next sign-up page. 5. Create a [employer] ID. This will be your [employer] login ID and cannot be changed, so think of one that is secure and easy to remember. 6. Create a [employer] password. You can change your password at any time. 7. Enter your Password Reset Question and Answer. This can be used at a later time if you forget your password. 8. Enter your e-mail address. You will receive e-mail notification when new information is available in [address] 9. Click Sign Up. You can now view and download portions of your medical record. 10. Click the Download Summary menu link to download a portable copy of your medical information. If you have questions, please visit the Frequently Asked Questions section of the [employer] website.  Remember, [employer] is NOT to be used for urgent needs. For medical emergencies, dial 911. Now available from your iPhone and Android! Please provide this summary of care documentation to your next provider. Your primary care clinician is listed as Daiana Castillo. If you have any questions after today's visit, please call [phone].

## 2018-06-11 NOTE — PROGRESS NOTES
Chief Complaint   Patient presents with   [address]     she is a 29y.o. year old female who presents for evalution. Pt here for UTI recheck. Took full course of antibiotics. Pt has had recurrent UTIs in past few months so wanted to F/U today to ensure infection has fully cleared. Reviewed PmHx, RxHx, FmHx, SocHx, AllgHx and updated and dated in the chart. Review of Systems - negative except as listed above in the HPI    Objective:     Vitals:    06/11/18 1120   BP: 110/72   Pulse: 62   Resp: 16   Temp: 97.4 °F (36.3 °C)   TempSrc: Oral   SpO2: 97%   Weight: 125 lb (56.7 kg)   Height: 5' 4\" (1.626 m)     Physical Examination: General appearance - alert, well appearing, and in no distress  Chest - clear to auscultation, no wheezes, rales or rhonchi, symmetric air entry  Heart - normal rate, regular rhythm, normal S1, S2, no murmurs, rubs, clicks or gallops  Abdomen - soft, nontender, nondistended, no masses or organomegaly  no CVA tenderness    Assessment/ Plan:   Diagnoses and all orders for this visit:    1. Urinary tract infection without hematuria, site unspecified  -     AMB POC URINALYSIS DIP STICK AUTO W/O MICRO  -     CULTURE, URINE  -     ciprofloxacin HCl (CIPRO) 500 mg tablet; Take 1 Tab by mouth two (2) times a day for 7 days. -     fluconazole (DIFLUCAN) 150 mg tablet; Take 1 Tab by mouth daily for 1 day. After finish antibiotic course  Dipstick still showing signs of infection. New rx today. Will decide on F/U after reviewing labs. Pt voiced understanding regarding plan of care. Follow-up Disposition:  Return if symptoms worsen or fail to improve. I have discussed the diagnosis with the patient and the intended plan as seen in the above orders. The patient has received an after-visit summary and questions were answered concerning future plans.      Medication Side Effects and Warnings were discussed with patient    Mallorie Veliz NP

## 2018-06-11 NOTE — PATIENT INSTRUCTIONS
Try not to use Hydroxyzine (prn for agitation) while on antibiotics    Give Diflucan next day after stopping Cipro    Don't given Cipro and Trazodone at same time if possible     Hold cranberry pills while on antibiotic     Urinary Tract Infection in Women: Care Instructions  Your Care Instructions    A urinary tract infection, or UTI, is a general term for an infection anywhere between the kidneys and the urethra (where urine comes out). Most UTIs are bladder infections. They often cause pain or burning when you urinate. UTIs are caused by bacteria and can be cured with antibiotics. Be sure to complete your treatment so that the infection goes away. Follow-up care is a key part of your treatment and safety. Be sure to make and go to all appointments, and call your doctor if you are having problems. It's also a good idea to know your test results and keep a list of the medicines you take. How can you care for yourself at home? · Take your antibiotics as directed. Do not stop taking them just because you feel better. You need to take the full course of antibiotics. · Drink extra water and other fluids for the next day or two. This may help wash out the bacteria that are causing the infection. (If you have kidney, heart, or liver disease and have to limit fluids, talk with your doctor before you increase your fluid intake.)  · Avoid drinks that are carbonated or have caffeine. They can irritate the bladder. · Urinate often. Try to empty your bladder each time. · To relieve pain, take a hot bath or lay a heating pad set on low over your lower belly or genital area. Never go to sleep with a heating pad in place. To prevent UTIs  · Drink plenty of water each day. This helps you urinate often, which clears bacteria from your system. (If you have kidney, heart, or liver disease and have to limit fluids, talk with your doctor before you increase your fluid intake.)  · Urinate when you need to.   · Urinate right after you have sex. · Change sanitary pads often. · Avoid douches, bubble baths, feminine hygiene sprays, and other feminine hygiene products that have deodorants. · After going to the bathroom, wipe from front to back. When should you call for help? Call your doctor now or seek immediate medical care if:  ? · Symptoms such as fever, chills, nausea, or vomiting get worse or appear for the first time. ? · You have new pain in your back just below your rib cage. This is called flank pain. ? · There is new blood or pus in your urine. ? · You have any problems with your antibiotic medicine. ? Watch closely for changes in your health, and be sure to contact your doctor if:  ? · You are not getting better after taking an antibiotic for 2 days. ? · Your symptoms go away but then come back. Where can you learn more? Go to http://sherrell-savage.info/. Enter W622 in the search box to learn more about \"Urinary Tract Infection in Women: Care Instructions. \"  Current as of: May 12, 2017  Content Version: 11.4  © 9993-8228 [employer]. Care instructions adapted under license by [employer] (which disclaims liability or warranty for this information). If you have questions about a medical condition or this instruction, always ask your healthcare professional. [address] any warranty or liability for your use of this information.

## 2018-07-11 DIAGNOSIS — E03.1 CONGENITAL HYPOTHYROIDISM WITHOUT GOITER: ICD-10-CM

## 2018-07-11 RX ORDER — LEVOTHYROXINE SODIUM 50 UG/1
TABLET ORAL
Qty: 31 TAB | Refills: 10 | Status: SHIPPED | OUTPATIENT
Start: 2018-07-11 | End: 2019-06-26 | Stop reason: SDUPTHER

## 2018-07-18 ENCOUNTER — OFFICE VISIT (OUTPATIENT)
Dept: FAMILY MEDICINE CLINIC | Age: 33
End: 2018-07-18

## 2018-07-18 VITALS
SYSTOLIC BLOOD PRESSURE: 118 MMHG | WEIGHT: 124 LBS | RESPIRATION RATE: 16 BRPM | HEIGHT: 64 IN | BODY MASS INDEX: 21.17 KG/M2 | HEART RATE: 97 BPM | DIASTOLIC BLOOD PRESSURE: 83 MMHG | TEMPERATURE: 98.6 F | OXYGEN SATURATION: 99 %

## 2018-07-18 DIAGNOSIS — R10.84 GENERALIZED ABDOMINAL PAIN: Primary | ICD-10-CM

## 2018-07-18 DIAGNOSIS — N39.0 RECURRENT UTI: ICD-10-CM

## 2018-07-18 NOTE — PROGRESS NOTES
Chief Complaint   Patient presents with    Referral Follow Up     Urology     she is a 29y.o. year old female who presents for evalution. Pt started complaining of abdominal pain today, has been slightly constipated. Has Miralax prn but has not yet used today. Pt has had recurrent UTIs. Behavioral  has asked her to see Urology for this since she typically has problems with behavior with UTI. Discussed with caregiver that pt's urine cultures are frequently negative but we typically treat first based on her behavioral symptoms. Caregiver is aware of this but still wants to see Urology. No behavioral issues or urinary issues today. Reviewed PmHx, RxHx, FmHx, SocHx, AllgHx and updated and dated in the chart. Review of Systems - negative except as listed above in the HPI    Objective:     Vitals:    07/18/18 1438   BP: 118/83   Pulse: 97   Resp: 16   Temp: 98.6 °F (37 °C)   TempSrc: Oral   SpO2: 99%   Weight: 124 lb (56.2 kg)   Height: 5' 4\" (1.626 m)     Physical Examination: General appearance - alert, well appearing, and in no distress  Chest - clear to auscultation, no wheezes, rales or rhonchi, symmetric air entry  Heart - normal rate, regular rhythm, normal S1, S2, no murmurs, rubs, clicks or gallops  Abdomen - soft, nontender, nondistended, no masses or organomegaly    Assessment/ Plan:   Diagnoses and all orders for this visit:    1. Generalized abdominal pain  Secondary to constipation, has Miralax to use prn.     2. Recurrent UTI  -     REFERRAL TO UROLOGY    Pt voiced understanding regarding plan of care. Follow-up Disposition:  Return if symptoms worsen or fail to improve. I have discussed the diagnosis with the patient and the intended plan as seen in the above orders. The patient has received an after-visit summary and questions were answered concerning future plans.      Medication Side Effects and Warnings were discussed with patient    Ami Sams NP

## 2018-07-18 NOTE — MR AVS SNAPSHOT
[address] [address] 
[phone] Patient: Lyssa Moseley MRN: [id number] Visit Information Date & Time Provider Department Dept. Phone Encounter #  
 7/18/2018  2:15 PM Suzette Velasco NP [address] [facility] [phone] [id number] Follow-up Instructions Return if symptoms worsen or fail to improve. Upcoming Health Maintenance Date Due Pneumococcal 19-64 Highest Risk (1 of 3 - PCV13) 1/31/2005 PAP AKA CERVICAL CYTOLOGY 5/27/2018 Influenza Age 5 to Adult 8/1/2018 DTaP/Tdap/Td series (2 - Td) 2/4/2025 Allergies as of 7/18/2018  Review Complete On: 7/18/2018 By: Kathryn Kathleen LPN Severity Noted Reaction Type Reactions Bactrim [Sulfamethoprim]  01/03/2018    Other (comments) Dehydration Phenobarbital  08/24/2011    Unknown (comments) Unable to obtain Current Immunizations  Reviewed on 2/9/2016 Name Date Influenza Vaccine (Quad) PF 4/5/2018  4:58 PM, 11/13/2014 TB Skin Test (PPD) Intradermal 2/9/2016, 2/4/2014, 2/5/2013 Tdap 2/4/2015 Not reviewed this visit You Were Diagnosed With   
  
 Codes Comments Generalized abdominal pain    -  Primary ICD-10-CM: R10.84 ICD-9-CM: 789.07 Recurrent UTI     ICD-10-CM: N39.0 ICD-9-CM: 599.0 Vitals BP Pulse Temp Resp Height(growth percentile) Weight(growth percentile) 118/83 97 98.6 °F (37 °C) (Oral) 16 5' 4\" (1.626 m) 124 lb (56.2 kg) SpO2 BMI OB Status Smoking Status 99% 21.28 kg/m2 Medically Induced Never Smoker Vitals History BMI and BSA Data Body Mass Index Body Surface Area  
 21.28 kg/m 2 1.59 m 2 Preferred Pharmacy Pharmacy Name Phone [facility], [address] [phone] Your Updated Medication List  
  
   
This list is accurate as of 7/18/18  2:46 PM.  Always use your most recent med list.  
  
  
  
  
 benztropine 1 mg tablet Commonly known as:  COGENTIN Take 1 Tab by mouth two (2) times a day. cetirizine 10 mg tablet Commonly known as:  ZYRTEC  
TAKE 1 TABLET BY MOUTH DAILY  
  
 cranberry extract 500 mg Cap capsule Take 1 Cap by mouth daily. DEPAKOTE  mg ER tablet Generic drug:  divalproex ER Take 500 mg by mouth three (3) times daily. drospirenone-ethinyl estradiol 3-0.02 mg Tab Commonly known as:  ANAYA Take 1 Tab by mouth daily. FLONASE 50 mcg/actuation nasal spray Generic drug:  fluticasone 2 Sprays by Both Nostrils route daily. hydrOXYzine HCl 25 mg tablet Commonly known as:  ATARAX Take 25 mg by mouth two (2) times daily as needed for Anxiety (agitation). levothyroxine 50 mcg tablet Commonly known as:  SYNTHROID  
TAKE 1 TABLET BY MOUTH BEFORE BREAKFAST * risperiDONE 2 mg tablet Commonly known as:  RisperDAL Take 2 mg by mouth daily. * risperiDONE 3 mg tablet Commonly known as:  RisperDAL Take 3 mg by mouth nightly. topiramate 200 mg tablet Commonly known as:  TOPAMAX Take 200 mg by mouth nightly. traZODone 100 mg tablet Commonly known as:  [place] Dessert Take 200 mg by mouth nightly. zolpidem 5 mg tablet Commonly known as:  AMBIEN Take 5 mg by mouth nightly as needed for Sleep. * Notice: This list has 2 medication(s) that are the same as other medications prescribed for you. Read the directions carefully, and ask your doctor or other care provider to review them with you. We Performed the Following REFERRAL TO UROLOGY [SFN593 Custom] Follow-up Instructions Return if symptoms worsen or fail to improve. Referral Information Referral ID Referred By Referred To  
  
 [id number] [facility] [address], [address] Visits Status Start Date End Date 1 New Request 7/18/18 7/18/19 If your referral has a status of pending review or denied, additional information will be sent to support the outcome of this decision. Introducing [facility]! [employer] introduces [email or web address] patient portal. Now you can access parts of your medical record, email your doctor's office, and request medication refills online. 1. In your internet browser, go to [email or web address] 2. Click on the First Time User? Click Here link in the Sign In box. You will see the New Member Sign Up page. 3. Enter your [email or web address] Access Code exactly as it appears below. You will not need to use this code after youve completed the sign-up process. If you do not sign up before the expiration date, you must request a new code. · [email or web address] Access Code: [email or web address] Expires: 10/16/2018  2:46 PM 
 
4. Enter the last four digits of your Social Security Number (xxxx) and Date of Birth (mm/dd/yyyy) as indicated and click Submit. You will be taken to the next sign-up page. 5. Create a [email or web address] ID. This will be your [email or web address] login ID and cannot be changed, so think of one that is secure and easy to remember. 6. Create a [employer].com password. You can change your password at any time. 7. Enter your Password Reset Question and Answer. This can be used at a later time if you forget your password. 8. Enter your e-mail address. You will receive e-mail notification when new information is available in [address] 9. Click Sign Up. You can now view and download portions of your medical record. 10. Click the Download Summary menu link to download a portable copy of your medical information. If you have questions, please visit the Frequently Asked Questions section of the [email or web address] website. Remember, [email or web address] is NOT to be used for urgent needs. For medical emergencies, dial 911. Now available from your iPhone and Android! Please provide this summary of care documentation to your next provider. Your primary care clinician is listed as Lexy Bell. If you have any questions after today's visit, please call [phone].

## 2018-07-18 NOTE — PROGRESS NOTES
1. Have you been to the ER, urgent care clinic since your last visit? Hospitalized since your last visit? No    2. Have you seen or consulted any other health care providers outside of the [address] [place] [address] since your last visit? Include any pap smears or colon screening.  No   Chief Complaint   Patient presents with    Referral Follow Up     Urology

## 2018-08-30 ENCOUNTER — OFFICE VISIT (OUTPATIENT)
Dept: FAMILY MEDICINE CLINIC | Age: 33
End: 2018-08-30

## 2018-08-30 VITALS — OXYGEN SATURATION: 96 % | HEIGHT: 64 IN | WEIGHT: 124.5 LBS | HEART RATE: 102 BPM | BODY MASS INDEX: 21.25 KG/M2

## 2018-08-30 DIAGNOSIS — B35.3 TINEA PEDIS OF BOTH FEET: Primary | ICD-10-CM

## 2018-08-30 RX ORDER — CLOTRIMAZOLE AND BETAMETHASONE DIPROPIONATE 10; .64 MG/G; MG/G
CREAM TOPICAL 2 TIMES DAILY
Qty: 45 G | Refills: 0 | Status: SHIPPED | OUTPATIENT
Start: 2018-08-30 | End: 2018-09-11

## 2018-08-30 RX ORDER — CLOTRIMAZOLE AND BETAMETHASONE DIPROPIONATE 10; .64 MG/G; MG/G
CREAM TOPICAL 2 TIMES DAILY
Qty: 15 G | Refills: 0 | Status: SHIPPED | OUTPATIENT
Start: 2018-08-30 | End: 2018-08-30 | Stop reason: SDUPTHER

## 2018-08-30 NOTE — PROGRESS NOTES
Chief Complaint   Patient presents with    Rash     \"top of foot\"    LOW BACK PAIN     \"acute\"     Pt seen in the office today with caregiver for a rash on top of foot  Caregiver states pt has been exhibiting abnormal behaviors such pinching her face, aggressiviness  Pt with aggressive behaviors, pulling pulse ox, b/p cuff off and yanking thermometer throwing it to the floor.

## 2018-08-30 NOTE — PROGRESS NOTES
Inderjit Bermudez is a 29 y.o. female   Chief Complaint   Patient presents with    Rash     \"top of foot\"    LOW BACK PAIN     \"acute\"    pt here for L foot rash and staff thinks it started maybe a week ago and pt is scratching at it. Pt would not allow vitals to be taken. Pt has this on both feet. Pt also has dry skin of b/l feet. she is a 29y.o. year old female who presents for evalution. Reviewed PmHx, RxHx, FmHx, SocHx, AllgHx and updated and dated in the chart. Review of Systems - negative except as listed above in the HPI    Objective:     Vitals:    08/30/18 1621   Pulse: (!) 102   SpO2: 96%   Weight: 124 lb 8 oz (56.5 kg)   Height: 5' 4\" (1.626 m)       Current Outpatient Prescriptions   Medication Sig    clotrimazole-betamethasone (LOTRISONE) topical cream Apply  to affected area two (2) times a day. X 2 weeks, cancel 15 g Rx    levothyroxine (SYNTHROID) 50 mcg tablet TAKE 1 TABLET BY MOUTH BEFORE BREAKFAST    cranberry extract 500 mg cap capsule Take 1 Cap by mouth daily.  cetirizine (ZYRTEC) 10 mg tablet TAKE 1 TABLET BY MOUTH DAILY    risperiDONE (RISPERDAL) 3 mg tablet Take 3 mg by mouth nightly.  hydrOXYzine HCl (ATARAX) 25 mg tablet Take 25 mg by mouth two (2) times daily as needed for Anxiety (agitation).  divalproex ER (DEPAKOTE ER) 500 mg ER tablet Take 500 mg by mouth three (3) times daily.  risperiDONE (RISPERDAL) 2 mg tablet Take 2 mg by mouth daily.  topiramate (TOPAMAX) 200 mg tablet Take 200 mg by mouth nightly.  fluticasone (FLONASE) 50 mcg/actuation nasal spray 2 Sprays by Both Nostrils route daily.  drospirenone-ethinyl estradiol (ANAYA) 3-0.02 mg tab Take 1 Tab by mouth daily.  zolpidem (AMBIEN) 5 mg tablet Take 5 mg by mouth nightly as needed for Sleep.  traZODone (DESYREL) 100 mg tablet Take 200 mg by mouth nightly.  benztropine (COGENTIN) 1 mg tablet Take 1 Tab by mouth two (2) times a day.      No current facility-administered medications for this visit. Physical Examination: Skin - xerosis of skin b/l dorsal feet with scattered round erythematous lesions approx size of half dollar with irregular borders      Assessment/ Plan:   Diagnoses and all orders for this visit:    1. Tinea pedis of both feet  -     clotrimazole-betamethasone (LOTRISONE) topical cream; Apply  to affected area two (2) times a day. X 2 weeks, cancel 15 g Rx       Follow-up Disposition:  Return if symptoms worsen or fail to improve. I have discussed the diagnosis with the patient and the intended plan as seen in the above orders. The patient has received an after-visit summary and questions were answered concerning future plans. Pt conveyed understanding of plan.     Medication Side Effects and Warnings were discussed with patient      Miladys Nolasco DO

## 2018-08-30 NOTE — PATIENT INSTRUCTIONS
Athlete's Foot: Care Instructions  Your Care Instructions    Athlete's foot is an itchy rash on the foot caused by an infection with a fungus. You can get it by going barefoot in wet public areas, such as swimming pools or locker rooms. Many times there is no clear reason why you get athlete's foot. You can easily treat athlete's foot by putting medicine on your feet for 1 to 6 weeks. In some cases, a doctor may prescribe pills to kill the fungus. Follow-up care is a key part of your treatment and safety. Be sure to make and go to all appointments, and call your doctor if you are having problems. It's also a good idea to know your test results and keep a list of the medicines you take. How can you care for yourself at home? · Your doctor may suggest an over-the counter lotion or spray or may prescribe a medicine. Take your medicines exactly as prescribed. Call your doctor if you think you are having a problem with your medicine. · Keep your feet clean and dry. · When you get dressed, put your socks on before your underwear. This can prevent the fungus from spreading from your feet to your groin. To prevent athlete's foot  · Wear flip-flops or other shower sandals in public locker rooms and showers and by the pool. · Dry between your toes after swimming or bathing. · Wear leather shoes or sandals, which let air get to your feet. · Change your socks as needed so your feet stay as dry as possible. · Use antifungal powder on your feet. When should you call for help? Watch closely for changes in your health, and be sure to contact your doctor if:    · You do not get better as expected. Where can you learn more? Go to http://sherrell-savage.info/. Enter M498 in the search box to learn more about \"Athlete's Foot: Care Instructions. \"  Current as of: October 5, 2017  Content Version: 11.7  © 1961-4360 [employer].  Care instructions adapted under license by [employer] (which disclaims liability or warranty for this information). If you have questions about a medical condition or this instruction, always ask your healthcare professional. [address] any warranty or liability for your use of this information.

## 2018-08-30 NOTE — MR AVS SNAPSHOT
[address] [address] 
[phone] Patient: Madison Harley MRN: [id number] ZKN:[id number] Visit Information Date & Time Provider Department Dept. Phone Encounter #  
 8/30/2018  3:45 PM Dianna PatelMichelle [phone] [id number] Upcoming Health Maintenance Date Due Pneumococcal 19-64 Highest Risk (1 of 3 - PCV13) 1/31/2005 PAP AKA CERVICAL CYTOLOGY 5/27/2018 Influenza Age 5 to Adult 8/1/2018 DTaP/Tdap/Td series (2 - Td) 2/4/2025 Allergies as of 8/30/2018  Review Complete On: 8/30/2018 By: Dianna Patel, DO Severity Noted Reaction Type Reactions Bactrim [Sulfamethoprim]  01/03/2018    Other (comments) Dehydration Phenobarbital  08/24/2011    Unknown (comments) Unable to obtain Current Immunizations  Reviewed on 2/9/2016 Name Date Influenza Vaccine (Quad) PF 4/5/2018  4:58 PM, 11/13/2014 TB Skin Test (PPD) Intradermal 2/9/2016, 2/4/2014, 2/5/2013 Tdap 2/4/2015 Not reviewed this visit You Were Diagnosed With   
  
 Codes Comments Tinea pedis of both feet    -  Primary ICD-10-CM: B35.3 ICD-9-CM: 110.4 Vitals Pulse Height(growth percentile) Weight(growth percentile) SpO2 BMI OB Status (!) 102 5' 4\" (1.626 m) 124 lb 8 oz (56.5 kg) 96% 21.37 kg/m2 Medically Induced Smoking Status Never Smoker Vitals History BMI and BSA Data Body Mass Index Body Surface Area  
 21.37 kg/m 2 1.6 m 2 Preferred Pharmacy Pharmacy Name Phone [facility], [address] [phone] Your Updated Medication List  
  
   
This list is accurate as of 8/30/18  4:44 PM.  Always use your most recent med list.  
  
  
  
  
 benztropine 1 mg tablet Commonly known as:  COGENTIN Take 1 Tab by mouth two (2) times a day. cetirizine 10 mg tablet Commonly known as:  ZYRTEC  
TAKE 1 TABLET BY MOUTH DAILY  
  
 clotrimazole-betamethasone topical cream  
Commonly known as:  Hope Lipa Apply  to affected area two (2) times a day. X 2 weeks, cancel 15 g Rx  
  
 cranberry extract 500 mg Cap capsule Take 1 Cap by mouth daily. DEPAKOTE  mg ER tablet Generic drug:  divalproex ER Take 500 mg by mouth three (3) times daily. drospirenone-ethinyl estradiol 3-0.02 mg Tab Commonly known as:  ANAYA Take 1 Tab by mouth daily. FLONASE 50 mcg/actuation nasal spray Generic drug:  fluticasone 2 Sprays by Both Nostrils route daily. hydrOXYzine HCl 25 mg tablet Commonly known as:  ATARAX Take 25 mg by mouth two (2) times daily as needed for Anxiety (agitation). levothyroxine 50 mcg tablet Commonly known as:  SYNTHROID  
TAKE 1 TABLET BY MOUTH BEFORE BREAKFAST * risperiDONE 2 mg tablet Commonly known as:  RisperDAL Take 2 mg by mouth daily. * risperiDONE 3 mg tablet Commonly known as:  RisperDAL Take 3 mg by mouth nightly. topiramate 200 mg tablet Commonly known as:  TOPAMAX Take 200 mg by mouth nightly. traZODone 100 mg tablet Commonly known as:  Cristobal [place] Take 200 mg by mouth nightly. zolpidem 5 mg tablet Commonly known as:  AMBIEN Take 5 mg by mouth nightly as needed for Sleep. * Notice: This list has 2 medication(s) that are the same as other medications prescribed for you. Read the directions carefully, and ask your doctor or other care provider to review them with you. Prescriptions Sent to Pharmacy Refills  
 clotrimazole-betamethasone (LOTRISONE) topical cream 0 Sig: Apply  to affected area two (2) times a day. X 2 weeks, cancel 15 g Rx Class: Normal  
 Pharmacy: [address] [place] [address] #: [phone] Route: Topical  
  
Patient Instructions Athlete's Foot: Care Instructions Your Care Instructions Athlete's foot is an itchy rash on the foot caused by an infection with a fungus. You can get it by going barefoot in wet public areas, such as swimming pools or locker rooms. Many times there is no clear reason why you get athlete's foot. You can easily treat athlete's foot by putting medicine on your feet for 1 to 6 weeks. In some cases, a doctor may prescribe pills to kill the fungus. Follow-up care is a key part of your treatment and safety. Be sure to make and go to all appointments, and call your doctor if you are having problems. It's also a good idea to know your test results and keep a list of the medicines you take. How can you care for yourself at home? · Your doctor may suggest an over-the counter lotion or spray or may prescribe a medicine. Take your medicines exactly as prescribed. Call your doctor if you think you are having a problem with your medicine. · Keep your feet clean and dry. · When you get dressed, put your socks on before your underwear. This can prevent the fungus from spreading from your feet to your groin. To prevent athlete's foot · Wear flip-flops or other shower sandals in public locker rooms and showers and by the pool. · Dry between your toes after swimming or bathing. · Wear leather shoes or sandals, which let air get to your feet. · Change your socks as needed so your feet stay as dry as possible. · Use antifungal powder on your feet. When should you call for help? Watch closely for changes in your health, and be sure to contact your doctor if: 
  · You do not get better as expected. Where can you learn more? Go to http://sherrell-savage.info/. Enter M498 in the search box to learn more about \"Athlete's Foot: Care Instructions. \" Current as of: October 5, 2017 Content Version: 11.7 © 4569-4989 [employer].  Care instructions adapted under license by [address] (which disclaims liability or warranty for this information). If you have questions about a medical condition or this instruction, always ask your healthcare professional. [employer] [place] any warranty or liability for your use of this information. Introducing [facility]! [facility] introduces Your Policy Manager patient portal. Now you can access parts of your medical record, email your doctor's office, and request medication refills online. 1. In your internet browser, go to [email or web address] 2. Click on the First Time User? Click Here link in the Sign In box. You will see the New Member Sign Up page. 3. Enter your Your [employer] Manager Access Code exactly as it appears below. You will not need to use this code after youve completed the sign-up process. If you do not sign up before the expiration date, you must request a new code. · Your Policy Manager Access Code: [email or web address] Expires: 10/16/2018  2:46 PM 
 
4. Enter the last four digits of your Social Security Number (xxxx) and Date of Birth (mm/dd/yyyy) as indicated and click Submit. You will be taken to the next sign-up page. 5. Create a Your Policy Manager ID. This will be your Your Policy Manager login ID and cannot be changed, so think of one that is secure and easy to remember. 6. Create a Your [employer] Manager password. You can change your password at any time. 7. Enter your Password Reset Question and Answer. This can be used at a later time if you forget your password. 8. Enter your e-mail address. You will receive e-mail notification when new information is available in [address] 9. Click Sign Up. You can now view and download portions of your medical record. 10. Click the Download Summary menu link to download a portable copy of your medical information. If you have questions, please visit the Frequently Asked Questions section of the Your Policy Manager website.  Remember, Your [employer] Manager is NOT to be used for urgent needs. For medical emergencies, dial 911. Now available from your iPhone and Android! Please provide this summary of care documentation to your next provider. Your primary care clinician is listed as Mandie Cassidy. If you have any questions after today's visit, please call [phone].

## 2018-09-06 ENCOUNTER — DOCUMENTATION ONLY (OUTPATIENT)
Dept: FAMILY MEDICINE CLINIC | Age: 33
End: 2018-09-06

## 2018-09-06 NOTE — PROGRESS NOTES
Clotrimazole-betamethasone cream submitted to [facility] via Cover my meds. Awaiting reponse.    Clotrimazole-betamethasone cream  PA Case ID: [id number]

## 2018-09-06 NOTE — PROGRESS NOTES
PT may be required to use one of the following medications:   NYSTATIN CREAM (G) 903512/G   NYSTATIN OINT. (G) 492410/G   NYSTATIN POWDER 531137/G   KETOCONAZOLE CREAM (G) 2 %   TOLNAFTATE CREAM (G) 1 %   CLOTRIMAZOLE CREAM (G) 1 %   CLOTRIMAZOLE SOLUTION 1 %   MICONAZOLE NITRATE CREAM (G) 2 %   KETOCONAZOLE SHAMPOO 2 %   TERBINAFINE CREAM (G) 1 %   NYSTATIN-TRIAMCINOLONE CREAM (G) 892140-4.1   NYSTATIN-TRIAMCINOLONE OINT. (G) 449177-2.1   MICONAZOLE NITRATE SPRAY 2 %

## 2018-09-11 RX ORDER — KETOCONAZOLE 20 MG/G
CREAM TOPICAL 2 TIMES DAILY
Qty: 60 G | Refills: 0 | Status: ON HOLD | OUTPATIENT
Start: 2018-09-11 | End: 2022-09-28

## 2018-10-12 RX ORDER — CETIRIZINE HCL 10 MG
TABLET ORAL
Qty: 31 TAB | Status: SHIPPED | OUTPATIENT
Start: 2018-10-12 | End: 2019-10-15 | Stop reason: SDUPTHER

## 2018-10-12 RX ORDER — DOCUSATE SODIUM 100 MG/1
CAPSULE, LIQUID FILLED ORAL
Qty: 62 CAP | Status: SHIPPED | OUTPATIENT
Start: 2018-10-12 | End: 2022-10-12

## 2018-12-05 ENCOUNTER — OFFICE VISIT (OUTPATIENT)
Dept: FAMILY MEDICINE CLINIC | Age: 33
End: 2018-12-05

## 2018-12-05 VITALS
WEIGHT: 122 LBS | BODY MASS INDEX: 20.83 KG/M2 | HEIGHT: 64 IN | DIASTOLIC BLOOD PRESSURE: 78 MMHG | HEART RATE: 71 BPM | SYSTOLIC BLOOD PRESSURE: 113 MMHG | RESPIRATION RATE: 16 BRPM | OXYGEN SATURATION: 100 % | TEMPERATURE: 97.8 F

## 2018-12-05 DIAGNOSIS — F69 BEHAVIOR PROBLEM, ADULT: ICD-10-CM

## 2018-12-05 DIAGNOSIS — Z51.81 MEDICATION MONITORING ENCOUNTER: ICD-10-CM

## 2018-12-05 DIAGNOSIS — D64.9 ANEMIA, UNSPECIFIED TYPE: ICD-10-CM

## 2018-12-05 DIAGNOSIS — R35.0 URINARY FREQUENCY: ICD-10-CM

## 2018-12-05 DIAGNOSIS — F72 MR (MENTAL RETARDATION), SEVERE: Primary | ICD-10-CM

## 2018-12-05 DIAGNOSIS — F31.78 BIPOLAR DISORDER, IN FULL REMISSION, MOST RECENT EPISODE MIXED (HCC): ICD-10-CM

## 2018-12-05 DIAGNOSIS — Z23 ENCOUNTER FOR IMMUNIZATION: ICD-10-CM

## 2018-12-05 NOTE — PROGRESS NOTES
Chief Complaint   Patient presents with    Labs     Non Fasting    Follow-up     Bladder Infection     she is a 29y.o. year old female who presents for evalution. Pt here for labs per psychiatrist.  Would like thyroid and all labs rechecked today. Pt doing well at home, no behavioral issues at this time. Pt also has been having urinary frequency but not a lot of urine coming out. Present for past week. Course is constant. No fever or chills, no complaints of back or abd/pelvic pain. Would like flu shot today. Reviewed PmHx, RxHx, FmHx, SocHx, AllgHx and updated and dated in the chart. Review of Systems - negative except as listed above in the HPI    Objective:     Vitals:    12/05/18 0956   BP: 113/78   Pulse: 71   Resp: 16   Temp: 97.8 °F (36.6 °C)   TempSrc: Oral   SpO2: 100%   Weight: 122 lb (55.3 kg)   Height: 5' 4\" (1.626 m)     Physical Examination: General appearance - alert, well appearing, and in no distress  Chest - clear to auscultation, no wheezes, rales or rhonchi, symmetric air entry  Heart - normal rate, regular rhythm, normal S1, S2, no murmurs, rubs, clicks or gallops  Abdomen - soft, nontender, nondistended, no masses or organomegaly  no CVA tenderness  Neurological - abnormal neurological exam unchanged from prior examinations    Assessment/ Plan:   Diagnoses and all orders for this visit:    1. MR (mental retardation), severe  -     CBC WITH AUTOMATED DIFF  -     TSH 3RD GENERATION  -     METABOLIC PANEL, COMPREHENSIVE  Will forward labs to psych after our review. 2. Behavior problem, adult  -     CBC WITH AUTOMATED DIFF  -     TSH 3RD GENERATION  -     METABOLIC PANEL, COMPREHENSIVE    3. Bipolar disorder, in full remission, most recent episode mixed ([facility] [address]  -     CBC WITH AUTOMATED DIFF  -     TSH 3RD GENERATION  -     METABOLIC PANEL, COMPREHENSIVE  Stable, cont F/U with psychiatry.      4. Medication monitoring encounter  -     CBC WITH AUTOMATED DIFF  -     TSH 3RD GENERATION  -     METABOLIC PANEL, COMPREHENSIVE    5. Urinary frequency  Pt unable to leave urine sample at this time. Caregiver states if problem persistss will bring her back. 6. Encounter for immunization  -     INFLUENZA VIRUS VAC QUAD,SPLIT,PRESV FREE SYRINGE IM  -     ND IMMUNIZ ADMIN,1 SINGLE/COMB VAC/TOXOID    7. Anemia, unspecified type  -     CBC WITH AUTOMATED DIFF  Will decide on F/U after reviewing labs. Pt voiced understanding regarding plan of care. Follow-up Disposition:  Return if symptoms worsen or fail to improve. I have discussed the diagnosis with the patient and the intended plan as seen in the above orders. The patient has received an after-visit summary and questions were answered concerning future plans.      Medication Side Effects and Warnings were discussed with patient    Adelene Oppenheim, NP

## 2018-12-05 NOTE — PROGRESS NOTES
1. Have you been to the ER, urgent care clinic since your last visit? Hospitalized since your last visit? No    2. Have you seen or consulted any other health care providers outside of the [address] [place] [address] since your last visit? Include any pap smears or colon screening.  No   Chief Complaint   Patient presents with    Labs     Non Fasting    Follow-up     Bladder Infection

## 2018-12-06 LAB
ALBUMIN SERPL-MCNC: 4.1 G/DL (ref 3.5–5.5)
ALBUMIN/GLOB SERPL: 1.2 {RATIO} (ref 1.2–2.2)
ALP SERPL-CCNC: 55 IU/L (ref 39–117)
ALT SERPL-CCNC: 9 IU/L (ref 0–32)
AST SERPL-CCNC: 13 IU/L (ref 0–40)
BASOPHILS # BLD AUTO: 0 X10E3/UL (ref 0–0.2)
BASOPHILS NFR BLD AUTO: 0 %
BILIRUB SERPL-MCNC: 0.3 MG/DL (ref 0–1.2)
BILIRUB UR QL STRIP: NEGATIVE
BUN SERPL-MCNC: 26 MG/DL (ref 6–20)
BUN/CREAT SERPL: 16 (ref 9–23)
CALCIUM SERPL-MCNC: 9.6 MG/DL (ref 8.7–10.2)
CHLORIDE SERPL-SCNC: 105 MMOL/L (ref 96–106)
CO2 SERPL-SCNC: 22 MMOL/L (ref 20–29)
CREAT SERPL-MCNC: 1.59 MG/DL (ref 0.57–1)
EOSINOPHIL # BLD AUTO: 0.1 X10E3/UL (ref 0–0.4)
EOSINOPHIL NFR BLD AUTO: 1 %
ERYTHROCYTE [DISTWIDTH] IN BLOOD BY AUTOMATED COUNT: 14.4 % (ref 12.3–15.4)
GLOBULIN SER CALC-MCNC: 3.4 G/DL (ref 1.5–4.5)
GLUCOSE SERPL-MCNC: 84 MG/DL (ref 65–99)
GLUCOSE UR-MCNC: NEGATIVE MG/DL
HCT VFR BLD AUTO: 38.2 % (ref 34–46.6)
HGB BLD-MCNC: 12.5 G/DL (ref 11.1–15.9)
IMM GRANULOCYTES # BLD: 0 X10E3/UL (ref 0–0.1)
IMM GRANULOCYTES NFR BLD: 0 %
INTERPRETATION: NORMAL
KETONES P FAST UR STRIP-MCNC: NEGATIVE MG/DL
LYMPHOCYTES # BLD AUTO: 2.3 X10E3/UL (ref 0.7–3.1)
LYMPHOCYTES NFR BLD AUTO: 42 %
MCH RBC QN AUTO: 28 PG (ref 26.6–33)
MCHC RBC AUTO-ENTMCNC: 32.7 G/DL (ref 31.5–35.7)
MCV RBC AUTO: 86 FL (ref 79–97)
MONOCYTES # BLD AUTO: 0.4 X10E3/UL (ref 0.1–0.9)
MONOCYTES NFR BLD AUTO: 7 %
NEUTROPHILS # BLD AUTO: 2.6 X10E3/UL (ref 1.4–7)
NEUTROPHILS NFR BLD AUTO: 50 %
PH UR STRIP: 6.5 [PH] (ref 4.6–8)
PLATELET # BLD AUTO: 147 X10E3/UL (ref 150–379)
POTASSIUM SERPL-SCNC: 4.1 MMOL/L (ref 3.5–5.2)
PROT SERPL-MCNC: 7.5 G/DL (ref 6–8.5)
PROT UR QL STRIP: NEGATIVE
RBC # BLD AUTO: 4.47 X10E6/UL (ref 3.77–5.28)
SODIUM SERPL-SCNC: 143 MMOL/L (ref 134–144)
SP GR UR STRIP: 1.01 (ref 1–1.03)
TSH SERPL DL<=0.005 MIU/L-ACNC: 0.53 UIU/ML (ref 0.45–4.5)
UA UROBILINOGEN AMB POC: NORMAL (ref 0.2–1)
URINALYSIS CLARITY POC: CLEAR
URINALYSIS COLOR POC: YELLOW
URINE BLOOD POC: NEGATIVE
URINE LEUKOCYTES POC: NORMAL
URINE NITRITES POC: NEGATIVE
WBC # BLD AUTO: 5.3 X10E3/UL (ref 3.4–10.8)

## 2018-12-06 NOTE — PROGRESS NOTES
Please inform caregivers labs show:   1. Platelets slightly decreased, recheck 1 mo. 2.  Dehydration with elevated kidney function, work on pushing fluids and also recheck 1 mo.   If no improvement may need to F/U with kidney specialist.   3.  Thyroid test normal.   Thanks,  N

## 2018-12-07 LAB — BACTERIA UR CULT: NORMAL

## 2019-05-13 RX ORDER — ASCORBIC ACID 1000 MG
TABLET ORAL
Qty: 31 CAP | Status: SHIPPED | OUTPATIENT
Start: 2019-05-13 | End: 2020-05-15 | Stop reason: SDUPTHER

## 2019-06-25 RX ORDER — POLYETHYLENE GLYCOL 3350 17 G/17G
POWDER, FOR SOLUTION ORAL
Qty: 527 G | Refills: 11 | Status: ON HOLD | OUTPATIENT
Start: 2019-06-25 | End: 2022-09-28

## 2019-06-26 DIAGNOSIS — E03.1 CONGENITAL HYPOTHYROIDISM WITHOUT GOITER: ICD-10-CM

## 2019-06-26 RX ORDER — LEVOTHYROXINE SODIUM 50 UG/1
TABLET ORAL
Qty: 31 TAB | Refills: 10 | Status: SHIPPED | OUTPATIENT
Start: 2019-06-26 | End: 2020-05-15 | Stop reason: SDUPTHER

## 2019-08-29 RX ORDER — DROSPIRENONE AND ETHINYL ESTRADIOL 0.02-3(28)
KIT ORAL
Qty: 28 TAB | Refills: 5 | Status: SHIPPED | OUTPATIENT
Start: 2019-08-29 | End: 2020-01-24 | Stop reason: SDUPTHER

## 2019-10-15 RX ORDER — CETIRIZINE HCL 10 MG
TABLET ORAL
Qty: 31 TAB | Status: SHIPPED | OUTPATIENT
Start: 2019-10-15 | End: 2019-10-18 | Stop reason: SDUPTHER

## 2019-10-20 RX ORDER — CETIRIZINE HCL 10 MG
TABLET ORAL
Qty: 31 TAB | Status: ON HOLD | OUTPATIENT
Start: 2019-10-20 | End: 2022-09-28

## 2019-10-20 RX ORDER — DOCUSATE SODIUM 100 MG/1
CAPSULE, LIQUID FILLED ORAL
Qty: 62 CAP | Status: SHIPPED | OUTPATIENT
Start: 2019-10-20 | End: 2020-10-16

## 2019-12-16 ENCOUNTER — OFFICE VISIT (OUTPATIENT)
Dept: FAMILY MEDICINE CLINIC | Age: 34
End: 2019-12-16

## 2019-12-16 VITALS
OXYGEN SATURATION: 94 % | HEART RATE: 80 BPM | WEIGHT: 117 LBS | RESPIRATION RATE: 20 BRPM | BODY MASS INDEX: 19.97 KG/M2 | HEIGHT: 64 IN

## 2019-12-16 DIAGNOSIS — R35.0 URINARY FREQUENCY: Primary | ICD-10-CM

## 2019-12-16 DIAGNOSIS — T14.8XXA BRUISING: ICD-10-CM

## 2019-12-16 RX ORDER — CIPROFLOXACIN 500 MG/1
500 TABLET ORAL 2 TIMES DAILY
Qty: 10 TAB | Refills: 0 | Status: SHIPPED | OUTPATIENT
Start: 2019-12-16 | End: 2019-12-21

## 2019-12-16 NOTE — PROGRESS NOTES
Chief Complaint   Patient presents with    UTI    Fatigue     Pt in office today for uti  -[facility] and they gave her Macrobid  caregiver states that the pt has been urinating on herself which is abnormal for the pt  -pt has been having bruising on herself  -caregiver states that pt is always being watched    1. Have you been to the ER, urgent care clinic since your last visit? Hospitalized since your last visit? Yes [facility]: [facility]    2. Have you seen or consulted any other health care providers outside of the [address] [place] [address] since your last visit? Include any pap smears or colon screening.  No     Pt has no other concerns

## 2019-12-16 NOTE — PROGRESS NOTES
HISTORY OF PRESENT ILLNESS  Julián Del Toro is a 36 y.o. female. HPI  Pt in office today for uti  -[facility] and they gave her Macrobid  caregiver states that the pt has been urinating on herself which is abnormal for the pt    -pt has been having bruising on herself  -caregiver states that pt is always being watched  No explanation for the bruising  She is due for labs, no known pmh of anemia    ROS  A comprehensive review of system was obtained and negative except findings in the HPI    Visit Vitals  Pulse 80   Resp 20   Ht 5' 4\" (1.626 m)   Wt 117 lb (53.1 kg)   SpO2 94%   BMI 20.08 kg/m²     Physical Exam  Vitals signs and nursing note reviewed. Constitutional:       Appearance: She is well-developed. Comments:      Neck:      Vascular: No JVD. Cardiovascular:      Rate and Rhythm: Normal rate and regular rhythm. Heart sounds: No murmur. No friction rub. No gallop. Pulmonary:      Effort: Pulmonary effort is normal. No respiratory distress. Breath sounds: Normal breath sounds. No wheezing. Skin:     General: Skin is warm. Comments: Top of left foot with faint bruise   Neurological:      Mental Status: She is alert. Mental status is at baseline. Comments: Flat affect for her         ASSESSMENT and PLAN  Encounter Diagnoses   Name Primary?  Urinary frequency Yes    Bruising      Orders Placed This Encounter    ciprofloxacin HCl (CIPRO) 500 mg tablet     Given cipro 500mg bid x 5 days  Will come in next week for urine recheck and labs to eval for bruising    I have discussed the diagnosis with the patient and the intended plan as seen in the above orders. The patient has received an after-visit summary and questions were answered concerning future plans. Patient conveyed understanding of the plan at the time of the visit.     Jaswinder Patricia, MSN, ANP  12/16/2019

## 2020-01-26 RX ORDER — DROSPIRENONE AND ETHINYL ESTRADIOL 0.02-3(28)
KIT ORAL
Qty: 28 TAB | Refills: 5 | Status: SHIPPED | OUTPATIENT
Start: 2020-01-26 | End: 2020-06-25

## 2020-05-14 DIAGNOSIS — E03.1 CONGENITAL HYPOTHYROIDISM WITHOUT GOITER: ICD-10-CM

## 2020-05-15 RX ORDER — LEVOTHYROXINE SODIUM 50 UG/1
TABLET ORAL
Qty: 31 TAB | Refills: 11 | Status: ON HOLD | OUTPATIENT
Start: 2020-05-15 | End: 2022-09-28

## 2020-05-15 RX ORDER — ASCORBIC ACID 1000 MG
TABLET ORAL
Qty: 31 CAP | Refills: 11 | Status: SHIPPED | OUTPATIENT
Start: 2020-05-15 | End: 2021-05-12

## 2020-05-18 RX ORDER — CHLORHEXIDINE GLUCONATE 1.2 MG/ML
15 RINSE ORAL EVERY 12 HOURS
Qty: 420 ML | Refills: 1 | Status: SHIPPED | OUTPATIENT
Start: 2020-05-18 | End: 2020-10-12

## 2020-06-25 RX ORDER — DROSPIRENONE AND ETHINYL ESTRADIOL 0.02-3(28)
KIT ORAL
Qty: 28 TAB | Refills: 0 | Status: SHIPPED | OUTPATIENT
Start: 2020-06-25 | End: 2020-09-16

## 2020-08-19 RX ORDER — ACETAMINOPHEN 500 MG
TABLET ORAL
Qty: 30 TAB | Refills: 0 | Status: ON HOLD | OUTPATIENT
Start: 2020-08-19 | End: 2022-09-28

## 2020-09-16 RX ORDER — DROSPIRENONE AND ETHINYL ESTRADIOL 0.02-3(28)
KIT ORAL
Qty: 28 TAB | Refills: 0 | Status: SHIPPED | OUTPATIENT
Start: 2020-09-16 | End: 2020-09-20

## 2020-09-20 RX ORDER — DROSPIRENONE AND ETHINYL ESTRADIOL 0.02-3(28)
KIT ORAL
Qty: 28 TAB | Refills: 0 | Status: SHIPPED | OUTPATIENT
Start: 2020-09-20 | End: 2020-11-02

## 2020-10-12 RX ORDER — CHLORHEXIDINE GLUCONATE 1.2 MG/ML
RINSE ORAL
Qty: 473 ML | Refills: 11 | Status: ON HOLD | OUTPATIENT
Start: 2020-10-12 | End: 2022-09-28

## 2020-10-16 RX ORDER — DOCUSATE SODIUM 100 MG/1
CAPSULE, LIQUID FILLED ORAL
Qty: 62 CAP | Refills: 11 | Status: ON HOLD | OUTPATIENT
Start: 2020-10-16 | End: 2022-09-28

## 2020-11-02 RX ORDER — DROSPIRENONE AND ETHINYL ESTRADIOL 0.02-3(28)
KIT ORAL
Qty: 28 TAB | Refills: 12 | Status: SHIPPED | OUTPATIENT
Start: 2020-11-02 | End: 2020-11-02

## 2020-11-02 RX ORDER — DROSPIRENONE AND ETHINYL ESTRADIOL 0.02-3(28)
KIT ORAL
Qty: 28 TAB | Refills: 12 | Status: SHIPPED | OUTPATIENT
Start: 2020-11-02 | End: 2021-10-18

## 2021-04-14 ENCOUNTER — TRANSCRIBE ORDER (OUTPATIENT)
Dept: SCHEDULING | Age: 36
End: 2021-04-14

## 2021-04-14 DIAGNOSIS — N18.31 CHRONIC KIDNEY DISEASE (CKD) STAGE G3A/A1, MODERATELY DECREASED GLOMERULAR FILTRATION RATE (GFR) BETWEEN 45-59 ML/MIN/1.73 SQUARE METER AND ALBUMINURIA CREATININE RATIO LESS THAN 30 MG/G (HCC): ICD-10-CM

## 2021-04-14 DIAGNOSIS — I10 ESSENTIAL HYPERTENSION, MALIGNANT: Primary | ICD-10-CM

## 2021-04-15 ENCOUNTER — TRANSCRIBE ORDER (OUTPATIENT)
Dept: SCHEDULING | Age: 36
End: 2021-04-15

## 2021-04-15 DIAGNOSIS — I10 ESSENTIAL HYPERTENSION, MALIGNANT: Primary | ICD-10-CM

## 2021-04-15 DIAGNOSIS — N18.31 CHRONIC KIDNEY DISEASE (CKD) STAGE G3A/A1, MODERATELY DECREASED GLOMERULAR FILTRATION RATE (GFR) BETWEEN 45-59 ML/MIN/1.73 SQUARE METER AND ALBUMINURIA CREATININE RATIO LESS THAN 30 MG/G (HCC): ICD-10-CM

## 2021-05-12 RX ORDER — ASCORBIC ACID 1000 MG
TABLET ORAL
Qty: 31 CAP | Refills: 12 | Status: SHIPPED | OUTPATIENT
Start: 2021-05-12

## 2021-09-24 ENCOUNTER — TRANSCRIBE ORDER (OUTPATIENT)
Dept: SCHEDULING | Age: 36
End: 2021-09-24

## 2021-09-24 DIAGNOSIS — N18.31 STAGE 3A CHRONIC KIDNEY DISEASE (HCC): ICD-10-CM

## 2021-09-24 DIAGNOSIS — I10 ESSENTIAL HYPERTENSION, MALIGNANT: Primary | ICD-10-CM

## 2021-10-12 RX ORDER — DOCUSATE SODIUM 100 MG/1
CAPSULE, LIQUID FILLED ORAL
Qty: 62 CAPSULE | Refills: 12 | Status: ON HOLD | OUTPATIENT
Start: 2021-10-12 | End: 2022-09-28

## 2021-10-18 RX ORDER — DROSPIRENONE AND ETHINYL ESTRADIOL 0.02-3(28)
KIT ORAL
Qty: 28 TABLET | Refills: 11 | Status: SHIPPED | OUTPATIENT
Start: 2021-10-18

## 2022-03-20 PROBLEM — R10.9 ABDOMINAL PAIN: Status: ACTIVE | Noted: 2018-04-04

## 2022-09-25 ENCOUNTER — APPOINTMENT (OUTPATIENT)
Dept: CT IMAGING | Age: 37
DRG: 690 | End: 2022-09-25
Attending: STUDENT IN AN ORGANIZED HEALTH CARE EDUCATION/TRAINING PROGRAM
Payer: MEDICARE

## 2022-09-25 ENCOUNTER — HOSPITAL ENCOUNTER (EMERGENCY)
Age: 37
Discharge: HOME | DRG: 690 | End: 2022-09-26
Attending: EMERGENCY MEDICINE
Payer: MEDICARE

## 2022-09-25 DIAGNOSIS — N30.01 ACUTE CYSTITIS WITH HEMATURIA: Primary | ICD-10-CM

## 2022-09-25 LAB
ALBUMIN SERPL-MCNC: 2.2 G/DL (ref 3.5–5)
ALBUMIN/GLOB SERPL: 0.4 {RATIO} (ref 1.1–2.2)
ALP SERPL-CCNC: 57 U/L (ref 45–117)
ALT SERPL-CCNC: 14 U/L (ref 12–78)
ANION GAP SERPL CALC-SCNC: 7 MMOL/L (ref 5–15)
AST SERPL-CCNC: 27 U/L (ref 15–37)
BASOPHILS # BLD: 0 K/UL (ref 0–0.1)
BASOPHILS NFR BLD: 0 % (ref 0–1)
BILIRUB SERPL-MCNC: 0.4 MG/DL (ref 0.2–1)
BUN SERPL-MCNC: 35 MG/DL (ref 6–20)
BUN/CREAT SERPL: 21 (ref 12–20)
CALCIUM SERPL-MCNC: 9 MG/DL (ref 8.5–10.1)
CHLORIDE SERPL-SCNC: 105 MMOL/L (ref 97–108)
CO2 SERPL-SCNC: 25 MMOL/L (ref 21–32)
COMMENT, HOLDF: NORMAL
CREAT SERPL-MCNC: 1.67 MG/DL (ref 0.55–1.02)
DIFFERENTIAL METHOD BLD: ABNORMAL
EOSINOPHIL # BLD: 0 K/UL (ref 0–0.4)
EOSINOPHIL NFR BLD: 0 % (ref 0–7)
ERYTHROCYTE [DISTWIDTH] IN BLOOD BY AUTOMATED COUNT: 12 % (ref 11.5–14.5)
GLOBULIN SER CALC-MCNC: 5.3 G/DL (ref 2–4)
GLUCOSE SERPL-MCNC: 106 MG/DL (ref 65–100)
HCG UR QL: NEGATIVE
HCT VFR BLD AUTO: 35.2 % (ref 35–47)
HGB BLD-MCNC: 11.8 G/DL (ref 11.5–16)
IMM GRANULOCYTES # BLD AUTO: 0 K/UL (ref 0–0.04)
IMM GRANULOCYTES NFR BLD AUTO: 0 % (ref 0–0.5)
LACTATE BLD-SCNC: 1.08 MMOL/L (ref 0.4–2)
LYMPHOCYTES # BLD: 1.8 K/UL (ref 0.8–3.5)
LYMPHOCYTES NFR BLD: 16 % (ref 12–49)
MCH RBC QN AUTO: 29.3 PG (ref 26–34)
MCHC RBC AUTO-ENTMCNC: 33.5 G/DL (ref 30–36.5)
MCV RBC AUTO: 87.3 FL (ref 80–99)
MONOCYTES # BLD: 1.8 K/UL (ref 0–1)
MONOCYTES NFR BLD: 16 % (ref 5–13)
NEUTS SEG # BLD: 7.6 K/UL (ref 1.8–8)
NEUTS SEG NFR BLD: 68 % (ref 32–75)
NRBC # BLD: 0 K/UL (ref 0–0.01)
NRBC BLD-RTO: 0 PER 100 WBC
PLATELET # BLD AUTO: 156 K/UL (ref 150–400)
PMV BLD AUTO: 10.8 FL (ref 8.9–12.9)
POTASSIUM SERPL-SCNC: 3.3 MMOL/L (ref 3.5–5.1)
PROT SERPL-MCNC: 7.5 G/DL (ref 6.4–8.2)
RBC # BLD AUTO: 4.03 M/UL (ref 3.8–5.2)
SAMPLES BEING HELD,HOLD: NORMAL
SODIUM SERPL-SCNC: 137 MMOL/L (ref 136–145)
TROPONIN-HIGH SENSITIVITY: 31 NG/L (ref 0–51)
UR CULT HOLD, URHOLD: NORMAL
WBC # BLD AUTO: 11.2 K/UL (ref 3.6–11)

## 2022-09-25 PROCEDURE — 84484 ASSAY OF TROPONIN QUANT: CPT

## 2022-09-25 PROCEDURE — 99284 EMERGENCY DEPT VISIT MOD MDM: CPT

## 2022-09-25 PROCEDURE — 80053 COMPREHEN METABOLIC PANEL: CPT

## 2022-09-25 PROCEDURE — 87077 CULTURE AEROBIC IDENTIFY: CPT

## 2022-09-25 PROCEDURE — 36415 COLL VENOUS BLD VENIPUNCTURE: CPT

## 2022-09-25 PROCEDURE — 87040 BLOOD CULTURE FOR BACTERIA: CPT

## 2022-09-25 PROCEDURE — 96361 HYDRATE IV INFUSION ADD-ON: CPT

## 2022-09-25 PROCEDURE — 85025 COMPLETE CBC W/AUTO DIFF WBC: CPT

## 2022-09-25 PROCEDURE — 74176 CT ABD & PELVIS W/O CONTRAST: CPT

## 2022-09-25 PROCEDURE — 74011250636 HC RX REV CODE- 250/636: Performed by: STUDENT IN AN ORGANIZED HEALTH CARE EDUCATION/TRAINING PROGRAM

## 2022-09-25 PROCEDURE — 81001 URINALYSIS AUTO W/SCOPE: CPT

## 2022-09-25 PROCEDURE — 83605 ASSAY OF LACTIC ACID: CPT

## 2022-09-25 PROCEDURE — 81003 URINALYSIS AUTO W/O SCOPE: CPT

## 2022-09-25 PROCEDURE — 81025 URINE PREGNANCY TEST: CPT

## 2022-09-25 PROCEDURE — 93005 ELECTROCARDIOGRAM TRACING: CPT

## 2022-09-25 PROCEDURE — 87150 DNA/RNA AMPLIFIED PROBE: CPT

## 2022-09-25 PROCEDURE — 87086 URINE CULTURE/COLONY COUNT: CPT

## 2022-09-25 PROCEDURE — 87186 SC STD MICRODIL/AGAR DIL: CPT

## 2022-09-25 PROCEDURE — 96374 THER/PROPH/DIAG INJ IV PUSH: CPT

## 2022-09-25 RX ORDER — SODIUM CHLORIDE 0.9 % (FLUSH) 0.9 %
5-10 SYRINGE (ML) INJECTION AS NEEDED
Status: DISCONTINUED | OUTPATIENT
Start: 2022-09-25 | End: 2022-09-26 | Stop reason: HOSPADM

## 2022-09-25 RX ADMIN — SODIUM CHLORIDE 1000 ML: 9 INJECTION, SOLUTION INTRAVENOUS at 22:08

## 2022-09-26 VITALS
BODY MASS INDEX: 19.91 KG/M2 | HEIGHT: 63 IN | RESPIRATION RATE: 16 BRPM | OXYGEN SATURATION: 100 % | TEMPERATURE: 97.6 F | WEIGHT: 112.4 LBS | DIASTOLIC BLOOD PRESSURE: 60 MMHG | SYSTOLIC BLOOD PRESSURE: 96 MMHG | HEART RATE: 78 BPM

## 2022-09-26 LAB
ACC. NO. FROM MICRO ORDER, ACCP: ABNORMAL
ACINETOBACTER CALCOACETICUS-BAUMANII COMPLEX, ACBCX: NOT DETECTED
APPEARANCE UR: ABNORMAL
ATRIAL RATE: 82 BPM
BACTERIA URNS QL MICRO: ABNORMAL /HPF
BACTEROIDES FRAGILIS, BFRA: NOT DETECTED
BILIRUB UR QL: NEGATIVE
BIOFIRE COMMENT, BCIDPF: ABNORMAL
C GLABRATA DNA VAG QL NAA+PROBE: NOT DETECTED
CALCULATED P AXIS, ECG09: 24 DEGREES
CALCULATED R AXIS, ECG10: -30 DEGREES
CALCULATED T AXIS, ECG11: 34 DEGREES
CANDIDA ALBICANS: NOT DETECTED
CANDIDA AURIS, CAAU: NOT DETECTED
CANDIDA KRUSEI, CKRP: NOT DETECTED
CANDIDA PARAPSILOSIS, CPAUP: NOT DETECTED
CANDIDA TROPICALIS, CTROP: NOT DETECTED
COLOR UR: ABNORMAL
CRYPTO NEOFORMANS/GATTII, CRYNEG: NOT DETECTED
CTX-M (ESBL RESISTANT GENE), CTX: NOT DETECTED
DIAGNOSIS, 93000: NORMAL
ENTEROBACTER CLOACAE COMPLEX, ECCP: NOT DETECTED
ENTEROBACTERALES SP. , ENBLS: DETECTED
ENTEROCOCCUS FAECALIS, ENFA: NOT DETECTED
ENTEROCOCCUS FAECIUM, ENFAM: NOT DETECTED
EPITH CASTS URNS QL MICRO: ABNORMAL /LPF
ESCHERICHIA COLI: DETECTED
GLUCOSE UR STRIP.AUTO-MCNC: NEGATIVE MG/DL
HAEMOPHILUS INFLUENZAE, HMI: NOT DETECTED
HGB UR QL STRIP: ABNORMAL
HYALINE CASTS URNS QL MICRO: ABNORMAL /LPF (ref 0–5)
IMP (CARBAPENEMASE RESISTANT GENE), IMPC: NOT DETECTED
KETONES UR QL STRIP.AUTO: NEGATIVE MG/DL
KLEBSIELLA AEROGENES, KLAE: NOT DETECTED
KLEBSIELLA OXYTOCA: NOT DETECTED
KLEBSIELLA PNEUMONIAE GROUP, KPPG: NOT DETECTED
KPC (CARBAPENEM RESISTANCE GENE): NOT DETECTED
LEUKOCYTE ESTERASE UR QL STRIP.AUTO: ABNORMAL
LISTERIA MONOCYTOGENES, LMONP: NOT DETECTED
MCR-1 (COLISTIN RESISTANT GENE), MCR: NOT DETECTED
NDM (CARBAPENEMASE RESISTANT GENE), NDM: NOT DETECTED
NEISSERIA MENINGITIDIS, NMNI: NOT DETECTED
NITRITE UR QL STRIP.AUTO: NEGATIVE
OXA-48-LIKE (CARBAPENEMASE RESISTANT GENE), OXA48: NOT DETECTED
P-R INTERVAL, ECG05: 118 MS
PH UR STRIP: 5.5 [PH] (ref 5–8)
PROT UR STRIP-MCNC: 30 MG/DL
PROTEUS, PRP: NOT DETECTED
PSEUDOMONAS AERUGINOSA: NOT DETECTED
Q-T INTERVAL, ECG07: 356 MS
QRS DURATION, ECG06: 76 MS
QTC CALCULATION (BEZET), ECG08: 415 MS
RBC #/AREA URNS HPF: ABNORMAL /HPF (ref 0–5)
RESISTANT GENE SPACE, REGENE: ABNORMAL
SALMONELLA, SALMO: NOT DETECTED
SERRATIA MARCESCENS: NOT DETECTED
SP GR UR REFRACTOMETRY: 1.01 (ref 1–1.03)
STAPH EPIDERMIDIS, STEP: NOT DETECTED
STAPH LUGDUNENSIS, STALUG: NOT DETECTED
STAPHYLOCOCCUS AUREUS: NOT DETECTED
STAPHYLOCOCCUS, STAPP: NOT DETECTED
STENO MALTOPHILIA, STMA: NOT DETECTED
STREPTOCOCCUS , STPSP: NOT DETECTED
STREPTOCOCCUS AGALACTIAE (GROUP B): NOT DETECTED
STREPTOCOCCUS PNEUMONIAE , SPNP: NOT DETECTED
STREPTOCOCCUS PYOGENES (GROUP A), SPYOP: NOT DETECTED
TROPONIN-HIGH SENSITIVITY: 27 NG/L (ref 0–51)
UROBILINOGEN UR QL STRIP.AUTO: 1 EU/DL (ref 0.2–1)
VENTRICULAR RATE, ECG03: 82 BPM
VIM (CARBAPENEMASE RESISTANT GENE), VIM: NOT DETECTED
WBC URNS QL MICRO: >100 /HPF (ref 0–4)

## 2022-09-26 PROCEDURE — 36415 COLL VENOUS BLD VENIPUNCTURE: CPT

## 2022-09-26 PROCEDURE — 96374 THER/PROPH/DIAG INJ IV PUSH: CPT

## 2022-09-26 PROCEDURE — 74011250636 HC RX REV CODE- 250/636: Performed by: STUDENT IN AN ORGANIZED HEALTH CARE EDUCATION/TRAINING PROGRAM

## 2022-09-26 PROCEDURE — 84484 ASSAY OF TROPONIN QUANT: CPT

## 2022-09-26 PROCEDURE — 74011000250 HC RX REV CODE- 250: Performed by: STUDENT IN AN ORGANIZED HEALTH CARE EDUCATION/TRAINING PROGRAM

## 2022-09-26 RX ORDER — CEPHALEXIN 500 MG/1
500 CAPSULE ORAL 4 TIMES DAILY
Qty: 20 CAPSULE | Refills: 0 | Status: SHIPPED | OUTPATIENT
Start: 2022-09-27 | End: 2022-09-30

## 2022-09-26 RX ADMIN — CEFTRIAXONE 1 G: 1 INJECTION, POWDER, FOR SOLUTION INTRAMUSCULAR; INTRAVENOUS at 00:58

## 2022-09-26 RX ADMIN — SODIUM CHLORIDE 500 ML: 9 INJECTION, SOLUTION INTRAVENOUS at 01:12

## 2022-09-26 NOTE — ED TRIAGE NOTES
Patient to triage with caretaker, [facility]. [facility], not being able to stand well with decreased PO  intake since yesterday. History Bipolar and microcephaly.

## 2022-09-26 NOTE — ED PROVIDER NOTES
41 yo F w/CKD, seizure disorder, bipolar, microcephaly, hypothyroidism presents with caretaker from group home due to poor po intake and worsening generalized weakness x2 days. Patient has been refusing to eat. Fever yesterday, which improved after antipyretics. Patient c/o abdominal pain. [facility]en at [facility] yesterday and brought records. Brought today due to worsening generalized weakness. No sick contacts. Patient minimally conversive at baseline. Taking all medications, no recent med changes. No cough, nasal congestion, rhinorrhea, vomiting, diarrhea. [facility] work up notable for  CBC 12.3  Cr 2.2  CXR NAP  Rapid covid and flu neg       Past Medical History:   Diagnosis Date    Bipolar affective ([facility] [address] 11/8/2010    MR (mental retardation), severe     Seizures ([facility] [address]        Past Surgical History:   Procedure Laterality Date    HX HEENT Left     muscle tightened         No family history on file. Social History     Socioeconomic History    Marital status: SINGLE     Spouse name: Not on file    Number of children: Not on file    Years of education: Not on file    Highest education level: Not on file   Occupational History    Not on file   Tobacco Use    Smoking status: Never    Smokeless tobacco: Never   Substance and Sexual Activity    Alcohol use: No    Drug use: No    Sexual activity: Never   Other Topics Concern    Not on file   Social History Narrative    Not on file     Social Determinants of Health     Financial Resource Strain: Not on file   Food Insecurity: Not on file   Transportation Needs: Not on file   Physical Activity: Not on file   Stress: Not on file   Social Connections: Not on file   Intimate Partner Violence: Not on file   Housing Stability: Not on file         ALLERGIES: Bactrim [sulfamethoprim] and Phenobarbital    Review of Systems   HENT:  Negative for congestion and rhinorrhea. Eyes:  Negative for discharge. Respiratory:  Negative for cough and shortness of breath. Cardiovascular:  Negative for leg swelling. Gastrointestinal:  Positive for abdominal pain. Negative for nausea and vomiting. Skin:  Negative for rash. Vitals:    09/25/22 2130 09/25/22 2140   BP: (!) 102/56    Pulse: (!) 111    Resp: 18    Temp: 97.6 °F (36.4 °C)    SpO2: 97% 97%   Weight: 51 kg (112 lb 6.4 oz)    Height: 5' 3\" (1.6 m)             Physical Exam  Constitutional:       General: She is not in acute distress. Appearance: She is ill-appearing. She is not toxic-appearing or diaphoretic. HENT:      Head: Normocephalic and atraumatic. Mouth/Throat:      Mouth: Mucous membranes are moist.   Eyes:      Extraocular Movements: Extraocular movements intact. Conjunctiva/sclera: Conjunctivae normal.   Cardiovascular:      Rate and Rhythm: Normal rate and regular rhythm. Pulses: Normal pulses. Heart sounds: Normal heart sounds. Pulmonary:      Effort: Pulmonary effort is normal.      Breath sounds: Normal breath sounds. Abdominal:      General: Bowel sounds are normal.      Tenderness: There is abdominal tenderness in the right upper quadrant, left upper quadrant and left lower quadrant. There is no guarding or rebound. Musculoskeletal:         General: No swelling. Cervical back: Normal range of motion and neck supple. Skin:     General: Skin is warm and dry. Capillary Refill: Capillary refill takes less than 2 seconds. Neurological:      Mental Status: She is alert. Motor: No weakness. Psychiatric:         Mood and Affect: Mood normal.         Behavior: Behavior normal.        MDM  Number of Diagnoses or Management Options  Acute cystitis with hematuria  Diagnosis management comments: 41 yo F w/CKD, seizure disorder, microcephaly, bipolar, hypothyroidism presents with poor po intake and generalized weakness. [facility].  [facility] likely 2/2 dehydration rather than severe sepsis given chronic kidney dz (baseline Cr ~1.5 in 2018). With elevated WBC and tachycardia, meets sepsis criteria, 30cc/kg bolus ordered. CBC, CMP, LA, Bcx, UA. CT A/P given pt indicated abdominal pain. Will hold off on starting abx until labs/imaging help indicate source of infection unless pt meets severe sepsis. LA 1.08. CBC notable for WBC 11.2. CMP notable for Cr 1.67. UA 4+ adrián, lg LE. Ucx in process. CT A/P no evidence of hydronephrosis. Trop 31. Order repeat trop. EKG no acute ischemic ST changes. Rpt Trop 27. Will give dose of Ceftriaxone and then discharge to complete course of Keflex for UTI. ED Course as of 09/26/22 0233   Lillian Garrettippo Sep 25, 2022   2239 EKG performed at 2235 shows normal sinus rhythm, 82 bpm, left axis deviation, no ST changes, no T wave changes, no ectopy.  [IO]      ED Course User Index  [IO] Eri Ruiz MD       Procedures

## 2022-09-26 NOTE — ED NOTES
Pt's caregiver Susan Ugalde given discharge instructions. Pt in no acute distress. Pt left with caregiver.

## 2022-09-27 ENCOUNTER — HOSPITAL ENCOUNTER (INPATIENT)
Age: 37
LOS: 3 days | Discharge: NURSING, REHAB OR LONG-TERM CARE | DRG: 690 | End: 2022-09-30
Attending: EMERGENCY MEDICINE | Admitting: HOSPITALIST
Payer: MEDICARE

## 2022-09-27 DIAGNOSIS — A41.9 SEPSIS, DUE TO UNSPECIFIED ORGANISM, UNSPECIFIED WHETHER ACUTE ORGAN DYSFUNCTION PRESENT (HCC): Primary | ICD-10-CM

## 2022-09-27 PROBLEM — B96.20 E COLI BACTEREMIA: Status: ACTIVE | Noted: 2022-09-27

## 2022-09-27 PROBLEM — R78.81 E COLI BACTEREMIA: Status: ACTIVE | Noted: 2022-09-27

## 2022-09-27 LAB
ALBUMIN SERPL-MCNC: 2.1 G/DL (ref 3.5–5)
ALBUMIN/GLOB SERPL: 0.4 {RATIO} (ref 1.1–2.2)
ALP SERPL-CCNC: 54 U/L (ref 45–117)
ALT SERPL-CCNC: 14 U/L (ref 12–78)
ANION GAP SERPL CALC-SCNC: 6 MMOL/L (ref 5–15)
AST SERPL-CCNC: 18 U/L (ref 15–37)
BASOPHILS # BLD: 0.1 K/UL (ref 0–0.1)
BASOPHILS NFR BLD: 1 % (ref 0–1)
BILIRUB SERPL-MCNC: 0.2 MG/DL (ref 0.2–1)
BUN SERPL-MCNC: 30 MG/DL (ref 6–20)
BUN/CREAT SERPL: 21 (ref 12–20)
CALCIUM SERPL-MCNC: 8.8 MG/DL (ref 8.5–10.1)
CHLORIDE SERPL-SCNC: 105 MMOL/L (ref 97–108)
CO2 SERPL-SCNC: 25 MMOL/L (ref 21–32)
COMMENT, HOLDF: NORMAL
CREAT SERPL-MCNC: 1.45 MG/DL (ref 0.55–1.02)
DIFFERENTIAL METHOD BLD: ABNORMAL
EOSINOPHIL # BLD: 0 K/UL (ref 0–0.4)
EOSINOPHIL NFR BLD: 0 % (ref 0–7)
ERYTHROCYTE [DISTWIDTH] IN BLOOD BY AUTOMATED COUNT: 12.7 % (ref 11.5–14.5)
GLOBULIN SER CALC-MCNC: 4.7 G/DL (ref 2–4)
GLUCOSE SERPL-MCNC: 90 MG/DL (ref 65–100)
HCT VFR BLD AUTO: 31.2 % (ref 35–47)
HGB BLD-MCNC: 10.3 G/DL (ref 11.5–16)
IMM GRANULOCYTES # BLD AUTO: 0 K/UL
IMM GRANULOCYTES NFR BLD AUTO: 0 %
LYMPHOCYTES # BLD: 3.2 K/UL (ref 0.8–3.5)
LYMPHOCYTES NFR BLD: 30 % (ref 12–49)
MCH RBC QN AUTO: 29.4 PG (ref 26–34)
MCHC RBC AUTO-ENTMCNC: 33 G/DL (ref 30–36.5)
MCV RBC AUTO: 89.1 FL (ref 80–99)
MONOCYTES # BLD: 1.2 K/UL (ref 0–1)
MONOCYTES NFR BLD: 11 % (ref 5–13)
MYELOCYTES NFR BLD MANUAL: 1 %
NEUTS SEG # BLD: 6.1 K/UL (ref 1.8–8)
NEUTS SEG NFR BLD: 57 % (ref 32–75)
NRBC # BLD: 0 K/UL (ref 0–0.01)
NRBC BLD-RTO: 0 PER 100 WBC
PLATELET # BLD AUTO: 168 K/UL (ref 150–400)
PMV BLD AUTO: 10.4 FL (ref 8.9–12.9)
POTASSIUM SERPL-SCNC: 3.3 MMOL/L (ref 3.5–5.1)
PROT SERPL-MCNC: 6.8 G/DL (ref 6.4–8.2)
RBC # BLD AUTO: 3.5 M/UL (ref 3.8–5.2)
RBC MORPH BLD: ABNORMAL
SAMPLES BEING HELD,HOLD: NORMAL
SODIUM SERPL-SCNC: 136 MMOL/L (ref 136–145)
TROPONIN-HIGH SENSITIVITY: 31 NG/L (ref 0–51)
WBC # BLD AUTO: 10.7 K/UL (ref 3.6–11)

## 2022-09-27 PROCEDURE — 87040 BLOOD CULTURE FOR BACTERIA: CPT

## 2022-09-27 PROCEDURE — 74011000258 HC RX REV CODE- 258: Performed by: HOSPITALIST

## 2022-09-27 PROCEDURE — 74011000258 HC RX REV CODE- 258: Performed by: EMERGENCY MEDICINE

## 2022-09-27 PROCEDURE — 74011250636 HC RX REV CODE- 250/636: Performed by: EMERGENCY MEDICINE

## 2022-09-27 PROCEDURE — 65270000029 HC RM PRIVATE

## 2022-09-27 PROCEDURE — 80053 COMPREHEN METABOLIC PANEL: CPT

## 2022-09-27 PROCEDURE — 74011250636 HC RX REV CODE- 250/636: Performed by: HOSPITALIST

## 2022-09-27 PROCEDURE — 99285 EMERGENCY DEPT VISIT HI MDM: CPT

## 2022-09-27 PROCEDURE — 74011250637 HC RX REV CODE- 250/637: Performed by: HOSPITALIST

## 2022-09-27 PROCEDURE — 85025 COMPLETE CBC W/AUTO DIFF WBC: CPT

## 2022-09-27 PROCEDURE — 36415 COLL VENOUS BLD VENIPUNCTURE: CPT

## 2022-09-27 PROCEDURE — 84484 ASSAY OF TROPONIN QUANT: CPT

## 2022-09-27 PROCEDURE — 74011000250 HC RX REV CODE- 250: Performed by: HOSPITALIST

## 2022-09-27 RX ORDER — RISPERIDONE 1 MG/1
2 TABLET, FILM COATED ORAL DAILY
Status: DISCONTINUED | OUTPATIENT
Start: 2022-09-28 | End: 2022-09-30 | Stop reason: HOSPADM

## 2022-09-27 RX ORDER — SODIUM CHLORIDE 9 MG/ML
100 INJECTION, SOLUTION INTRAVENOUS CONTINUOUS
Status: DISCONTINUED | OUTPATIENT
Start: 2022-09-27 | End: 2022-09-30 | Stop reason: HOSPADM

## 2022-09-27 RX ORDER — ZOLPIDEM TARTRATE 5 MG/1
5 TABLET ORAL
Status: DISCONTINUED | OUTPATIENT
Start: 2022-09-27 | End: 2022-09-28

## 2022-09-27 RX ORDER — SODIUM CHLORIDE 0.9 % (FLUSH) 0.9 %
5-40 SYRINGE (ML) INJECTION AS NEEDED
Status: DISCONTINUED | OUTPATIENT
Start: 2022-09-27 | End: 2022-09-30 | Stop reason: HOSPADM

## 2022-09-27 RX ORDER — ONDANSETRON 4 MG/1
4 TABLET, ORALLY DISINTEGRATING ORAL
Status: DISCONTINUED | OUTPATIENT
Start: 2022-09-27 | End: 2022-09-30 | Stop reason: HOSPADM

## 2022-09-27 RX ORDER — ACETAMINOPHEN 650 MG/1
650 SUPPOSITORY RECTAL
Status: DISCONTINUED | OUTPATIENT
Start: 2022-09-27 | End: 2022-09-30 | Stop reason: HOSPADM

## 2022-09-27 RX ORDER — POLYETHYLENE GLYCOL 3350 17 G/17G
17 POWDER, FOR SOLUTION ORAL DAILY PRN
Status: DISCONTINUED | OUTPATIENT
Start: 2022-09-27 | End: 2022-09-28

## 2022-09-27 RX ORDER — SODIUM CHLORIDE 0.9 % (FLUSH) 0.9 %
5-10 SYRINGE (ML) INJECTION AS NEEDED
Status: DISCONTINUED | OUTPATIENT
Start: 2022-09-27 | End: 2022-09-30 | Stop reason: HOSPADM

## 2022-09-27 RX ORDER — ENOXAPARIN SODIUM 100 MG/ML
30 INJECTION SUBCUTANEOUS DAILY
Status: DISCONTINUED | OUTPATIENT
Start: 2022-09-28 | End: 2022-09-30 | Stop reason: HOSPADM

## 2022-09-27 RX ORDER — BENZTROPINE MESYLATE 1 MG/1
1 TABLET ORAL 2 TIMES DAILY
Status: DISCONTINUED | OUTPATIENT
Start: 2022-09-27 | End: 2022-09-30 | Stop reason: HOSPADM

## 2022-09-27 RX ORDER — TRAZODONE HYDROCHLORIDE 100 MG/1
100 TABLET ORAL
Status: DISCONTINUED | OUTPATIENT
Start: 2022-09-27 | End: 2022-09-30 | Stop reason: HOSPADM

## 2022-09-27 RX ORDER — RISPERIDONE 1 MG/1
3 TABLET, FILM COATED ORAL
Status: DISCONTINUED | OUTPATIENT
Start: 2022-09-27 | End: 2022-09-30 | Stop reason: HOSPADM

## 2022-09-27 RX ORDER — SODIUM CHLORIDE 0.9 % (FLUSH) 0.9 %
5-40 SYRINGE (ML) INJECTION EVERY 8 HOURS
Status: DISCONTINUED | OUTPATIENT
Start: 2022-09-27 | End: 2022-09-30 | Stop reason: HOSPADM

## 2022-09-27 RX ORDER — DIVALPROEX SODIUM 500 MG/1
500 TABLET, EXTENDED RELEASE ORAL 3 TIMES DAILY
Status: DISCONTINUED | OUTPATIENT
Start: 2022-09-27 | End: 2022-09-28

## 2022-09-27 RX ORDER — TOPIRAMATE 100 MG/1
200 TABLET, FILM COATED ORAL
Status: DISCONTINUED | OUTPATIENT
Start: 2022-09-27 | End: 2022-09-30 | Stop reason: HOSPADM

## 2022-09-27 RX ORDER — LEVOTHYROXINE SODIUM 50 UG/1
50 TABLET ORAL
Status: DISCONTINUED | OUTPATIENT
Start: 2022-09-28 | End: 2022-09-28

## 2022-09-27 RX ORDER — ACETAMINOPHEN 325 MG/1
650 TABLET ORAL
Status: DISCONTINUED | OUTPATIENT
Start: 2022-09-27 | End: 2022-09-30 | Stop reason: HOSPADM

## 2022-09-27 RX ORDER — ONDANSETRON 2 MG/ML
4 INJECTION INTRAMUSCULAR; INTRAVENOUS
Status: DISCONTINUED | OUTPATIENT
Start: 2022-09-27 | End: 2022-09-30 | Stop reason: HOSPADM

## 2022-09-27 RX ADMIN — SODIUM CHLORIDE, PRESERVATIVE FREE 10 ML: 5 INJECTION INTRAVENOUS at 23:06

## 2022-09-27 RX ADMIN — TOPIRAMATE 200 MG: 100 TABLET, FILM COATED ORAL at 23:05

## 2022-09-27 RX ADMIN — PIPERACILLIN AND TAZOBACTAM 4.5 G: 4; .5 INJECTION, POWDER, FOR SOLUTION INTRAVENOUS at 17:45

## 2022-09-27 RX ADMIN — SODIUM CHLORIDE 100 ML/HR: 9 INJECTION, SOLUTION INTRAVENOUS at 23:11

## 2022-09-27 RX ADMIN — PIPERACILLIN AND TAZOBACTAM 3.38 G: 3; .375 INJECTION, POWDER, FOR SOLUTION INTRAVENOUS at 23:05

## 2022-09-27 RX ADMIN — SODIUM CHLORIDE 1000 ML: 9 INJECTION, SOLUTION INTRAVENOUS at 17:03

## 2022-09-27 RX ADMIN — RISPERIDONE 3 MG: 1 TABLET ORAL at 23:05

## 2022-09-27 RX ADMIN — BENZTROPINE MESYLATE 1 MG: 1 TABLET ORAL at 23:05

## 2022-09-27 NOTE — ED NOTES
TRANSFER - OUT REPORT:    Verbal report given to Lesa ESTEBAN(name) on [facility]  being transferred to [facility](unit) for routine progression of care       Report consisted of patients Situation, Background, Assessment and   Recommendations(SBAR). Information from the following report(s) SBAR, Kardex, ED Summary, Intake/Output, and MAR was reviewed with the receiving nurse. Lines:   Peripheral IV 09/27/22 Left Antecubital (Active)        Opportunity for questions and clarification was provided.       Patient transported with:   [employer]

## 2022-09-27 NOTE — ED PROVIDER NOTES
Patient presents with concern for sepsis. Patient seen recently and found to have urinary tract infection. Blood cultures returned positive showing 2 bottles with gram-negative rods. Patient has been altered for staff at her group home. Additional history and review of systems is limited by mental retardation. Past Medical History:   Diagnosis Date    Bipolar affective ([facility] [address] 11/8/2010    MR (mental retardation), severe     Seizures ([facility] [address]        Past Surgical History:   Procedure Laterality Date    HX HEENT Left     muscle tightened         No family history on file. Social History     Socioeconomic History    Marital status: SINGLE     Spouse name: Not on file    Number of children: Not on file    Years of education: Not on file    Highest education level: Not on file   Occupational History    Not on file   Tobacco Use    Smoking status: Never    Smokeless tobacco: Never   Substance and Sexual Activity    Alcohol use: No    Drug use: No    Sexual activity: Never   Other Topics Concern    Not on file   Social History Narrative    Not on file     Social Determinants of Health     Financial Resource Strain: Not on file   Food Insecurity: Not on file   Transportation Needs: Not on file   Physical Activity: Not on file   Stress: Not on file   Social Connections: Not on file   Intimate Partner Violence: Not on file   Housing Stability: Not on file         ALLERGIES: Bactrim [sulfamethoprim] and Phenobarbital    Review of Systems   Unable to perform ROS: Other   Severe mental retardation    Vitals:    09/27/22 1542   BP: 91/63   Pulse: 71   Resp: 18   Temp: 97.3 °F (36.3 °C)   SpO2: 98%   Weight: 50.8 kg (112 lb)   Height: 5' 4\" (1.626 m)            Physical Exam  Vitals and nursing note reviewed. Constitutional:       General: She is not in acute distress. Appearance: Normal appearance. She is not ill-appearing, toxic-appearing or diaphoretic. HENT:      Head: Normocephalic and atraumatic.    Eyes: Extraocular Movements: Extraocular movements intact. Cardiovascular:      Rate and Rhythm: Normal rate. Pulses: Normal pulses. Pulmonary:      Effort: Pulmonary effort is normal. No respiratory distress. Abdominal:      General: There is no distension. Musculoskeletal:         General: Normal range of motion. Cervical back: Normal range of motion. Skin:     General: Skin is dry. Neurological:      Mental Status: She is alert. Mental status is at baseline. MDM  Number of Diagnoses or Management Options  Sepsis, due to unspecified organism, unspecified whether acute organ dysfunction present [facility])  Diagnosis management comments:   Cultures reviewed. Gram-negative rods growing in 2 bottles. Will cover with Zosyn with likely urinary source. Will admit to hospital medicine. Perfect Serve Consult for Admission  4:04 PM    ED Room Number: V04/V04  Patient Name and age:  Landen Vaughn 40 y.o.  female  Working Diagnosis: Sepsis, due to unspecified organism, unspecified whether acute organ dysfunction present ([facility] [address]  (primary encounter diagnosis)    COVID-19 Suspicion:  no  Sepsis present:  yes  Reassessment needed: no  Code Status:  Full Code  Readmission: no  Isolation Requirements:  no  Recommended Level of Care:  telemetry  Department:[facility] ED - [phone]  Other:  blood cultures from yesterday growing gram neg rods 2 bottles. Likely urinary source. Code Sepsis Reassessment & Plan    - Sepsis order set entered: YES  - Broad Spectrum Antibiotics given: See orders  - Repeat lactic acid ordered for time see orders  - Re-assessment performed at time 430pm and clinical condition stable. - Actions taken: See orders.   - Hypotension or Lactic Acidosis present (SBP<90, MAP<65, Lactate >4): Hypotension resolved after fluid bolus  - IVF: See orders  - Persistent Septic Shock present (Hypotension despite IVF resuscitation): NO  Vasopressors: Not indicated due to septic shock not present  - Disposition: Admit to Telemetry                     Procedures

## 2022-09-27 NOTE — PROGRESS NOTES
[facility] Dosing Services: Enoxaparin dose adjustment    Consult made for this 40 y.o. female, for prophylaxis of  DVT. Wt Readings from Last 1 Encounters:   09/27/22 50.8 kg (112 lb)       Ht Readings from Last 1 Encounters:   09/27/22 162.6 cm (64\")           Previous Dose    Creatinine Clearance Estimated Creatinine Clearance: 43 mL/min (A) (based on SCr of 1.45 mg/dL (H)). Creatinine Lab Results   Component Value Date/Time    Creatinine 1.45 (H) 09/27/2022 04:20 PM       Platelet Lab Results   Component Value Date/Time    PLATELET 195 63/86/4441 04:20 PM      H/H Lab Results   Component Value Date/Time    HGB 10.3 (L) 09/27/2022 04:20 PM         Epidural Catheter? No    Other anticoagulants: No  Relevant drug interactions: No    Pharmacist made change to enoxaparin therapy based on:  [ X ] Weight: 50.8 kg dose changed to: Enoxaparin 30 mg SC Q 24 hours per protocol for Weight (kg) 50 and below    Pharmacy to automatically make dose adjustment for renal dysfunction (creatinine clearance less than 30 mL/min)  Pharmacy to automatically make dose adjustment for obesity (BMI greater than 40)  Pharmacy to make dose rounding adjustments per [facility] dose adjustment scale. Pharmacy to monitor patients progress. Will make dose adjustment as needed per changing renal function. Will communicate further recommendations regarding patients anticoagulation therapy with prescriber.     Signed Roxy Doyle, [facility] information: [phone]

## 2022-09-27 NOTE — H&P
History & Physical    Primary Care Provider: Other, MD Antony  Source of Information: Patient   CC:     History of Presenting Illness:   Christine Soni is a 40 y.o. female with MR lives in a group home. She presents with positive blood cultures growing probable E coli 2/4 bottles. Nisreen gonsalez GNR. She was previously here on 9/25 and seen by family resident due to reduced PO intake and generalized body weakness. She was found to have UTI and was bolused with IVF and given Ceftriaxone and discharged home    The patient is otherwise a poor historian       Review of Systems:  Review of systems not obtained due to patient factors. Past Medical History:   Diagnosis Date    Bipolar affective ([facility] [address] 11/8/2010    MR (mental retardation), severe     Seizures ([facility] [address]       Past Surgical History:   Procedure Laterality Date    HX HEENT Left     muscle tightened     Prior to Admission medications    Medication Sig Start Date End Date Taking? Authorizing Provider   cephALEXin (Keflex) 500 mg capsule Take 1 Capsule by mouth four (4) times daily for 5 days.  9/27/22 10/2/22  Armani Granda MD   drospirenone-ethinyl estradioL (ANAYA) 3-0.02 mg tab TAKE ONE TABLET DAILY 10/18/21   Dioni Lyon NP   docusate sodium (COLACE) 100 mg capsule TAKE (1) CAPSULE BY MOUTH TWICE DAILY 10/12/21   Mirta Phipps MD   Cranberry Juice Powder 425 mg cap TAKE 1 CAPSULE BY MOUTH DAILY 5/12/21   Mirta Phipps MD   docusate sodium (COLACE) 100 mg capsule TAKE (1) CAPSULE BY MOUTH TWICE DAILY 10/16/20   Mirta Phipps MD   chlorhexidine (PERIDEX) 0.12 % solution SWISH AND SPIT 15 ML IN MOUTH TWICE DAILY 10/12/20   Dioni Lyon NP   acetaminophen (TYLENOL) 500 mg tablet TAKE 2 TABLETS BY MOUTH EVERY 6 HOURS AS NEEDED FOR PAIN 8/19/20   Mirta Phipps MD   levothyroxine (SYNTHROID) 50 mcg tablet TAKE 1 TABLET BY MOUTH BEFORE BREAKFAST 5/15/20   Mirta Phipps MD   cetirizine (ZYRTEC) 10 mg tablet TAKE 1 TABLET BY MOUTH DAILY 10/20/19   Razia Flor MD   polyethylene glycol (MIRALAX) 17 gram/dose powder MIX 1 CAPFUL (SEE 17 gm LINE) IN A GLASS OF WATER & DRINK ONCE DAILY FOR CONSTIPATION. 6/25/19   Razia Flor MD   docusate sodium (COLACE) 100 mg capsule TAKE (1) CAPSULE BY MOUTH TWICE DAILY 10/12/18   Cain Cordova NP   ketoconazole (NIZORAL) 2 % topical cream Apply  to affected area two (2) times a day. X 2 weeks, d/c lotrisone 9/11/18   Terra Main DO   cranberry extract 500 mg cap capsule Take 1 Cap by mouth daily. 5/23/18   Cain Cordova NP   risperiDONE (RISPERDAL) 3 mg tablet Take 3 mg by mouth nightly. Provider, Historical   hydrOXYzine HCl (ATARAX) 25 mg tablet Take 25 mg by mouth two (2) times daily as needed for Anxiety (agitation). Provider, Historical   divalproex ER (DEPAKOTE ER) 500 mg ER tablet Take 500 mg by mouth three (3) times daily. Provider, Historical   risperiDONE (RISPERDAL) 2 mg tablet Take 2 mg by mouth daily. Provider, Historical   topiramate (TOPAMAX) 200 mg tablet Take 200 mg by mouth nightly. Provider, Historical   fluticasone (FLONASE) 50 mcg/actuation nasal spray 2 Sprays by Both Nostrils route daily. Provider, Historical   zolpidem (AMBIEN) 5 mg tablet Take 5 mg by mouth nightly as needed for Sleep. Provider, Historical   traZODone (DESYREL) 100 mg tablet Take 200 mg by mouth nightly. Provider, Historical   benztropine (COGENTIN) 1 mg tablet Take 1 Tab by mouth two (2) times a day. 3/27/15   Cain Cordova NP     Allergies   Allergen Reactions    Bactrim [Sulfamethoprim] Other (comments)     Dehydration    Phenobarbital Unknown (comments)     Unable to obtain      No family history on file.      SOCIAL HISTORY:  Patient resides: group home  Independently    Assisted Living    SNF    With family care       Smoking history:   None    Former    Chronic      Alcohol history:   None    Social    Chronic      Ambulates:   Independently    w/cane w/walker    w/wc    CODE STATUS:  DNR    Full    Other      Objective:     Physical Exam:     Visit Vitals  /76   Pulse 78   Temp 97.3 °F (36.3 °C)   Resp 21   Ht 5' 4\" (1.626 m)   Wt 50.8 kg (112 lb)   SpO2 95%   BMI 19.22 kg/m²           General:  Alert, cooperative, no distress, appears stated age. Head:  Normocephalic, without obvious abnormality, atraumatic. Eyes:  Conjunctivae/corneas clear. PERRL, EOMs intact. Nose: Nares normal.   Throat: Lips, mucosa, and tongue normal. Teeth and gums normal.   Neck: Supple, symmetrical, trachea midline, no adenopathy, thyroid: no enlargement/tenderness/nodules, no carotid bruit and no JVD. Back:   Symmetric, no curvature. ROM normal. No CVA tenderness. Lungs:   Clear to auscultation bilaterally. Chest wall:  No tenderness or deformity. Heart:  Regular rate and rhythm, S1, S2 normal, no murmur, click, rub or gallop. Abdomen:   Soft, non-tender. Bowel sounds normal. No masses,  No organomegaly. Extremities: Extremities normal, atraumatic, no cyanosis or edema. Pulses: 2+ and symmetric all extremities. Skin: Skin color, texture, turgor normal. No rashes or lesions   Neurologic: Awake. Orientation not assessed         Data Review:     Recent Days:  Recent Labs     09/27/22  1620 09/25/22  2210   WBC 10.7 11.2*   HGB 10.3* 11.8   HCT 31.2* 35.2    156     Recent Labs     09/27/22  1620 09/25/22  2210    137   K 3.3* 3.3*    105   CO2 25 25   GLU 90 106*   BUN 30* 35*   CREA 1.45* 1.67*   CA 8.8 9.0   ALB 2.1* 2.2*   ALT 14 14     No results for input(s): PH, PCO2, PO2, HCO3, FIO2 in the last 72 hours.     24 Hour Results:  Recent Results (from the past 24 hour(s))   CBC WITH AUTOMATED DIFF    Collection Time: 09/27/22  4:20 PM   Result Value Ref Range    WBC 10.7 3.6 - 11.0 K/uL    RBC 3.50 (L) 3.80 - 5.20 M/uL    HGB 10.3 (L) 11.5 - 16.0 g/dL    HCT 31.2 (L) 35.0 - 47.0 %    MCV 89.1 80.0 - 99.0 FL    MCH 29.4 26.0 - 34.0 PG MCHC 33.0 30.0 - 36.5 g/dL    RDW 12.7 11.5 - 14.5 %    PLATELET 884 573 - 442 K/uL    MPV 10.4 8.9 - 12.9 FL    NRBC 0.0 0  WBC    ABSOLUTE NRBC 0.00 0.00 - 0.01 K/uL    NEUTROPHILS 57 32 - 75 %    LYMPHOCYTES 30 12 - 49 %    MONOCYTES 11 5 - 13 %    EOSINOPHILS 0 0 - 7 %    BASOPHILS 1 0 - 1 %    MYELOCYTES 1 (H) 0 %    IMMATURE GRANULOCYTES 0 %    ABS. NEUTROPHILS 6.1 1.8 - 8.0 K/UL    ABS. LYMPHOCYTES 3.2 0.8 - 3.5 K/UL    ABS. MONOCYTES 1.2 (H) 0.0 - 1.0 K/UL    ABS. EOSINOPHILS 0.0 0.0 - 0.4 K/UL    ABS. BASOPHILS 0.1 0.0 - 0.1 K/UL    ABS. IMM. GRANS. 0.0 K/UL    DF MANUAL      RBC COMMENTS NORMOCYTIC, NORMOCHROMIC     METABOLIC PANEL, COMPREHENSIVE    Collection Time: 09/27/22  4:20 PM   Result Value Ref Range    Sodium 136 136 - 145 mmol/L    Potassium 3.3 (L) 3.5 - 5.1 mmol/L    Chloride 105 97 - 108 mmol/L    CO2 25 21 - 32 mmol/L    Anion gap 6 5 - 15 mmol/L    Glucose 90 65 - 100 mg/dL    BUN 30 (H) 6 - 20 MG/DL    Creatinine 1.45 (H) 0.55 - 1.02 MG/DL    BUN/Creatinine ratio 21 (H) 12 - 20      GFR est AA 50 (L) >60 ml/min/1.73m2    GFR est non-AA 41 (L) >60 ml/min/1.73m2    Calcium 8.8 8.5 - 10.1 MG/DL    Bilirubin, total 0.2 0.2 - 1.0 MG/DL    ALT (SGPT) 14 12 - 78 U/L    AST (SGOT) 18 15 - 37 U/L    Alk. phosphatase 54 45 - 117 U/L    Protein, total 6.8 6.4 - 8.2 g/dL    Albumin 2.1 (L) 3.5 - 5.0 g/dL    Globulin 4.7 (H) 2.0 - 4.0 g/dL    A-G Ratio 0.4 (L) 1.1 - 2.2     TROPONIN-HIGH SENSITIVITY    Collection Time: 09/27/22  4:20 PM   Result Value Ref Range    Troponin-High Sensitivity 31 0 - 51 ng/L   SAMPLES BEING HELD    Collection Time: 09/27/22  4:31 PM   Result Value Ref Range    SAMPLES BEING HELD RD GOLD GRE[employer]     COMMENT        Add-on orders for these samples will be processed based on acceptable specimen integrity and analyte stability, which may vary by analyte.          Imaging:     Assessment:     Principal Problem:    E coli bacteremia (9/27/2022)           Plan:     1. E coli bacteremia: Likely 2/2 to UTI  No evidence of sepsis  S/p Zosyn in the ER. C/w same abx for broad spectrum coverage  Repeat blood cultures    2. UTI: Continue zosyn. F/up on urine cx and speciation    3. Mental retardation, Bipolar disorder  _ Resume home meds    4. DVT PPX: Lovenox  Dispo:  Admit to medical floor         Signed By: Joceline Weiss MD     September 27, 2022

## 2022-09-27 NOTE — ED TRIAGE NOTES
Patient to ER for \" bacteria in her blood\". Patient was seen on Sunday for decreased PO intake, called today for abnormal lab results. Patient is from group home, history of microcephaly. Per caregiver she has a history of being combative, decreased following with minimal verbal responses. Normally figits at baseline and has been more calm and sleeping per caregiver.

## 2022-09-28 LAB
BACTERIA SPEC CULT: ABNORMAL
CC UR VC: ABNORMAL
SERVICE CMNT-IMP: ABNORMAL

## 2022-09-28 PROCEDURE — 65270000029 HC RM PRIVATE

## 2022-09-28 PROCEDURE — 74011250636 HC RX REV CODE- 250/636: Performed by: HOSPITALIST

## 2022-09-28 PROCEDURE — 74011250637 HC RX REV CODE- 250/637: Performed by: HOSPITALIST

## 2022-09-28 PROCEDURE — 74011000258 HC RX REV CODE- 258: Performed by: HOSPITALIST

## 2022-09-28 PROCEDURE — 74011000250 HC RX REV CODE- 250: Performed by: HOSPITALIST

## 2022-09-28 RX ORDER — HYDROXYZINE 25 MG/1
25 TABLET, FILM COATED ORAL 2 TIMES DAILY
Status: DISCONTINUED | OUTPATIENT
Start: 2022-09-28 | End: 2022-09-30 | Stop reason: HOSPADM

## 2022-09-28 RX ORDER — HYDROXYZINE 25 MG/1
25 TABLET, FILM COATED ORAL
Status: DISCONTINUED | OUTPATIENT
Start: 2022-09-28 | End: 2022-09-30 | Stop reason: HOSPADM

## 2022-09-28 RX ORDER — CHOLECALCIFEROL (VITAMIN D3) 125 MCG
2000 CAPSULE ORAL DAILY
COMMUNITY

## 2022-09-28 RX ORDER — DIVALPROEX SODIUM 125 MG/1
500 CAPSULE, COATED PELLETS ORAL 3 TIMES DAILY
Status: DISCONTINUED | OUTPATIENT
Start: 2022-09-28 | End: 2022-09-30 | Stop reason: HOSPADM

## 2022-09-28 RX ORDER — POLYETHYLENE GLYCOL 3350 17 G/17G
17 POWDER, FOR SOLUTION ORAL DAILY
Status: DISCONTINUED | OUTPATIENT
Start: 2022-09-28 | End: 2022-09-30 | Stop reason: HOSPADM

## 2022-09-28 RX ORDER — DIVALPROEX SODIUM 125 MG/1
500 CAPSULE, COATED PELLETS ORAL 3 TIMES DAILY
COMMUNITY

## 2022-09-28 RX ORDER — CLONIDINE HYDROCHLORIDE 0.1 MG/1
0.1 TABLET ORAL
COMMUNITY

## 2022-09-28 RX ORDER — RISPERIDONE 3 MG/1
3 TABLET, FILM COATED ORAL
COMMUNITY

## 2022-09-28 RX ORDER — LEVOTHYROXINE SODIUM 88 UG/1
88 TABLET ORAL
COMMUNITY

## 2022-09-28 RX ORDER — HYDROXYZINE 25 MG/1
25 TABLET, FILM COATED ORAL
COMMUNITY

## 2022-09-28 RX ORDER — LEVOTHYROXINE SODIUM 88 UG/1
88 TABLET ORAL
Status: DISCONTINUED | OUTPATIENT
Start: 2022-09-28 | End: 2022-09-30 | Stop reason: HOSPADM

## 2022-09-28 RX ORDER — HYDROXYZINE 25 MG/1
25 TABLET, FILM COATED ORAL
Status: DISCONTINUED | OUTPATIENT
Start: 2022-09-28 | End: 2022-09-28

## 2022-09-28 RX ADMIN — TOPIRAMATE 200 MG: 100 TABLET, FILM COATED ORAL at 21:20

## 2022-09-28 RX ADMIN — HYDROXYZINE HYDROCHLORIDE 25 MG: 25 TABLET, FILM COATED ORAL at 19:44

## 2022-09-28 RX ADMIN — RISPERIDONE 3 MG: 1 TABLET ORAL at 21:20

## 2022-09-28 RX ADMIN — BENZTROPINE MESYLATE 1 MG: 1 TABLET ORAL at 10:42

## 2022-09-28 RX ADMIN — CEFTRIAXONE 2 G: 2 INJECTION, POWDER, FOR SOLUTION INTRAMUSCULAR; INTRAVENOUS at 16:36

## 2022-09-28 RX ADMIN — BENZTROPINE MESYLATE 1 MG: 1 TABLET ORAL at 19:46

## 2022-09-28 RX ADMIN — SODIUM CHLORIDE, PRESERVATIVE FREE 10 ML: 5 INJECTION INTRAVENOUS at 21:24

## 2022-09-28 RX ADMIN — DIVALPROEX SODIUM 500 MG: 125 CAPSULE, COATED PELLETS ORAL at 16:00

## 2022-09-28 RX ADMIN — TRAZODONE HYDROCHLORIDE 100 MG: 100 TABLET ORAL at 21:22

## 2022-09-28 RX ADMIN — SODIUM CHLORIDE, PRESERVATIVE FREE 10 ML: 5 INJECTION INTRAVENOUS at 05:19

## 2022-09-28 RX ADMIN — DIVALPROEX SODIUM 500 MG: 125 CAPSULE, COATED PELLETS ORAL at 21:21

## 2022-09-28 RX ADMIN — DIVALPROEX SODIUM 500 MG: 500 TABLET, EXTENDED RELEASE ORAL at 09:00

## 2022-09-28 RX ADMIN — LEVOTHYROXINE SODIUM 88 MCG: 0.09 TABLET ORAL at 11:10

## 2022-09-28 RX ADMIN — PIPERACILLIN AND TAZOBACTAM 3.38 G: 3; .375 INJECTION, POWDER, FOR SOLUTION INTRAVENOUS at 06:38

## 2022-09-28 RX ADMIN — HYDROXYZINE HYDROCHLORIDE 25 MG: 25 TABLET, FILM COATED ORAL at 14:30

## 2022-09-28 RX ADMIN — DIVALPROEX SODIUM 500 MG: 125 CAPSULE, COATED PELLETS ORAL at 11:06

## 2022-09-28 NOTE — PROGRESS NOTES
8:10am:  RN called caregiver  Liseth Oliver   [phone] as listed in pt summary report. Requested name of assisted living facility and contact information for pharmacy to effort updated med list.      Pt resides at [facility]. Caregiver stated staff not on sight until 1530pm today. RN discussed importance for updated med req list. Caregiver states she will effort updated pharmacy med list at this time and will fax to unit floor.  RN provided name/unit phone to call if any questions or problems efforting list.     815am: Pharmacy notified of above    [employer] RN

## 2022-09-28 NOTE — PROGRESS NOTES
Physician Progress Note      PATIENTMorridalia KAPADIA #:                  [id number]  :                       1985  ADMIT DATE:       2022 3:46 PM  [address] DATE:  RESPONDING  PROVIDER #:        Javier Montoya MD          QUERY TEXT:    Good morning. Pt admitted with sepsis and UTI. Pt noted to have elevated creatinine. If possible, please document in the progress notes and discharge summary if you are evaluating and/or treating any of the following: The medical record reflects the following:  Risk Factors: sepsis, UTI, Ecoli bacteremia,  Clinical Indicators: Crea-1.45, no documented UOP since admission  Treatment: IVF bolus, daily labs    Thank you  Shanique Howard RN CDI  [phone]    Defined by Kidney Disease Improving Global Outcomes (KDIGO) clinical practice guideline for acute kidney injury:  -Increase in SCr by greater than or equal to 0.3 mg/dl within 48?hours; or  -Increase or decrease in SCr to greater than or equal to 1.5 times baseline, which is known or presumed to have occurred within the prior 7 days; or  -Urine volume < 0.5ml/kg/h for 6 hours  Options provided:  -- Acute kidney failure  -- Acute kidney failure not present  -- Other - I will add my own diagnosis  -- Disagree - Not applicable / Not valid  -- Disagree - Clinically unable to determine / Unknown  -- Refer to Clinical Documentation Reviewer    PROVIDER RESPONSE TEXT:    This patient is in Acute kidney failure.     Query created by: Deirdre Kong on 2022 11:19 AM      Electronically signed by:  Javier Montoya MD 2022 2:06 PM

## 2022-09-28 NOTE — PROGRESS NOTES
I called and left a message at patient's group home for call back concerning blood culture results  I also contacted and spoke with patient's sister who is emergency contact. We discussed presence of bacteria growing in blood and need for patient to return to ER for reevaluation, repeat labs, iv antibiotics and admission for iv antibiotics. She acknowledges understanding.
I called and left message again at patients group home for the need of a return phone call concerning patients blood culture results
I spoke with supervisor at group home concerning blood culture results. Informed her patient needs to return to ER for iv antibiotics and admission into hospital.  She acknowledges understanding.
Returned and admitted
no exercise/quiet play/no sports/gym

## 2022-09-28 NOTE — PROGRESS NOTES
Problem: Falls - Risk of  Goal: *Absence of Falls  Description: Document Shilpa Amadoer Fall Risk and appropriate interventions in the flowsheet.   Outcome: Progressing Towards Goal  Note: Fall Risk Interventions:                                Problem: Patient Education: Go to Patient Education Activity  Goal: Patient/Family Education  Outcome: Progressing Towards Goal

## 2022-09-28 NOTE — PROGRESS NOTES
Hospitalist Progress Note              Mac Ramírez MD.                                                             Cell: [phone]                               NAME:  Funmilayo Nunez  :  1985  MRN:  [id number]  Date of Service:  2022    Summary: 40 y.o. female who presented on 2022 from group home with positive blood culture growing E coli 2/4 bottles       Assessment/Plan:  1. E coli bacteremia: This is 2/2 to UTI  No evidence of sepsis  Blood cx: : E coli 2/4. Sen to rocephin and cipro  Currently on zosyn  We will deescalate abx to rocephin 2 gm I.V daily  We will probably d/c home on cipro vs I.M rocephin to complete 12 days course of abx   Repeat blood cx : no growth so far    2. UTI: Urine cx: E coli  On I.v abx     3. Mental retardation, Bipolar disorder  Continue psycho tropic meds  Appears to be at baseline    4. AGUS: continue IVF  Repeat BMP in AM     Code status: Full  DVT prophylaxsis: Lovenx  Dispo:Likely back to [facility] home in 1-2 days         Interval History/Subjective:  F/up for E coli bacteremia  No acute overnight event  Poor historian. Speech mainly incomprehensible    Review of Systems:  A comprehensive review of systems was negative except for that written in the HPI. Objective:     VITALS:   Last 24hrs VS reviewed since prior progress note. Most recent are:  Visit Vitals  /70   Pulse 79   Temp 98.2 °F (36.8 °C)   Resp 18   Ht 5' 4\" (1.626 m)   Wt 50.1 kg (110 lb 7.2 oz)   SpO2 96%   BMI 18.96 kg/m²       Intake/Output Summary (Last 24 hours) at 2022 1243  Last data filed at 2022 2332  Gross per 24 hour   Intake 300 ml   Output --   Net 300 ml        PHYSICAL EXAM:  General: No acute distress, cooperative, pleasant   EENT: EOMI. Anicteric sclerae. Oral mucous moist, oropharynx benign  Resp: CTA bilaterally. No wheezing/rhonchi/rales.  No accessory muscle use  CV: Regular rhythm, normal rate, no murmurs, gallops, rubs  GI: Soft, non distended, non tender. normoactive bowel sounds, no hepatosplenomegaly Extremities: No edema, warm, 2+ pulses throughout  Neurologic: Moves all extremities. AAOx3, CN II-XII grossly intact  Psych: Good insight. Not anxious nor agitated. Skin: Good Turgor, no rashes or ulcers    Lab Data Personally Reviewed: (see below)     Medications list Personally Reviewed:  x YES  NO     _______________________________________________________________________  Care Plan discussed with:  Patient/Family and Nurse    Total NON critical care TIME:  30 minutes    Morena Booth MD     Procedures: see electronic medical records for all procedures/Xrays and details which were not copied into this note but were reviewed prior to creation of Plan. LABS:  Recent Labs     09/27/22  1620 09/25/22  2210   WBC 10.7 11.2*   HGB 10.3* 11.8   HCT 31.2* 35.2    156     Recent Labs     09/27/22  1620 09/25/22  2210    137   K 3.3* 3.3*    105   CO2 25 25   BUN 30* 35*   CREA 1.45* 1.67*   GLU 90 106*   CA 8.8 9.0     Recent Labs     09/27/22  1620 09/25/22  2210   ALT 14 14   AP 54 57   TBILI 0.2 0.4   TP 6.8 7.5   ALB 2.1* 2.2*   GLOB 4.7* 5.3*     No results for input(s): INR, PTP, APTT, INREXT in the last 72 hours. No results for input(s): FE, TIBC, PSAT, FERR in the last 72 hours. No results found for: FOL, RBCF   No results for input(s): PH, PCO2, PO2 in the last 72 hours. No results for input(s): CPK, CKNDX, TROIQ in the last 72 hours.     No lab exists for component: CPKMB  Lab Results   Component Value Date/Time    Cholesterol, total 129 02/15/2017 08:43 AM    HDL Cholesterol 54 02/15/2017 08:43 AM    LDL, calculated 40 02/15/2017 08:43 AM    Triglyceride 176 (H) 02/15/2017 08:43 AM     No results found for: GLUCPOC  Lab Results   Component Value Date/Time    Color YELLOW/STRAW 09/25/2022 11:12 PM    Appearance TURBID (A) 09/25/2022 11:12 PM    Specific gravity 1.010 09/25/2022 11:12 PM    pH (UA) 5.5 09/25/2022 11:12 PM    Protein 30 (A) 09/25/2022 11:12 PM    Glucose Negative 09/25/2022 11:12 PM    Ketone Negative 09/25/2022 11:12 PM    Bilirubin Negative 09/25/2022 11:12 PM    Urobilinogen 1.0 09/25/2022 11:12 PM    Nitrites Negative 09/25/2022 11:12 PM    Leukocyte Esterase LARGE (A) 09/25/2022 11:12 PM    Epithelial cells FEW 09/25/2022 11:12 PM    Bacteria 4+ (A) 09/25/2022 11:12 PM    WBC >100 (H) 09/25/2022 11:12 PM    RBC 0-5 09/25/2022 11:12 PM

## 2022-09-28 NOTE — PROGRESS NOTES
9/28/2022  2:06 PM  2nd attempt to contact staff at pt's Group Home to determine PLOF, Left HIPPA compliant VM. ZAINAB Farooq       9:45 AM  EMR reviewed, noted pt has MR and resides in group home. CM placed TC to group home, left HIPPA compliant VM. Additionally placed TC to pt's sister Aleksandr Perez, [phone] left HIPPA compliant VM. Will attempt again.   ZIANAB Farooq

## 2022-09-28 NOTE — PROGRESS NOTES
Admission Medication Reconciliation:     Information obtained from:    Prior to admission medication list updated per review of transfer paperwork from [facility] Rx for Keflex was for 5 days; Original order started 9/26/22. RxQuery data available¹: Yes     Comments/Recommendations:   RN requested assistance with medication history; She obtained list from [facility]; Pharmacy assisted with updated PTA med list --> Updated PTA medication list     ¹RxQuery pharmacy benefit data reflects medications filled and processed through the patient's insurance, however this data does NOT capture whether the medication was picked up or is currently being taken by the patient. Facility-Administered Medications:      Prior to Admission Medications   Prescriptions Last Dose Informant Taking? Cranberry Juice Powder 425 mg cap  Transfer Papers Yes   Sig: TAKE 1 CAPSULE BY MOUTH DAILY   benztropine (COGENTIN) 1 mg tablet  Transfer Papers Yes   Sig: Take 1 Tab by mouth two (2) times a day. cephALEXin (Keflex) 500 mg capsule  Transfer Papers Yes   Sig: Take 1 Capsule by mouth four (4) times daily for 5 days. cholecalciferol, vitamin D3, 50 mcg (2,000 unit) tab  Transfer Papers Yes   Sig: Take 2,000 Units by mouth daily. cloNIDine HCL (CATAPRES) 0.1 mg tablet   Yes   Sig: Take 0.1 mg by mouth nightly. divalproex (DEPAKOTE SPRINKLE) 125 mg capsule  Transfer Papers Yes   Sig: Take 500 mg by mouth three (3) times daily. Takes at 0800, 1600, and 2000   docusate sodium (COLACE) 100 mg capsule  Transfer Papers Yes   Sig: TAKE (1) CAPSULE BY MOUTH TWICE DAILY   drospirenone-ethinyl estradioL (ANAYA) 3-0.02 mg tab  Transfer Papers Yes   Sig: TAKE ONE TABLET DAILY   hydrOXYzine HCL (ATARAX) 25 mg tablet  Transfer Papers Yes   Sig: Take 25 mg by mouth daily as needed. Take one tablet (25mg) daily at noon if needed for anxiety.    hydrOXYzine HCl (ATARAX) 25 mg tablet  Transfer Papers Yes   Sig: Take 25 mg by mouth two (2) times a day. levothyroxine (SYNTHROID) 88 mcg tablet  Transfer Papers Yes   Sig: Take 88 mcg by mouth Daily (before breakfast). risperiDONE (RISPERDAL) 2 mg tablet  Transfer Papers Yes   Sig: Take 2 mg by mouth daily. risperiDONE (RisperDAL) 3 mg tablet  Transfer Papers Yes   Sig: Take 3 mg by mouth nightly. topiramate (TOPAMAX) 200 mg tablet  Transfer Papers Yes   Sig: Take 200 mg by mouth nightly. traZODone (DESYREL) 100 mg tablet  Transfer Papers Yes   Sig: Take 100 mg by mouth nightly. Facility-Administered Medications: None        Please contact the main inpatient pharmacy with any questions or concerns at [phone] and we will direct you to the clinical pharmacist covering this patient's care while in-house. Melodie Dennis Pharm. D.

## 2022-09-28 NOTE — PROGRESS NOTES
Bedside shift change report given to Shanti YIN (oncoming nurse) by Mukesh Le LPN (offgoing nurse). Report included the following information SBAR, Kardex, Intake/Output, MAR, Recent Results, and Med Rec Status.

## 2022-09-28 NOTE — PROGRESS NOTES
RN unable to get complete physical assessment this morning. Pt refuses to be touch. Pt refused/unable to answer questions. Pt occasionally responds to commands. Pt has garbled, incoherent speech. Occasionally clear. Pt refused medications. States no and/or spits up medications. RN  able to get cogentin & depakote doses in milk. Pt drank 160z milk. RN able to get pt to take 1 tablet risperdone (1mg)  in eggs. Pt ate all eggs, coffee, orange juice and a couple bites of potatoes for breakfast.        1100: MD Zeng at bedside. RN reviewed above. No new orders at this time. MD waiting repeat blood culture results. 36 MD [address] paged. Pt screaming in bed. Unable to console at times. Appears anxious. Pharmacy received assisted living facility medication update list. MD notified and asked to review. 1215pm: Haider Padmini called for update on pt. Aunt name not in contact list per HIPPA. Told pt Aunt called and wished her to feel better. 1530: Mirna Nieto from [facility] came to visit patient and bring inge. She will be avail to assist with questions for night RN at [phone].     1800: Father of pt called. Name not on summary contact page/HIPAA. Number: [phone].     6518: left  for diversity contact and  for sister contact if okay to followup with information for deanne.        BOO YIN

## 2022-09-29 LAB
ANION GAP SERPL CALC-SCNC: 4 MMOL/L (ref 5–15)
BUN SERPL-MCNC: 13 MG/DL (ref 6–20)
BUN/CREAT SERPL: 14 (ref 12–20)
CALCIUM SERPL-MCNC: 8.6 MG/DL (ref 8.5–10.1)
CHLORIDE SERPL-SCNC: 110 MMOL/L (ref 97–108)
CO2 SERPL-SCNC: 27 MMOL/L (ref 21–32)
CREAT SERPL-MCNC: 0.94 MG/DL (ref 0.55–1.02)
GLUCOSE SERPL-MCNC: 99 MG/DL (ref 65–100)
POTASSIUM SERPL-SCNC: 3.7 MMOL/L (ref 3.5–5.1)
SODIUM SERPL-SCNC: 141 MMOL/L (ref 136–145)

## 2022-09-29 PROCEDURE — 74011250637 HC RX REV CODE- 250/637: Performed by: HOSPITALIST

## 2022-09-29 PROCEDURE — 36415 COLL VENOUS BLD VENIPUNCTURE: CPT

## 2022-09-29 PROCEDURE — 65270000029 HC RM PRIVATE

## 2022-09-29 PROCEDURE — 80048 BASIC METABOLIC PNL TOTAL CA: CPT

## 2022-09-29 PROCEDURE — 74011000250 HC RX REV CODE- 250: Performed by: HOSPITALIST

## 2022-09-29 RX ADMIN — DIVALPROEX SODIUM 500 MG: 125 CAPSULE, COATED PELLETS ORAL at 21:06

## 2022-09-29 RX ADMIN — BENZTROPINE MESYLATE 1 MG: 1 TABLET ORAL at 18:49

## 2022-09-29 RX ADMIN — RISPERIDONE 2 MG: 1 TABLET ORAL at 09:47

## 2022-09-29 RX ADMIN — RISPERIDONE 3 MG: 1 TABLET ORAL at 21:06

## 2022-09-29 RX ADMIN — LEVOTHYROXINE SODIUM 88 MCG: 0.09 TABLET ORAL at 06:27

## 2022-09-29 RX ADMIN — TRAZODONE HYDROCHLORIDE 100 MG: 100 TABLET ORAL at 21:06

## 2022-09-29 RX ADMIN — SODIUM CHLORIDE, PRESERVATIVE FREE 10 ML: 5 INJECTION INTRAVENOUS at 06:29

## 2022-09-29 RX ADMIN — DIVALPROEX SODIUM 500 MG: 125 CAPSULE, COATED PELLETS ORAL at 09:46

## 2022-09-29 RX ADMIN — TOPIRAMATE 200 MG: 100 TABLET, FILM COATED ORAL at 21:06

## 2022-09-29 RX ADMIN — HYDROXYZINE HYDROCHLORIDE 25 MG: 25 TABLET, FILM COATED ORAL at 18:49

## 2022-09-29 RX ADMIN — POLYETHYLENE GLYCOL 3350 17 G: 17 POWDER, FOR SOLUTION ORAL at 09:00

## 2022-09-29 RX ADMIN — HYDROXYZINE HYDROCHLORIDE 25 MG: 25 TABLET, FILM COATED ORAL at 09:46

## 2022-09-29 RX ADMIN — BENZTROPINE MESYLATE 1 MG: 1 TABLET ORAL at 09:51

## 2022-09-29 RX ADMIN — DIVALPROEX SODIUM 500 MG: 125 CAPSULE, COATED PELLETS ORAL at 19:00

## 2022-09-29 NOTE — PROGRESS NOTES
Reason for Admission:  E coli bacteremia                   RUR Score:          13% green/low           Plan for utilizing home health:      TBD    PCP: First and Last name:  Dr Hendrickson Postin   Name of Practice:    Are you a current patient: Yes/No:  Yes   Approximate date of last visit:  within the last 6 months   Can you participate in a virtual visit with your PCP:                     Current Advanced Directive/Advance Care Plan: Full Code    Healthcare Decision Maker:   Click here to complete [address] including selection of the Healthcare Decision Maker Relationship (ie \"Primary\")           Patient's sister Althea Almeida is her NOK                  Transition of Care Plan:                    Patient lives at [place] [address] at the address listed on demographics. I spoke to her caregiver Eli ([place]) for the initial assessment. Patient is ambulatory at baseline without any assistive device and does not use any other DME. The group home does not have any preference for home health companies if needed and patient does not have a history. Patient's next of kin is her sister Althea Almeida who can be reached at [phone]. Her caregiver Eli ([place]) is the primary contact for discharge and can be reached at [phone]. Patient will be able to return to the group home and they will provide transportation at discharge, we just need to let Eli ([place]) know of discharge as early as possible. Please note patient's medications will need to be sent to [address] [place] [address]. Based on previous experience this pharmacy also likes a discharge summary faxed for the patient as they are a long term compounding pharmacy and it is helpful for them. Fax number is [phone]. No noted needs at this time for discharge, will continue to follow and assist with discharge planning.      Transition of Care Plan   Continue medical management/treatment  Return to group home at discharge  Medications to be sent to [facility] Group home will transport at dc  Follow up outpatient as indicated  CM will continue to follow    Care Management Interventions  PCP Verified by CM:  Yes (Dr Crista Anaya)  Transition of Care Consult (CM Consult): Discharge Planning  Support Systems: Adult Group Home, Caregiver/Home Care Staff  Confirm Follow Up Transport: Other (see comment)  Discharge Location  Patient Expects to be Discharged to[de-identified] Group home    ANTHONY Angel

## 2022-09-29 NOTE — PROGRESS NOTES
Bedside shift change report given to Shanti YIN (oncoming nurse) by Morris Torres LPN (offgoing nurse). Report included the following information SBAR, Kardex, Intake/Output, MAR, and Med Rec Status.

## 2022-09-29 NOTE — PROGRESS NOTES
9/29/2022  Case Management Note    9:35 AM  Tried again to call patient's caregiver for initial assessment, no answer. Left voicemail.      ANTHONY Briones

## 2022-09-29 NOTE — PROGRESS NOTES
Spiritual Care Assessment/Progress Note  [address]      NAME: Barbara Ivan      MRN: [id number]  AGE: 40 y.o.  SEX: female  Zoroastrian Affiliation: No Yarsanism   Language: English     9/29/2022     Total Time (in minutes): 8     Spiritual Assessment begun in [facility] 5M1 MED SURG 1 through conversation with:         [x]Patient        [] Family    [] Friend(s)        Reason for Consult: Initial/Spiritual assessment, patient floor     Spiritual beliefs: (Please include comment if needed)     [] Identifies with a amparo tradition:         [] Supported by a amparo community:            [] Claims no spiritual orientation:           [] Seeking spiritual identity:                [] Adheres to an individual form of spirituality:           [x] Not able to assess:                           Identified resources for coping:      [] Prayer                               [] Music                  [] Guided Imagery     [] Family/friends                 [] Pet visits     [] Devotional reading                         [x] Unknown     [] Other:                                               Interventions offered during this visit: (See comments for more details)    Patient Interventions: Initial/Spiritual assessment, patient floor, Affirmation of emotions/emotional suffering           Plan of Care:     [] Support spiritual and/or cultural needs    [] Support AMD and/or advance care planning process      [] Support grieving process   [] Coordinate Rites and/or Rituals    [] Coordination with community clergy   [x] No spiritual needs identified at this time   [] Detailed Plan of Care below (See Comments)  [] Make referral to Music Therapy  [] Make referral to Pet Therapy     [] Make referral to Addiction services  [] Make referral to [facility]  [] Make referral to Spiritual Care Partner  [] No future visits requested        [] Contact Spiritual Care for further referrals     Comments: I provided a presence of support and care to Ms. Louise Singh during this initial spiritual assessment. A spiritual care volunteer was shadowing. Chaplains remain available if any spiritual care needs arise. Rev. Wilfredo Dubois M.Div, [facility] Specialist

## 2022-09-29 NOTE — PROGRESS NOTES
Problem: Falls - Risk of  Goal: *Absence of Falls  Description: Document Kaya Tellez Fall Risk and appropriate interventions in the flowsheet. Outcome: Progressing Towards Goal  Note: Fall Risk Interventions:  Mobility Interventions: Assess mobility with egress test         Medication Interventions: Evaluate medications/consider consulting pharmacy    Elimination Interventions: Bed/chair exit alarm              Problem: Patient Education: Go to Patient Education Activity  Goal: Patient/Family Education  Outcome: Progressing Towards Goal     Problem: Pressure Injury - Risk of  Goal: *Prevention of pressure injury  Description: Document Juan Scale and appropriate interventions in the flowsheet.   Outcome: Progressing Towards Goal  Note: Pressure Injury Interventions:  Sensory Interventions: Assess changes in LOC    Moisture Interventions: Absorbent underpads    Activity Interventions: Increase time out of bed    Mobility Interventions: Float heels    Nutrition Interventions: Offer support with meals,snacks and hydration    Friction and Shear Interventions: Feet elevated on foot rest                Problem: Patient Education: Go to Patient Education Activity  Goal: Patient/Family Education  Outcome: Progressing Towards Goal

## 2022-09-29 NOTE — PROGRESS NOTES
Hospitalist Progress Note              Maria Luisa Hyman MD.                                                             Cell: [phone]                               NAME:  Rick Chaudhary  :  1985  MRN:  [id number]  Date of Service:  2022    Summary: 40 y.o. female who presented on 2022 from group home with positive blood culture growing E coli 2/4 bottles       Assessment/Plan:  1. E coli bacteremia: This is 2/2 to UTI  No evidence of sepsis  Blood cx: : E coli 2/4. Sen to rocephin and cipro  Currently on zosyn which was discontinued  Continue Rocephin 2 gm I.V daily  Switch to oral ciprofloxacin 500 mg Po BID for 12 days on discharge  Repeat blood cx : no growth so far    2. UTI: Urine cx: E coli  On I.v abx as above     3. Mental retardation, Bipolar disorder  Continue psycho tropic meds  Appears to be at baseline    4. AGUS: continue IVF  Repeat BMP      Code status: Full  DVT prophylaxsis: Lovenx  Dispo: D/c to [facility] home tomorrow         Interval History/Subjective:  F/up for E coli bacteremia  No acute overnight event  Poor historian. Speech mainly incomprehensible    Review of Systems:  A comprehensive review of systems was negative except for that written in the HPI. Objective:     VITALS:   Last 24hrs VS reviewed since prior progress note. Most recent are:  Visit Vitals  /69 (BP 1 Location: Right leg, BP Patient Position: Lying; At rest)   Pulse (!) 102   Temp 98.8 °F (37.1 °C)   Resp 16   Ht 5' 4\" (1.626 m)   Wt 50.1 kg (110 lb 7.2 oz)   SpO2 97%   BMI 18.96 kg/m²     No intake or output data in the 24 hours ending 22 1112       PHYSICAL EXAM:  General: No acute distress, cooperative, pleasant   EENT: EOMI. Anicteric sclerae. Oral mucous moist, oropharynx benign  Resp: CTA bilaterally. No wheezing/rhonchi/rales.  No accessory muscle use  CV: Regular rhythm, normal rate, no murmurs, gallops, rubs  GI: Soft, non distended, non tender. normoactive bowel sounds, no hepatosplenomegaly Extremities: No edema, warm, 2+ pulses throughout  Neurologic: Moves all extremities. AAOx3, CN II-XII grossly intact  Psych: Good insight. Not anxious nor agitated. Skin: Good Turgor, no rashes or ulcers    Lab Data Personally Reviewed: (see below)     Medications list Personally Reviewed:  x YES  NO     _______________________________________________________________________  Care Plan discussed with:  Patient/Family and Nurse    Total NON critical care TIME:  30 minutes    Zachary Ghotra MD     Procedures: see electronic medical records for all procedures/Xrays and details which were not copied into this note but were reviewed prior to creation of Plan. LABS:  Recent Labs     09/27/22  1620   WBC 10.7   HGB 10.3*   HCT 31.2*          Recent Labs     09/27/22  1620      K 3.3*      CO2 25   BUN 30*   CREA 1.45*   GLU 90   CA 8.8       Recent Labs     09/27/22  1620   ALT 14   AP 54   TBILI 0.2   TP 6.8   ALB 2.1*   GLOB 4.7*       No results for input(s): INR, PTP, APTT, INREXT, INREXT in the last 72 hours. No results for input(s): FE, TIBC, PSAT, FERR in the last 72 hours. No results found for: FOL, RBCF   No results for input(s): PH, PCO2, PO2 in the last 72 hours. No results for input(s): CPK, CKNDX, TROIQ in the last 72 hours.     No lab exists for component: CPKMB  Lab Results   Component Value Date/Time    Cholesterol, total 129 02/15/2017 08:43 AM    HDL Cholesterol 54 02/15/2017 08:43 AM    LDL, calculated 40 02/15/2017 08:43 AM    Triglyceride 176 (H) 02/15/2017 08:43 AM     No results found for: [facility]  Lab Results   Component Value Date/Time    Color YELLOW/STRAW 09/25/2022 11:12 PM    Appearance TURBID (A) 09/25/2022 11:12 PM    Specific gravity 1.010 09/25/2022 11:12 PM    pH (UA) 5.5 09/25/2022 11:12 PM    Protein 30 (A) 09/25/2022 11:12 PM    Glucose Negative 09/25/2022 11:12 PM    Ketone Negative 09/25/2022 11:12 PM    Bilirubin Negative 09/25/2022 11:12 PM    Urobilinogen 1.0 09/25/2022 11:12 PM    Nitrites Negative 09/25/2022 11:12 PM    Leukocyte Esterase LARGE (A) 09/25/2022 11:12 PM    Epithelial cells FEW 09/25/2022 11:12 PM    Bacteria 4+ (A) 09/25/2022 11:12 PM    WBC >100 (H) 09/25/2022 11:12 PM    RBC 0-5 09/25/2022 11:12 PM

## 2022-09-29 NOTE — PROGRESS NOTES
Bedside shift change report given to Elie (oncoming nurse) by María Campbell (offgoing nurse). Report included the following information SBAR.

## 2022-09-30 VITALS
HEIGHT: 64 IN | OXYGEN SATURATION: 97 % | DIASTOLIC BLOOD PRESSURE: 76 MMHG | TEMPERATURE: 97.7 F | BODY MASS INDEX: 17.42 KG/M2 | RESPIRATION RATE: 16 BRPM | WEIGHT: 102 LBS | HEART RATE: 88 BPM | SYSTOLIC BLOOD PRESSURE: 111 MMHG

## 2022-09-30 PROCEDURE — 74011250637 HC RX REV CODE- 250/637: Performed by: HOSPITALIST

## 2022-09-30 RX ORDER — CEFDINIR 300 MG/1
300 CAPSULE ORAL 2 TIMES DAILY
Qty: 20 CAPSULE | Refills: 0 | Status: SHIPPED | OUTPATIENT
Start: 2022-09-30 | End: 2022-10-10

## 2022-09-30 RX ADMIN — BENZTROPINE MESYLATE 1 MG: 1 TABLET ORAL at 09:28

## 2022-09-30 RX ADMIN — LEVOTHYROXINE SODIUM 88 MCG: 0.09 TABLET ORAL at 09:29

## 2022-09-30 RX ADMIN — HYDROXYZINE HYDROCHLORIDE 25 MG: 25 TABLET, FILM COATED ORAL at 09:28

## 2022-09-30 RX ADMIN — DIVALPROEX SODIUM 500 MG: 125 CAPSULE, COATED PELLETS ORAL at 09:28

## 2022-09-30 RX ADMIN — RISPERIDONE 2 MG: 1 TABLET ORAL at 09:28

## 2022-09-30 RX ADMIN — POLYETHYLENE GLYCOL 3350 17 G: 17 POWDER, FOR SOLUTION ORAL at 09:28

## 2022-09-30 NOTE — PROGRESS NOTES
Bedside shift change report given to [facility] (oncoming nurse) by Marga Jo (offgoing nurse). Report included the following information SBAR.

## 2022-09-30 NOTE — PROGRESS NOTES
9/30/2022  Case Management Progress Note    10:08 AM  Patient is 40year old female admitted 9/27 with e coli bacteremia  Patient's RUR is 12% green/low risk for readmission  Covid test: none this admission  Chart reviewed  Per chart plan is for patient to discharge today, no order noted at this time though. I have spoken to patient's caregiver Eli ([place]) and let her know that by around noon everything should be ready for patient to discharge. They will be transporting patient home. Will continue to follow and assist with discharge planning as needed.      Transition of Care Plan   Continue medical management/treatment  Return to group home likely today   Group home will provide transportation for patient  Discharge summary will need to be sent to [facility]   BERTHA will continue to follow    ANTHONY Delgado

## 2022-09-30 NOTE — DISCHARGE INSTRUCTIONS
Discharge Instructions       PATIENT ID: Lizz Bergeron  MRN: [id number]   YOB: 1985    DATE OF ADMISSION: [unfilled]    DATE OF DISCHARGE: 9/30/2022    PRIMARY CARE PROVIDER: @PCP@       DISCHARGING PHYSICIAN: Don Roach MD    To contact this individual call [phone] and ask the  to page. If unavailable ask to be transferred the Adult Hospitalist Department. DISCHARGE DIAGNOSES E coli bacteremia    CONSULTATIONS: [unfilled]    PROCEDURES/SURGERIES: * No surgery found *    PENDING TEST RESULTS:   At the time of discharge the following test results are still pending:     [facility]   [facility]     ADDITIONAL CARE RECOMMENDATIONS:     DIET: Regular Diet    ACTIVITY: Activity as tolerated    WOUND CARE:     EQUIPMENT needed:       DISCHARGE MEDICATIONS:   See Medication Reconciliation Form    It is important that you take the medication exactly as they are prescribed. Keep your medication in the bottles provided by the pharmacist and keep a list of the medication names, dosages, and times to be taken in your wallet. Do not take other medications without consulting your doctor. NOTIFY YOUR PHYSICIAN FOR ANY OF THE FOLLOWING:   Fever over 101 degrees for 24 hours. Chest pain, shortness of breath, fever, chills, nausea, vomiting, diarrhea, change in mentation, falling, weakness, bleeding. Severe pain or pain not relieved by medications. Or, any other signs or symptoms that you may have questions about. DISPOSITION:   x Home With:   OT  PT  HH  RN       SNF/Inpatient Rehab/LTAC    Independent/assisted living    Hospice    Other: group home     CDMP Checked:    Yes X       Signed:   Don Roach MD  9/30/2022  11:38 AM

## 2022-09-30 NOTE — DISCHARGE SUMMARY
Discharge Summary       PATIENT ID: Helena Roberts  MRN: [id number]   YOB: 1985    DATE OF ADMISSION: 9/27/2022  3:46 PM    DATE OF DISCHARGE: 9/30/2022  PRIMARY CARE PROVIDER: Kade Lucero MD       DISCHARGING PHYSICIAN: Leroy Garcia MD    To contact this individual call [phone] and ask the  to page. If unavailable ask to be transferred the Adult Hospitalist Department. CONSULTATIONS: None    PROCEDURES/SURGERIES: * No surgery found *    ADMITTING DIAGNOSES & HOSPITAL COURSE:     40 y.o. female who presented on 9/27/2022 from group home with positive blood culture growing E coli 2/4 bottles    1. E coli bacteremia: This is 2/2 to UTI  No evidence of sepsis  Blood cx: 9/25: E coli 2/4. Sen to rocephin and cipro  Currently on zosyn which was discontinued  Received Rocephin 2 gm I.V daily 9/28- 9/30  Cannot discharge home on cipro due to significant interactions with Psychotropic drugs causing prolonged QT  We will discharge home cefdinir 300 mg PO BID x 10days  Repeat blood cx 9/27: no growth so far     2. UTI: Urine cx: E coli  On I.v abx as above     3. Mental retardation, Bipolar disorder  Continue psycho tropic meds  Appears to be at baseline     4. AGUS: continue IVF  Resolved       DISCHARGE DIAGNOSES / PLAN:       As above       PENDING TEST RESULTS:   At the time of discharge the following test results are still pending:     FOLLOW UP APPOINTMENTS:    Follow-up Information       Follow up With Specialties Details Why Contact Info    Other, MD [facility]                ADDITIONAL CARE RECOMMENDATIONS:     DIET: Regular Diet    ACTIVITY: Activity as tolerated    WOUND CARE:     EQUIPMENT needed:       DISCHARGE MEDICATIONS:  Current Discharge Medication List        START taking these medications    Details   cefdinir (OMNICEF) 300 mg capsule Take 1 Capsule by mouth two (2) times a day for 10 days.  Indications: E coli Bacteremia  Qty: 20 Capsule, Refills: 0  Start date: 9/30/2022, End date: 10/10/2022           CONTINUE these medications which have NOT CHANGED    Details   divalproex (DEPAKOTE SPRINKLE) 125 mg capsule Take 500 mg by mouth three (3) times daily. Takes at 0800, 1600, and 2000      levothyroxine (SYNTHROID) 88 mcg tablet Take 88 mcg by mouth Daily (before breakfast). !! risperiDONE (RisperDAL) 3 mg tablet Take 3 mg by mouth nightly. cholecalciferol, vitamin D3, 50 mcg (2,000 unit) tab Take 2,000 Units by mouth daily. !! hydrOXYzine HCL (ATARAX) 25 mg tablet Take 25 mg by mouth daily as needed. Take one tablet (25mg) daily at noon if needed for anxiety. cloNIDine HCL (CATAPRES) 0.1 mg tablet Take 0.1 mg by mouth nightly. drospirenone-ethinyl estradioL (ANAYA) 3-0.02 mg tab TAKE ONE TABLET DAILY  Qty: 28 Tablet, Refills: 11      Cranberry Juice Powder 425 mg cap TAKE 1 CAPSULE BY MOUTH DAILY  Qty: 31 Cap, Refills: 12      docusate sodium (COLACE) 100 mg capsule TAKE (1) CAPSULE BY MOUTH TWICE DAILY  Qty: 62 Cap, Refills: PRN      ! ! hydrOXYzine HCl (ATARAX) 25 mg tablet Take 25 mg by mouth two (2) times a day. !! risperiDONE (RISPERDAL) 2 mg tablet Take 2 mg by mouth daily. topiramate (TOPAMAX) 200 mg tablet Take 200 mg by mouth nightly. traZODone (DESYREL) 100 mg tablet Take 100 mg by mouth nightly. benztropine (COGENTIN) 1 mg tablet Take 1 Tab by mouth two (2) times a day. Qty: 60 Tab, Refills: 3       !! - Potential duplicate medications found. Please discuss with provider. STOP taking these medications       cephALEXin (Keflex) 500 mg capsule Comments:   Reason for Stopping:                 NOTIFY YOUR PHYSICIAN FOR ANY OF THE FOLLOWING:   Fever over 101 degrees for 24 hours. Chest pain, shortness of breath, fever, chills, nausea, vomiting, diarrhea, change in mentation, falling, weakness, bleeding. Severe pain or pain not relieved by medications.   Or, any other signs or symptoms that you may have questions about.    DISPOSITION:    Home With:   OT  PT  HH  RN       Long term SNF/Inpatient Rehab    Independent/assisted living    Hospice   x Other: Group home       PATIENT CONDITION AT DISCHARGE:     Functional status    Poor     Deconditioned     Independent      Cognition     Lucid    X Psychosis    Dementia      Catheters/lines (plus indication)    Zelaya     PICC     PEG    x None      Code status    x Full code     DNR      PHYSICAL EXAMINATION AT DISCHARGE:   Refer to Progress Note    General: No acute distress, cooperative. Incomprehensible speech  EENT: EOMI. Anicteric sclerae. Oral mucous moist, oropharynx benign  Resp: CTA bilaterally. No wheezing/rhonchi/rales. No accessory muscle use  CV: Regular rhythm, normal rate, no murmurs, gallops, rubs  GI: Soft, non distended, non tender. normoactive bowel sounds, no hepatosplenomegaly Extremities: No edema, warm, 2+ pulses throughout  Neurologic: Moves all extremities. Awake. Note oriented to place or time. Psych: Poor insight.    Skin: Good Turgor, no rashes or ulcers      CHRONIC MEDICAL DIAGNOSES:  Problem List as of 9/30/2022 Date Reviewed: 12/16/2019            Codes Class Noted - Resolved    * (Principal) E coli bacteremia ICD-10-CM: R78.81, B96.20  ICD-9-CM: 790.7, 041.49  9/27/2022 - Present        Abdominal pain ICD-10-CM: R10.9  ICD-9-CM: 789.00  4/4/2018 - Present        Hypothyroid ICD-10-CM: E03.9  ICD-9-CM: 244.9  1/29/2015 - Present        Psychosis (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.) ICD-10-CM: F31.9  ICD-9-CM: 296.80  11/8/2010 - Present        MR (mental retardation), severe ICD-10-CM: F72  ICD-9-CM: 318.1  11/8/2010 - Present        RESOLVED: UTI (urinary tract infection) ICD-10-CM: N39.0  ICD-9-CM: 599.0  12/24/2017 - 2/16/2018        RESOLVED: Altered mental status ICD-10-CM: R41.82  ICD-9-CM: 780.97  12/24/2017 - 2/16/2018        RESOLVED: Acute kidney injury ([facility].) ICD-10-CM: N17.9  ICD-9-CM: 584.9  12/24/2017 - 2/16/2018           Greater than 30 minutes were spent with the patient on counseling and coordination of care    Signed:   Mikala Lr MD  9/30/2022  11:31 AM

## 2022-10-01 LAB
BACTERIA SPEC CULT: ABNORMAL
SERVICE CMNT-IMP: ABNORMAL

## 2022-10-03 LAB
BACTERIA SPEC CULT: NORMAL
BACTERIA SPEC CULT: NORMAL
SERVICE CMNT-IMP: NORMAL
SERVICE CMNT-IMP: NORMAL

## 2022-10-12 RX ORDER — DOCUSATE SODIUM 100 MG/1
CAPSULE, LIQUID FILLED ORAL
Qty: 62 CAPSULE | Refills: 12 | Status: SHIPPED | OUTPATIENT
Start: 2022-10-12

## 2023-10-10 RX ORDER — DOCUSATE SODIUM 100 MG/1
CAPSULE, LIQUID FILLED ORAL
Qty: 62 CAPSULE | Refills: 12 | Status: SHIPPED | OUTPATIENT
Start: 2023-10-10